# Patient Record
Sex: FEMALE | Race: BLACK OR AFRICAN AMERICAN | Employment: OTHER | URBAN - METROPOLITAN AREA
[De-identification: names, ages, dates, MRNs, and addresses within clinical notes are randomized per-mention and may not be internally consistent; named-entity substitution may affect disease eponyms.]

---

## 2017-01-17 ENCOUNTER — TELEPHONE (OUTPATIENT)
Dept: INTERNAL MEDICINE CLINIC | Age: 72
End: 2017-01-17

## 2017-01-19 ENCOUNTER — OFFICE VISIT (OUTPATIENT)
Dept: INTERNAL MEDICINE CLINIC | Age: 72
End: 2017-01-19

## 2017-01-19 VITALS
WEIGHT: 221 LBS | DIASTOLIC BLOOD PRESSURE: 78 MMHG | HEART RATE: 72 BPM | BODY MASS INDEX: 37.06 KG/M2 | SYSTOLIC BLOOD PRESSURE: 138 MMHG

## 2017-01-19 DIAGNOSIS — J06.9 UPPER RESPIRATORY TRACT INFECTION, UNSPECIFIED TYPE: ICD-10-CM

## 2017-01-19 DIAGNOSIS — M94.0 COSTOCHONDRITIS: Primary | ICD-10-CM

## 2017-01-19 PROCEDURE — 4040F PNEUMOC VAC/ADMIN/RCVD: CPT | Performed by: NURSE PRACTITIONER

## 2017-01-19 PROCEDURE — 3014F SCREEN MAMMO DOC REV: CPT | Performed by: NURSE PRACTITIONER

## 2017-01-19 PROCEDURE — G8484 FLU IMMUNIZE NO ADMIN: HCPCS | Performed by: NURSE PRACTITIONER

## 2017-01-19 PROCEDURE — 1123F ACP DISCUSS/DSCN MKR DOCD: CPT | Performed by: NURSE PRACTITIONER

## 2017-01-19 PROCEDURE — 1036F TOBACCO NON-USER: CPT | Performed by: NURSE PRACTITIONER

## 2017-01-19 PROCEDURE — G8399 PT W/DXA RESULTS DOCUMENT: HCPCS | Performed by: NURSE PRACTITIONER

## 2017-01-19 PROCEDURE — 99213 OFFICE O/P EST LOW 20 MIN: CPT | Performed by: NURSE PRACTITIONER

## 2017-01-19 PROCEDURE — 3017F COLORECTAL CA SCREEN DOC REV: CPT | Performed by: NURSE PRACTITIONER

## 2017-01-19 PROCEDURE — G8427 DOCREV CUR MEDS BY ELIG CLIN: HCPCS | Performed by: NURSE PRACTITIONER

## 2017-01-19 PROCEDURE — G8419 CALC BMI OUT NRM PARAM NOF/U: HCPCS | Performed by: NURSE PRACTITIONER

## 2017-01-19 PROCEDURE — 1090F PRES/ABSN URINE INCON ASSESS: CPT | Performed by: NURSE PRACTITIONER

## 2017-01-19 RX ORDER — IBUPROFEN 200 MG
600 TABLET ORAL EVERY 8 HOURS PRN
Qty: 120 TABLET | Refills: 0 | COMMUNITY
Start: 2017-01-19 | End: 2017-09-15

## 2017-01-19 RX ORDER — AZITHROMYCIN 250 MG/1
TABLET, FILM COATED ORAL
Qty: 1 PACKET | Refills: 0 | Status: SHIPPED | OUTPATIENT
Start: 2017-01-19 | End: 2017-01-29

## 2017-01-19 RX ORDER — BENZONATATE 100 MG/1
200 CAPSULE ORAL 3 TIMES DAILY PRN
Qty: 30 CAPSULE | Refills: 0 | Status: SHIPPED | OUTPATIENT
Start: 2017-01-19 | End: 2017-01-26

## 2017-02-28 ENCOUNTER — OFFICE VISIT (OUTPATIENT)
Dept: GYNECOLOGY | Age: 72
End: 2017-02-28

## 2017-02-28 VITALS
DIASTOLIC BLOOD PRESSURE: 78 MMHG | WEIGHT: 218.8 LBS | BODY MASS INDEX: 40.27 KG/M2 | TEMPERATURE: 97.3 F | HEART RATE: 68 BPM | RESPIRATION RATE: 17 BRPM | SYSTOLIC BLOOD PRESSURE: 143 MMHG | HEIGHT: 62 IN

## 2017-02-28 DIAGNOSIS — Z01.419 WELL WOMAN EXAM WITH ROUTINE GYNECOLOGICAL EXAM: Primary | ICD-10-CM

## 2017-02-28 DIAGNOSIS — N89.8 VAGINAL DRYNESS: ICD-10-CM

## 2017-02-28 DIAGNOSIS — Z78.0 MENOPAUSE: ICD-10-CM

## 2017-02-28 DIAGNOSIS — Z12.31 ENCOUNTER FOR SCREENING MAMMOGRAM FOR MALIGNANT NEOPLASM OF BREAST: ICD-10-CM

## 2017-02-28 PROCEDURE — G0101 CA SCREEN;PELVIC/BREAST EXAM: HCPCS | Performed by: OBSTETRICS & GYNECOLOGY

## 2017-02-28 ASSESSMENT — ENCOUNTER SYMPTOMS
RESPIRATORY NEGATIVE: 1
EYES NEGATIVE: 1
GASTROINTESTINAL NEGATIVE: 1

## 2017-03-03 LAB
HPV COMMENT: NORMAL
HPV TYPE 16: NOT DETECTED
HPV TYPE 18: NOT DETECTED
HPVOH (OTHER TYPES): NOT DETECTED

## 2017-05-02 ENCOUNTER — OFFICE VISIT (OUTPATIENT)
Dept: INTERNAL MEDICINE CLINIC | Age: 72
End: 2017-05-02

## 2017-05-02 VITALS
WEIGHT: 219 LBS | OXYGEN SATURATION: 98 % | SYSTOLIC BLOOD PRESSURE: 135 MMHG | DIASTOLIC BLOOD PRESSURE: 75 MMHG | BODY MASS INDEX: 40.16 KG/M2 | HEART RATE: 68 BPM

## 2017-05-02 DIAGNOSIS — E78.00 PURE HYPERCHOLESTEROLEMIA: Chronic | ICD-10-CM

## 2017-05-02 DIAGNOSIS — E89.0 POST-SURGICAL HYPOTHYROIDISM: Chronic | ICD-10-CM

## 2017-05-02 DIAGNOSIS — E66.01 SEVERE OBESITY (BMI 35.0-39.9) WITH COMORBIDITY (HCC): ICD-10-CM

## 2017-05-02 DIAGNOSIS — R03.0 BORDERLINE HYPERTENSION: ICD-10-CM

## 2017-05-02 DIAGNOSIS — E78.00 PURE HYPERCHOLESTEROLEMIA: Primary | Chronic | ICD-10-CM

## 2017-05-02 DIAGNOSIS — K21.9 GASTROESOPHAGEAL REFLUX DISEASE, ESOPHAGITIS PRESENCE NOT SPECIFIED: Chronic | ICD-10-CM

## 2017-05-02 DIAGNOSIS — G40.909 SEIZURE DISORDER (HCC): Chronic | ICD-10-CM

## 2017-05-02 DIAGNOSIS — D70.2 OTHER DRUG-INDUCED NEUTROPENIA (HCC): Chronic | ICD-10-CM

## 2017-05-02 LAB
CHOLESTEROL, TOTAL: 217 MG/DL (ref 0–199)
HDLC SERPL-MCNC: 68 MG/DL (ref 40–60)
LDL CHOLESTEROL CALCULATED: 134 MG/DL
TRIGL SERPL-MCNC: 73 MG/DL (ref 0–150)
TSH SERPL DL<=0.05 MIU/L-ACNC: 1.84 UIU/ML (ref 0.27–4.2)
VLDLC SERPL CALC-MCNC: 15 MG/DL

## 2017-05-02 PROCEDURE — 1123F ACP DISCUSS/DSCN MKR DOCD: CPT | Performed by: INTERNAL MEDICINE

## 2017-05-02 PROCEDURE — G0447 BEHAVIOR COUNSEL OBESITY 15M: HCPCS | Performed by: INTERNAL MEDICINE

## 2017-05-02 PROCEDURE — G8417 CALC BMI ABV UP PARAM F/U: HCPCS | Performed by: INTERNAL MEDICINE

## 2017-05-02 PROCEDURE — 1036F TOBACCO NON-USER: CPT | Performed by: INTERNAL MEDICINE

## 2017-05-02 PROCEDURE — 99214 OFFICE O/P EST MOD 30 MIN: CPT | Performed by: INTERNAL MEDICINE

## 2017-05-02 PROCEDURE — G8427 DOCREV CUR MEDS BY ELIG CLIN: HCPCS | Performed by: INTERNAL MEDICINE

## 2017-05-02 PROCEDURE — 1090F PRES/ABSN URINE INCON ASSESS: CPT | Performed by: INTERNAL MEDICINE

## 2017-05-02 PROCEDURE — 3014F SCREEN MAMMO DOC REV: CPT | Performed by: INTERNAL MEDICINE

## 2017-05-02 PROCEDURE — 3017F COLORECTAL CA SCREEN DOC REV: CPT | Performed by: INTERNAL MEDICINE

## 2017-05-02 PROCEDURE — G8399 PT W/DXA RESULTS DOCUMENT: HCPCS | Performed by: INTERNAL MEDICINE

## 2017-05-02 PROCEDURE — 4040F PNEUMOC VAC/ADMIN/RCVD: CPT | Performed by: INTERNAL MEDICINE

## 2017-05-02 RX ORDER — FAMOTIDINE 20 MG/1
20 TABLET, FILM COATED ORAL 2 TIMES DAILY
Qty: 180 TABLET | Refills: 3 | Status: SHIPPED | OUTPATIENT
Start: 2017-05-02 | End: 2018-05-01 | Stop reason: SDUPTHER

## 2017-05-02 RX ORDER — LEVOTHYROXINE SODIUM 88 MCG
TABLET ORAL
Qty: 90 TABLET | Refills: 3 | Status: SHIPPED | OUTPATIENT
Start: 2017-05-02 | End: 2018-04-23 | Stop reason: SDUPTHER

## 2017-05-02 RX ORDER — MOMETASONE FUROATE 50 UG/1
2 SPRAY, METERED NASAL DAILY
Qty: 3 INHALER | Refills: 3 | Status: SHIPPED | OUTPATIENT
Start: 2017-05-02 | End: 2017-11-10 | Stop reason: SDUPTHER

## 2017-05-05 RX ORDER — FLUTICASONE PROPIONATE 50 MCG
1 SPRAY, SUSPENSION (ML) NASAL DAILY
Qty: 1 BOTTLE | Refills: 3 | Status: SHIPPED | OUTPATIENT
Start: 2017-05-05 | End: 2017-09-15 | Stop reason: ALTCHOICE

## 2017-05-30 ENCOUNTER — TELEPHONE (OUTPATIENT)
Dept: INTERNAL MEDICINE CLINIC | Age: 72
End: 2017-05-30

## 2017-09-15 ENCOUNTER — HOSPITAL ENCOUNTER (OUTPATIENT)
Dept: OTHER | Age: 72
Discharge: HOME | End: 2017-09-15
Attending: NURSE PRACTITIONER | Admitting: NURSE PRACTITIONER

## 2017-09-15 ENCOUNTER — OFFICE VISIT (OUTPATIENT)
Dept: INTERNAL MEDICINE CLINIC | Age: 72
End: 2017-09-15

## 2017-09-15 VITALS
SYSTOLIC BLOOD PRESSURE: 130 MMHG | DIASTOLIC BLOOD PRESSURE: 80 MMHG | OXYGEN SATURATION: 99 % | HEART RATE: 67 BPM | WEIGHT: 222 LBS | BODY MASS INDEX: 40.71 KG/M2

## 2017-09-15 DIAGNOSIS — M25.562 ACUTE PAIN OF LEFT KNEE: ICD-10-CM

## 2017-09-15 DIAGNOSIS — M25.531 RIGHT WRIST PAIN: Primary | ICD-10-CM

## 2017-09-15 DIAGNOSIS — M25.531 RIGHT WRIST PAIN: ICD-10-CM

## 2017-09-15 DIAGNOSIS — Z23 FLU VACCINE NEED: ICD-10-CM

## 2017-09-15 PROCEDURE — 4040F PNEUMOC VAC/ADMIN/RCVD: CPT | Performed by: NURSE PRACTITIONER

## 2017-09-15 PROCEDURE — 3014F SCREEN MAMMO DOC REV: CPT | Performed by: NURSE PRACTITIONER

## 2017-09-15 PROCEDURE — 1123F ACP DISCUSS/DSCN MKR DOCD: CPT | Performed by: NURSE PRACTITIONER

## 2017-09-15 PROCEDURE — 1090F PRES/ABSN URINE INCON ASSESS: CPT | Performed by: NURSE PRACTITIONER

## 2017-09-15 PROCEDURE — G8399 PT W/DXA RESULTS DOCUMENT: HCPCS | Performed by: NURSE PRACTITIONER

## 2017-09-15 PROCEDURE — 1036F TOBACCO NON-USER: CPT | Performed by: NURSE PRACTITIONER

## 2017-09-15 PROCEDURE — G0008 ADMIN INFLUENZA VIRUS VAC: HCPCS | Performed by: NURSE PRACTITIONER

## 2017-09-15 PROCEDURE — G8427 DOCREV CUR MEDS BY ELIG CLIN: HCPCS | Performed by: NURSE PRACTITIONER

## 2017-09-15 PROCEDURE — 3017F COLORECTAL CA SCREEN DOC REV: CPT | Performed by: NURSE PRACTITIONER

## 2017-09-15 PROCEDURE — 90662 IIV NO PRSV INCREASED AG IM: CPT | Performed by: NURSE PRACTITIONER

## 2017-09-15 PROCEDURE — 99213 OFFICE O/P EST LOW 20 MIN: CPT | Performed by: NURSE PRACTITIONER

## 2017-09-15 PROCEDURE — G8417 CALC BMI ABV UP PARAM F/U: HCPCS | Performed by: NURSE PRACTITIONER

## 2017-10-03 ENCOUNTER — OFFICE VISIT (OUTPATIENT)
Dept: ORTHOPEDIC SURGERY | Age: 72
End: 2017-10-03

## 2017-10-03 VITALS
HEIGHT: 67 IN | BODY MASS INDEX: 32.96 KG/M2 | SYSTOLIC BLOOD PRESSURE: 140 MMHG | HEART RATE: 77 BPM | WEIGHT: 210 LBS | DIASTOLIC BLOOD PRESSURE: 91 MMHG

## 2017-10-03 DIAGNOSIS — M19.049 CMC ARTHRITIS: Primary | ICD-10-CM

## 2017-10-03 PROCEDURE — 99203 OFFICE O/P NEW LOW 30 MIN: CPT | Performed by: ORTHOPAEDIC SURGERY

## 2017-10-03 PROCEDURE — L3918 METACARP FX ORTHOSIS PRE OTS: HCPCS | Performed by: ORTHOPAEDIC SURGERY

## 2017-10-03 PROCEDURE — G8427 DOCREV CUR MEDS BY ELIG CLIN: HCPCS | Performed by: ORTHOPAEDIC SURGERY

## 2017-10-03 PROCEDURE — 1123F ACP DISCUSS/DSCN MKR DOCD: CPT | Performed by: ORTHOPAEDIC SURGERY

## 2017-10-03 PROCEDURE — 1036F TOBACCO NON-USER: CPT | Performed by: ORTHOPAEDIC SURGERY

## 2017-10-03 PROCEDURE — 3017F COLORECTAL CA SCREEN DOC REV: CPT | Performed by: ORTHOPAEDIC SURGERY

## 2017-10-03 PROCEDURE — 4040F PNEUMOC VAC/ADMIN/RCVD: CPT | Performed by: ORTHOPAEDIC SURGERY

## 2017-10-03 PROCEDURE — G8484 FLU IMMUNIZE NO ADMIN: HCPCS | Performed by: ORTHOPAEDIC SURGERY

## 2017-10-03 PROCEDURE — G8417 CALC BMI ABV UP PARAM F/U: HCPCS | Performed by: ORTHOPAEDIC SURGERY

## 2017-10-03 PROCEDURE — G8399 PT W/DXA RESULTS DOCUMENT: HCPCS | Performed by: ORTHOPAEDIC SURGERY

## 2017-10-03 PROCEDURE — 1090F PRES/ABSN URINE INCON ASSESS: CPT | Performed by: ORTHOPAEDIC SURGERY

## 2017-10-03 PROCEDURE — 3014F SCREEN MAMMO DOC REV: CPT | Performed by: ORTHOPAEDIC SURGERY

## 2017-10-03 NOTE — PROGRESS NOTES
right upper extremity swelling, no obvious deformity or malalignment  No skin lesions including ecchymosis, erythema or open wounds    Palpation:  Focal tenderness to palpation near thenar eminence and base of thumb CMC joint volar and dorsal aspect. No palpable crepitus, nontender throughout remainder of fingers, hand and wrist, and forearm without instability    Range of Motion:  Painless and symmetrical elbow range of motion  Painless and symmetrical wrist range of motion with total arc of motion 130°, painless pronation and supination  Near full composite fist and full extension of all fingers without increased pain  Mild pain with thumb opposition and abduction    Strength:  5 out of 5 active EPL, FPL, thumb abduction with pain at base of thumb with resisted motion  5 out of 5 FDS, FTP, EDC, interossei  5 out of 5 AIN/PIN    Special Tests:  Gross sensation intact to light touch median, ulnar, radial nerves without deficit  Pain with circumduction and axial grind test positive right thumb CMC joint    Skin: There are no additional worrisome rashes, ulcerations or lesions. Gait: normal nonantalgic gait    Circulation: well perfused, capillary refill brisk in all fingers    Additional Comments:     Additional Examinations:  Left Upper Extremity: Examination of the left upper extremity does not show any tenderness, deformity or injury. Range of motion is unremarkable. There is no gross instability. There are no rashes, ulcerations or lesions.   Strength and tone are normal.       Radiology:     X-rays reviewed in office from 9/15/2017:    Images personally reviewed by me, demonstrating degenerative changes at the thumb [facility] joint, mild diffuse osteopenia without acute fracture, dislocation or other osseous acute abnormality    EXAMINATION:   3 VIEWS OF THE RIGHT WRIST       9/15/2017 4:39 pm       COMPARISON:   None.       HISTORY:   ORDERING PHYSICIAN PROVIDED HISTORY: Right wrist pain   TECHNOLOGIST PROVIDED

## 2017-10-24 ENCOUNTER — OFFICE VISIT (OUTPATIENT)
Dept: ORTHOPEDIC SURGERY | Age: 72
End: 2017-10-24

## 2017-10-24 VITALS — HEIGHT: 67 IN | BODY MASS INDEX: 32.98 KG/M2 | WEIGHT: 210.1 LBS

## 2017-10-24 DIAGNOSIS — M19.049 CMC ARTHRITIS: Primary | ICD-10-CM

## 2017-10-24 PROCEDURE — G8417 CALC BMI ABV UP PARAM F/U: HCPCS | Performed by: ORTHOPAEDIC SURGERY

## 2017-10-24 PROCEDURE — 1036F TOBACCO NON-USER: CPT | Performed by: ORTHOPAEDIC SURGERY

## 2017-10-24 PROCEDURE — 1123F ACP DISCUSS/DSCN MKR DOCD: CPT | Performed by: ORTHOPAEDIC SURGERY

## 2017-10-24 PROCEDURE — G8427 DOCREV CUR MEDS BY ELIG CLIN: HCPCS | Performed by: ORTHOPAEDIC SURGERY

## 2017-10-24 PROCEDURE — 3017F COLORECTAL CA SCREEN DOC REV: CPT | Performed by: ORTHOPAEDIC SURGERY

## 2017-10-24 PROCEDURE — 4040F PNEUMOC VAC/ADMIN/RCVD: CPT | Performed by: ORTHOPAEDIC SURGERY

## 2017-10-24 PROCEDURE — 1090F PRES/ABSN URINE INCON ASSESS: CPT | Performed by: ORTHOPAEDIC SURGERY

## 2017-10-24 PROCEDURE — 3014F SCREEN MAMMO DOC REV: CPT | Performed by: ORTHOPAEDIC SURGERY

## 2017-10-24 PROCEDURE — 99212 OFFICE O/P EST SF 10 MIN: CPT | Performed by: ORTHOPAEDIC SURGERY

## 2017-10-24 PROCEDURE — G8399 PT W/DXA RESULTS DOCUMENT: HCPCS | Performed by: ORTHOPAEDIC SURGERY

## 2017-10-24 PROCEDURE — G8484 FLU IMMUNIZE NO ADMIN: HCPCS | Performed by: ORTHOPAEDIC SURGERY

## 2017-10-26 NOTE — PROGRESS NOTES
Assessment: 57-year-old female with history of right hand and thumb pain after fall 9/8/2017  1. Right hand contusion  2. 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 She appears to have recovered from her acute hand contusion and recent exacerbation of pre-existing CMC arthritis. I do not feel that images today are necessary as the patient has had excellent improvement in symptoms with conservative treatment consisting of rest and activity modification as well as a CMC wrap. She will continue to use the wrap as needed for comfort, continue with activity modification instructions of pinching  as discussed. She also understands that if she begins to have any worsening symptoms other consideration for conservative treatment may include injection, therapy as well as the possibility of operative intervention for right thumb CMC arthritis. At this point, I think that follow-up on an as-needed basis is appropriate and she is in agreement, understanding to call with any changes, questions or concerns    No Follow-up on file. History of Present Illness  Geneva Chavez is a 74 y.o. female , right-hand-dominant, retired, presenting with history of right hand and wrist pain. The patient sustained a fall on 9/8/2017 after losing her balance catching herself with her right hand and wrist. She subsequently  present to her primary care physician complaining of radiating pain from her right thumb into her right forearm, images of her forearm and wrist were obtained at the time on 9/15/2017 demonstrating no evidence of fracture, dislocation or acute osseous abnormality. Examination at her last visit consistent with a likely exacerbation of right CMC arthritis. She was provided with the [facility] wrap and pinching  modification with instructions for resting her right hand and wrist.  She returns today now over 6 weeks after her fall.  She reports that her right hand and wrist have been doing much better. She does wear the wrap which provides comfort for her. Her swelling and pain have improved and she denies any other new injuries or new symptoms today    Review of Systems  Pertinent items are noted in HPI  Review of systems reviewed from Patient History Form dated on 10/3/17 and available in the patient's chart under the Media tab. Vital Signs  There were no vitals filed for this visit. Physical Exam  Constitutional:  Patient is well-nourished and demonstrates normal hygiene. Mental Status:  Patient is alert and oriented to person, place and time. Skin:  Intact, no rashes or lesions. Right Hand/right upper extremity Examination  Inspection: No right hand or right upper extremity swelling,     no obvious deformity or malalignment  No skin lesions including ecchymosis, erythema or open wounds     Palpation: Minimal tenderness to palpation near thenar eminence and base of thumb CMC joint volar and dorsal aspect.   No palpable crepitus, nontender throughout remainder of fingers, hand and wrist, and forearm without instability     Range of Motion:  Painless and symmetrical elbow range of motion  Painless and symmetrical wrist range of motion with total arc of motion 130°, painless pronation and supination  Near full composite fist and full extension of all fingers without increased pain  Mild pain with thumb opposition and abduction     Strength:  5 out of 5 active EPL, FPL, thumb abduction with pain at base of thumb with resisted motion  5 out of 5 FDS, FTP, EDC, interossei  5 out of 5 AIN/PIN     Special Tests:  Gross sensation intact to light touch median, ulnar, radial nerves without deficit  Mild pain with circumduction and axial grind test positive right thumb CMC joint    Capillary refill brisk all fingers, symmetric  Gross sensation intact to light touch median/ulnar/radial nerves  Sensation intact to radial/ulnar aspect of fingertip        Radiology:    X-rays obtained and reviewed in office:  Additional images obtained today    Additional Diagnostic Test Findings:    Office Procedures:  No orders of the defined types were placed in this encounter. Myles Ross MD  Orthopaedic Surgeon, [address]    Contact Information:  Whitney Rausch: [phone]  Clinical )    This dictation was performed with a verbal recognition program [facility]) and it was checked for errors. It is possible that there are still dictated errors within this office note. If so, please bring any errors to my attention for an addendum. All efforts were made to ensure that this office note is accurate.

## 2017-11-02 ENCOUNTER — OFFICE VISIT (OUTPATIENT)
Dept: INTERNAL MEDICINE CLINIC | Age: 72
End: 2017-11-02

## 2017-11-02 VITALS
SYSTOLIC BLOOD PRESSURE: 136 MMHG | HEIGHT: 65 IN | HEART RATE: 71 BPM | WEIGHT: 220 LBS | DIASTOLIC BLOOD PRESSURE: 84 MMHG | BODY MASS INDEX: 36.65 KG/M2 | OXYGEN SATURATION: 94 %

## 2017-11-02 DIAGNOSIS — E78.00 PURE HYPERCHOLESTEROLEMIA: Primary | Chronic | ICD-10-CM

## 2017-11-02 DIAGNOSIS — R03.0 BORDERLINE HYPERTENSION: ICD-10-CM

## 2017-11-02 DIAGNOSIS — E89.0 POST-SURGICAL HYPOTHYROIDISM: Chronic | ICD-10-CM

## 2017-11-02 DIAGNOSIS — K21.9 GASTROESOPHAGEAL REFLUX DISEASE, ESOPHAGITIS PRESENCE NOT SPECIFIED: Chronic | ICD-10-CM

## 2017-11-02 DIAGNOSIS — E78.00 PURE HYPERCHOLESTEROLEMIA: Chronic | ICD-10-CM

## 2017-11-02 DIAGNOSIS — E66.01 SEVERE OBESITY (BMI 35.0-39.9) WITH COMORBIDITY (HCC): ICD-10-CM

## 2017-11-02 PROCEDURE — G8417 CALC BMI ABV UP PARAM F/U: HCPCS | Performed by: INTERNAL MEDICINE

## 2017-11-02 PROCEDURE — G8399 PT W/DXA RESULTS DOCUMENT: HCPCS | Performed by: INTERNAL MEDICINE

## 2017-11-02 PROCEDURE — G0447 BEHAVIOR COUNSEL OBESITY 15M: HCPCS | Performed by: INTERNAL MEDICINE

## 2017-11-02 PROCEDURE — 99214 OFFICE O/P EST MOD 30 MIN: CPT | Performed by: INTERNAL MEDICINE

## 2017-11-02 PROCEDURE — 4040F PNEUMOC VAC/ADMIN/RCVD: CPT | Performed by: INTERNAL MEDICINE

## 2017-11-02 PROCEDURE — 3017F COLORECTAL CA SCREEN DOC REV: CPT | Performed by: INTERNAL MEDICINE

## 2017-11-02 PROCEDURE — 1036F TOBACCO NON-USER: CPT | Performed by: INTERNAL MEDICINE

## 2017-11-02 PROCEDURE — 1123F ACP DISCUSS/DSCN MKR DOCD: CPT | Performed by: INTERNAL MEDICINE

## 2017-11-02 PROCEDURE — 1090F PRES/ABSN URINE INCON ASSESS: CPT | Performed by: INTERNAL MEDICINE

## 2017-11-02 PROCEDURE — G8484 FLU IMMUNIZE NO ADMIN: HCPCS | Performed by: INTERNAL MEDICINE

## 2017-11-02 PROCEDURE — 3014F SCREEN MAMMO DOC REV: CPT | Performed by: INTERNAL MEDICINE

## 2017-11-02 PROCEDURE — G8427 DOCREV CUR MEDS BY ELIG CLIN: HCPCS | Performed by: INTERNAL MEDICINE

## 2017-11-02 ASSESSMENT — PATIENT HEALTH QUESTIONNAIRE - PHQ9
2. FEELING DOWN, DEPRESSED OR HOPELESS: 0
SUM OF ALL RESPONSES TO PHQ QUESTIONS 1-9: 0
SUM OF ALL RESPONSES TO PHQ9 QUESTIONS 1 & 2: 0
1. LITTLE INTEREST OR PLEASURE IN DOING THINGS: 0

## 2017-11-02 NOTE — PROGRESS NOTES
1945    Date of Visit:  11/2/2017    Prior to Visit Medications    Medication Sig Taking? Authorizing Provider   Aspirin-Acetaminophen-Caffeine (EXCEDRIN PO) Take by mouth as needed Yes Historical Provider, MD   famotidine (PEPCID) 20 MG tablet Take 1 tablet by mouth 2 times daily Yes Carl Beltran MD   SYNTHROID 88 MCG tablet TAKE 1 TABLET DAILY Yes Carl Beltran MD   triamterene-hydrochlorothiazide (DYAZIDE) 37.5-25 MG per capsule Take 1 capsule by mouth daily as needed (edema) Yes Carl Beltran MD   dexlansoprazole (DEXILANT) 60 MG CPDR capsule Take 1 capsule by mouth nightly take one daily Yes Carl Beltran MD   Cyanocobalamin (VITAMIN B 12 PO) Take 500 mcg by mouth daily. Yes Historical Provider, MD   levetiracetam (KEPPRA) 500 MG tablet Take 500 mg by mouth 2 times daily. Yes Historical Provider, MD   Ascorbic Acid (VITAMIN C PO) Take 500 mg by mouth. Yes Historical Provider, MD   aspirin 81 MG EC tablet Take 81 mg by mouth daily. Yes Historical Provider, MD   Calcium Citrate-Vitamin D (CALCIUM CITRATE + D PO) Take 1 tablet by mouth 2 times daily (with meals). Yes Historical Provider, MD   mometasone (NASONEX) 50 MCG/ACT nasal spray 2 sprays by Nasal route daily  Carl Beltran MD        Vitals:    11/02/17 0939   BP: 136/84   Pulse: 71   SpO2: 94%   Weight: 220 lb (99.8 kg)   Height: 5' 5.25\" (1.657 m)     Body mass index is 36.33 kg/m². Wt Readings from Last 3 Encounters:   11/02/17 220 lb (99.8 kg)   10/24/17 210 lb 1.6 oz (95.3 kg)   10/03/17 210 lb (95.3 kg)     BP Readings from Last 3 Encounters:   11/02/17 136/84   10/03/17 (!) 140/91   09/15/17 130/80      CC:  Patient presents for follow-up of   1. Pure hypercholesterolemia    2. Severe obesity (BMI 35.0-39. 9) with comorbidity ([address]    3. Borderline hypertension    4. Post-surgical hypothyroidism    5.  Gastroesophageal reflux disease, esophagitis presence not specified         HPI  Hyperlipidemia/obesity:  Patient is following a low fat, low cholesterol diet- eating less carbs, smaller portions. She is not exercising regularly due to recent fall. OTC Supplements: none. Has gained about 10 pounds since accident in September- musculoskeletal symptoms now back to baseline, but having a lot of dental issues, requiring multiple root canals. Lab Results   Component Value Date    CHOL 217 (H) 05/02/2017    TRIG 73 05/02/2017    HDL 68 (H) 05/02/2017    LDLCALC 134 (H) 05/02/2017     Lab Results   Component Value Date    ALT 18 11/01/2016    AST 29 11/01/2016        Borderline hypertension:  130-140/low 80s. No CP, SOB, visual changes, dizziness, palpitations, or new or worsening HA. Trying to avoid dietary sodium. No stimulants. Taking diuretic about 1-2x/week for LE edema, which is well-controlled. Hypothyroidism: Recent symptoms: fatigue, difficulty losing weight. She denies palpitations, dry skin, weight loss, hair loss, constipation, diarrhea. Patient is  taking her medication consistently on an empty stomach. No results found for: [facility]  Lab Results   Component Value Date    TSH 1.84 05/02/2017    TSH 0.88 11/01/2016    TSH 1.08 06/14/2016     GERD:  Currently treated with OTC H2 blocker: Pepcid 20 mg bid. Only taking Dexilant occasionally. Patient denies dysphagia, cough, hoarseness, chest pain, unintentional weight loss, N/V, bloating, early satiety or change in bowel habits. Following reflux precautions and not eating after dinner. Review of Systems  As documented in HPI      Physical Exam   Constitutional: She is oriented to person, place, and time. She appears well-developed and well-nourished. No distress. HENT:   Mouth/Throat: Mucous membranes are normal.   Eyes: Conjunctivae are normal.   Neck: Neck supple. Carotid bruit is not present. No thyroid mass and no thyromegaly present. Cardiovascular: Normal rate, regular rhythm, intact distal pulses and normal pulses.   Exam reveals no gallop and no friction rub. Murmur heard. Systolic murmur is present with a grade of 2/6   Pulmonary/Chest: Effort normal and breath sounds normal. No respiratory distress. She has no wheezes. She has no rhonchi. She has no rales. Abdominal: Soft. She exhibits no distension. There is no tenderness. Musculoskeletal: She exhibits no edema. Lymphadenopathy:     She has cervical adenopathy (enlarged, tender LN left submandibular area). Neurological: She is alert and oriented to person, place, and time. Skin: Skin is warm, dry and intact. No rash noted. No erythema. Psychiatric: She has a normal mood and affect.  Her speech is normal and behavior is normal. Judgment and thought content normal. Cognition and memory are normal.

## 2017-11-02 NOTE — PATIENT INSTRUCTIONS
almonds to cooked vegetables. ¨ Try some vegetarian meals using beans and peas. Add garbanzo or kidney beans to salads. Make burritos and tacos with mashed costello beans or black beans. Where can you learn more? Go to https://[email or web address].[email or web address]. org and sign in to your [employer] account. Enter R393 in the [employer] box to learn more about \"DASH Diet: Care Instructions. \"     If you do not have an account, please click on the \"Sign Up Now\" link. Current as of: April 3, 2017  Content Version: 11.3  © 5333-5414 [employer]. Care instructions adapted under license by [facility] ([facility]). If you have questions about a medical condition or this instruction, always ask your healthcare professional. [address] any warranty or liability for your use of this information.

## 2017-11-06 ENCOUNTER — HOSPITAL ENCOUNTER (OUTPATIENT)
Dept: OTHER | Age: 72
Discharge: HOME | End: 2017-11-06
Attending: INTERNAL MEDICINE | Admitting: INTERNAL MEDICINE

## 2017-11-06 LAB
A/G RATIO: 1 (ref 1.1–2.2)
ALBUMIN SERPL-MCNC: 3.9 G/DL (ref 3.4–5)
ALP BLD-CCNC: 85 U/L (ref 40–129)
ALT SERPL-CCNC: 16 U/L (ref 10–40)
ANION GAP SERPL CALCULATED.3IONS-SCNC: 10 MMOL/L (ref 3–16)
AST SERPL-CCNC: 28 U/L (ref 15–37)
BILIRUB SERPL-MCNC: 0.5 MG/DL (ref 0–1)
BUN BLDV-MCNC: 11 MG/DL (ref 7–20)
CALCIUM SERPL-MCNC: 9.4 MG/DL (ref 8.3–10.6)
CHLORIDE BLD-SCNC: 105 MMOL/L (ref 99–110)
CHOLESTEROL, TOTAL: 171 MG/DL (ref 0–199)
CO2: 28 MMOL/L (ref 21–32)
CREAT SERPL-MCNC: 0.6 MG/DL (ref 0.6–1.2)
GFR AFRICAN AMERICAN: >60
GFR NON-AFRICAN AMERICAN: >60
GLOBULIN: 3.9 G/DL
GLUCOSE BLD-MCNC: 86 MG/DL (ref 70–99)
HDLC SERPL-MCNC: 60 MG/DL (ref 40–60)
LDL CHOLESTEROL CALCULATED: 102 MG/DL
POTASSIUM SERPL-SCNC: 4.4 MMOL/L (ref 3.5–5.1)
SODIUM BLD-SCNC: 143 MMOL/L (ref 136–145)
TOTAL PROTEIN: 7.8 G/DL (ref 6.4–8.2)
TRIGL SERPL-MCNC: 46 MG/DL (ref 0–150)
TSH SERPL DL<=0.05 MIU/L-ACNC: 1.35 UIU/ML (ref 0.27–4.2)
VLDLC SERPL CALC-MCNC: 9 MG/DL

## 2017-11-10 RX ORDER — MOMETASONE FUROATE 50 UG/1
2 SPRAY, METERED NASAL DAILY
Qty: 3 INHALER | Refills: 1 | Status: SHIPPED | OUTPATIENT
Start: 2017-11-10 | End: 2020-05-12 | Stop reason: SDUPTHER

## 2017-11-10 RX ORDER — MOMETASONE FUROATE 50 UG/1
2 SPRAY, METERED NASAL DAILY
Qty: 3 INHALER | Refills: 3 | Status: SHIPPED | OUTPATIENT
Start: 2017-11-10 | End: 2017-11-10 | Stop reason: SDUPTHER

## 2017-11-10 NOTE — TELEPHONE ENCOUNTER
Pended, patient aware insurance will not cover solaraze and will pay out of pocket. Pharmacy aware not cover for solaraze.

## 2018-03-27 ENCOUNTER — HOSPITAL ENCOUNTER (OUTPATIENT)
Dept: CT IMAGING | Age: 73
Discharge: HOME | End: 2018-03-27
Attending: INTERNAL MEDICINE | Admitting: INTERNAL MEDICINE

## 2018-03-27 ENCOUNTER — OFFICE VISIT (OUTPATIENT)
Dept: INTERNAL MEDICINE CLINIC | Age: 73
End: 2018-03-27

## 2018-03-27 VITALS
SYSTOLIC BLOOD PRESSURE: 134 MMHG | DIASTOLIC BLOOD PRESSURE: 82 MMHG | BODY MASS INDEX: 36.99 KG/M2 | WEIGHT: 224 LBS | HEART RATE: 80 BPM

## 2018-03-27 DIAGNOSIS — R10.33 PERIUMBILICAL ABDOMINAL PAIN: ICD-10-CM

## 2018-03-27 DIAGNOSIS — M54.50 ACUTE LEFT-SIDED LOW BACK PAIN WITHOUT SCIATICA: ICD-10-CM

## 2018-03-27 DIAGNOSIS — R10.33 PERIUMBILICAL PAIN: ICD-10-CM

## 2018-03-27 LAB
A/G RATIO: 1.1 (ref 1.1–2.2)
ALBUMIN SERPL-MCNC: 4.5 G/DL (ref 3.4–5)
ALP BLD-CCNC: 84 U/L (ref 40–129)
ALT SERPL-CCNC: 16 U/L (ref 10–40)
ANION GAP SERPL CALCULATED.3IONS-SCNC: 11 MMOL/L (ref 3–16)
AST SERPL-CCNC: 40 U/L (ref 15–37)
BILIRUB SERPL-MCNC: 0.6 MG/DL (ref 0–1)
BILIRUBIN, POC: NORMAL
BLOOD URINE, POC: NORMAL
BUN BLDV-MCNC: 14 MG/DL (ref 7–20)
CALCIUM SERPL-MCNC: 9.6 MG/DL (ref 8.3–10.6)
CHLORIDE BLD-SCNC: 102 MMOL/L (ref 99–110)
CLARITY, POC: CLEAR
CO2: 27 MMOL/L (ref 21–32)
COLOR, POC: CLEAR
CREAT SERPL-MCNC: 0.7 MG/DL (ref 0.6–1.2)
GFR AFRICAN AMERICAN: >60
GFR NON-AFRICAN AMERICAN: >60
GLOBULIN: 4.1 G/DL
GLUCOSE BLD-MCNC: 82 MG/DL (ref 70–99)
GLUCOSE URINE, POC: NORMAL
KETONES, POC: NORMAL
LEUKOCYTE EST, POC: NORMAL
NITRITE, POC: NORMAL
PH, POC: 6
POTASSIUM SERPL-SCNC: 5 MMOL/L (ref 3.5–5.1)
PROTEIN, POC: NORMAL
SODIUM BLD-SCNC: 140 MMOL/L (ref 136–145)
SPECIFIC GRAVITY, POC: 1.02
TOTAL PROTEIN: 8.6 G/DL (ref 6.4–8.2)
UROBILINOGEN, POC: NORMAL

## 2018-03-27 PROCEDURE — 81002 URINALYSIS NONAUTO W/O SCOPE: CPT | Performed by: INTERNAL MEDICINE

## 2018-03-27 PROCEDURE — 3017F COLORECTAL CA SCREEN DOC REV: CPT | Performed by: INTERNAL MEDICINE

## 2018-03-27 PROCEDURE — 1090F PRES/ABSN URINE INCON ASSESS: CPT | Performed by: INTERNAL MEDICINE

## 2018-03-27 PROCEDURE — G8399 PT W/DXA RESULTS DOCUMENT: HCPCS | Performed by: INTERNAL MEDICINE

## 2018-03-27 PROCEDURE — G8427 DOCREV CUR MEDS BY ELIG CLIN: HCPCS | Performed by: INTERNAL MEDICINE

## 2018-03-27 PROCEDURE — 1036F TOBACCO NON-USER: CPT | Performed by: INTERNAL MEDICINE

## 2018-03-27 PROCEDURE — 1123F ACP DISCUSS/DSCN MKR DOCD: CPT | Performed by: INTERNAL MEDICINE

## 2018-03-27 PROCEDURE — 4040F PNEUMOC VAC/ADMIN/RCVD: CPT | Performed by: INTERNAL MEDICINE

## 2018-03-27 PROCEDURE — 99214 OFFICE O/P EST MOD 30 MIN: CPT | Performed by: INTERNAL MEDICINE

## 2018-03-27 PROCEDURE — G8417 CALC BMI ABV UP PARAM F/U: HCPCS | Performed by: INTERNAL MEDICINE

## 2018-03-27 PROCEDURE — G8482 FLU IMMUNIZE ORDER/ADMIN: HCPCS | Performed by: INTERNAL MEDICINE

## 2018-03-27 RX ORDER — METHYLPREDNISOLONE 4 MG/1
TABLET ORAL
Qty: 1 KIT | Refills: 0 | Status: SHIPPED | OUTPATIENT
Start: 2018-03-27 | End: 2018-04-02

## 2018-03-27 NOTE — PROGRESS NOTES
Assessment/Plan     1. Acute left-sided low back pain without sciatica  No overt trigger, but may be related to underlying spinal stenosis. NSAID relatively contraindicated due to severe GERD and muscle relaxant contraindicated due to age. Opioids may lower her seizure threshold, so will use as a last resort. She will follow up with her spine specialist or schedule with PT if symptoms do not resolve with oral steroids. - methylPREDNISolone (MEDROL DOSEPACK) 4 MG tablet; Take as directed for 6 days. Dispense: 1 kit; Refill: 0  - [facility]  - Comprehensive Metabolic Panel; Future    2. Periumbilical abdominal pain  May be referred from spine, but given exquisite tenderness on exam, urgent imaging is warranted. Patient will call if symptoms change or worsen. - POCT Urinalysis no Micro  - Comprehensive Metabolic Panel; Future  - CT Chest Abdomen Pelvis W Contrast; Future    Return in about 1 month (around 4/27/2018). Mandeep Jones   YOB: 1945    Date of Visit:  3/27/2018    Prior to Visit Medications    Medication Sig Taking? Authorizing Provider   mometasone (NASONEX) 50 MCG/ACT nasal spray 2 sprays by Nasal route daily Yes Juan Hightower MD   diclofenac (SOLARAZE) 3 % gel Apply topically 2 times daily. Yes Juan Hightower MD   Aspirin-Acetaminophen-Caffeine (EXCEDRIN PO) Take by mouth as needed Yes Historical Provider, MD   famotidine (PEPCID) 20 MG tablet Take 1 tablet by mouth 2 times daily Yes Juan Hightower MD   SYNTHROID 88 MCG tablet TAKE 1 TABLET DAILY Yes Juan Hightower MD   triamterene-hydrochlorothiazide (DYAZIDE) 37.5-25 MG per capsule Take 1 capsule by mouth daily as needed (edema) Yes Juan Hightower MD   dexlansoprazole (DEXILANT) 60 MG CPDR capsule Take 1 capsule by mouth nightly take one daily Yes Juan Hightower MD   Cyanocobalamin (VITAMIN B 12 PO) Take 500 mcg by mouth daily.  Yes Historical Provider, MD   levetiracetam (KEPPRA)

## 2018-04-16 ENCOUNTER — HOSPITAL ENCOUNTER (OUTPATIENT)
Dept: PHYSICAL THERAPY | Age: 73
Discharge: HOME | End: 2018-04-30
Admitting: INTERNAL MEDICINE

## 2018-04-16 ASSESSMENT — PAIN DESCRIPTION - DIRECTION: RADIATING_TOWARDS: NO

## 2018-04-16 ASSESSMENT — PAIN DESCRIPTION - FREQUENCY: FREQUENCY: INTERMITTENT

## 2018-04-16 ASSESSMENT — PAIN DESCRIPTION - DESCRIPTORS: DESCRIPTORS: ACHING

## 2018-04-16 ASSESSMENT — PAIN DESCRIPTION - PROGRESSION: CLINICAL_PROGRESSION: GRADUALLY IMPROVING

## 2018-04-16 ASSESSMENT — PAIN DESCRIPTION - LOCATION: LOCATION: BACK

## 2018-04-16 ASSESSMENT — PAIN DESCRIPTION - PAIN TYPE: TYPE: ACUTE PAIN

## 2018-04-16 ASSESSMENT — PAIN SCALES - GENERAL: PAINLEVEL_OUTOF10: 8

## 2018-04-16 ASSESSMENT — PAIN DESCRIPTION - ORIENTATION: ORIENTATION: LEFT

## 2018-04-23 ENCOUNTER — TELEPHONE (OUTPATIENT)
Dept: INTERNAL MEDICINE CLINIC | Age: 73
End: 2018-04-23

## 2018-04-23 RX ORDER — LEVOTHYROXINE SODIUM 88 MCG
TABLET ORAL
Qty: 90 TABLET | Refills: 0 | Status: SHIPPED | OUTPATIENT
Start: 2018-04-23 | End: 2018-07-12 | Stop reason: SDUPTHER

## 2018-05-01 ENCOUNTER — HOSPITAL ENCOUNTER (OUTPATIENT)
Dept: OTHER | Age: 73
Discharge: HOME | End: 2018-05-31
Attending: INTERNAL MEDICINE | Admitting: INTERNAL MEDICINE

## 2018-05-01 RX ORDER — FAMOTIDINE 20 MG/1
TABLET, FILM COATED ORAL
Qty: 180 TABLET | Refills: 1 | Status: SHIPPED | OUTPATIENT
Start: 2018-05-01 | End: 2018-10-28 | Stop reason: SDUPTHER

## 2018-06-01 ENCOUNTER — HOSPITAL ENCOUNTER (OUTPATIENT)
Dept: OTHER | Age: 73
Discharge: HOME | End: 2018-06-30
Attending: INTERNAL MEDICINE | Admitting: INTERNAL MEDICINE

## 2018-07-12 ENCOUNTER — OFFICE VISIT (OUTPATIENT)
Dept: INTERNAL MEDICINE CLINIC | Age: 73
End: 2018-07-12

## 2018-07-12 VITALS
SYSTOLIC BLOOD PRESSURE: 136 MMHG | DIASTOLIC BLOOD PRESSURE: 84 MMHG | WEIGHT: 221 LBS | HEART RATE: 80 BPM | BODY MASS INDEX: 36.5 KG/M2

## 2018-07-12 DIAGNOSIS — E89.0 POST-SURGICAL HYPOTHYROIDISM: Chronic | ICD-10-CM

## 2018-07-12 DIAGNOSIS — Z78.0 POST-MENOPAUSAL: ICD-10-CM

## 2018-07-12 DIAGNOSIS — Z91.81 AT HIGH RISK FOR FALLS: ICD-10-CM

## 2018-07-12 DIAGNOSIS — I87.2 VENOUS INSUFFICIENCY: Chronic | ICD-10-CM

## 2018-07-12 DIAGNOSIS — G40.909 SEIZURE DISORDER (HCC): Chronic | ICD-10-CM

## 2018-07-12 DIAGNOSIS — L81.9 HYPERPIGMENTATION: ICD-10-CM

## 2018-07-12 DIAGNOSIS — I10 ESSENTIAL HYPERTENSION: Primary | ICD-10-CM

## 2018-07-12 DIAGNOSIS — E66.01 SEVERE OBESITY (BMI 35.0-39.9) WITH COMORBIDITY (HCC): ICD-10-CM

## 2018-07-12 DIAGNOSIS — K21.9 GASTROESOPHAGEAL REFLUX DISEASE, ESOPHAGITIS PRESENCE NOT SPECIFIED: Chronic | ICD-10-CM

## 2018-07-12 PROBLEM — R10.33 PERIUMBILICAL ABDOMINAL PAIN: Status: RESOLVED | Noted: 2018-03-27 | Resolved: 2018-07-12

## 2018-07-12 PROBLEM — M54.50 ACUTE LEFT-SIDED LOW BACK PAIN WITHOUT SCIATICA: Status: RESOLVED | Noted: 2018-03-27 | Resolved: 2018-07-12

## 2018-07-12 PROCEDURE — G8427 DOCREV CUR MEDS BY ELIG CLIN: HCPCS | Performed by: INTERNAL MEDICINE

## 2018-07-12 PROCEDURE — G0447 BEHAVIOR COUNSEL OBESITY 15M: HCPCS | Performed by: INTERNAL MEDICINE

## 2018-07-12 PROCEDURE — G8417 CALC BMI ABV UP PARAM F/U: HCPCS | Performed by: INTERNAL MEDICINE

## 2018-07-12 PROCEDURE — G8399 PT W/DXA RESULTS DOCUMENT: HCPCS | Performed by: INTERNAL MEDICINE

## 2018-07-12 PROCEDURE — 1101F PT FALLS ASSESS-DOCD LE1/YR: CPT | Performed by: INTERNAL MEDICINE

## 2018-07-12 PROCEDURE — 1123F ACP DISCUSS/DSCN MKR DOCD: CPT | Performed by: INTERNAL MEDICINE

## 2018-07-12 PROCEDURE — 4040F PNEUMOC VAC/ADMIN/RCVD: CPT | Performed by: INTERNAL MEDICINE

## 2018-07-12 PROCEDURE — 1090F PRES/ABSN URINE INCON ASSESS: CPT | Performed by: INTERNAL MEDICINE

## 2018-07-12 PROCEDURE — 1036F TOBACCO NON-USER: CPT | Performed by: INTERNAL MEDICINE

## 2018-07-12 PROCEDURE — 99214 OFFICE O/P EST MOD 30 MIN: CPT | Performed by: INTERNAL MEDICINE

## 2018-07-12 PROCEDURE — 3017F COLORECTAL CA SCREEN DOC REV: CPT | Performed by: INTERNAL MEDICINE

## 2018-07-12 RX ORDER — LEVOTHYROXINE SODIUM 88 MCG
TABLET ORAL
Qty: 90 TABLET | Refills: 1 | Status: SHIPPED | OUTPATIENT
Start: 2018-07-12 | End: 2019-01-08 | Stop reason: SDUPTHER

## 2018-07-12 NOTE — PROGRESS NOTES
Assessment/Plan      1. Essential hypertension  Adequately controlled with intermittent diuretic and dietary measures. If additional medication is required in the future, will avoid daily diuretic due to leg cramps. Consider ACE or ARB, since calcium channel blocker would likely cause worsening edema. - Comprehensive Metabolic Panel, Fasting; Future    2. Venous insufficiency  Adequately controlled with intermittent diuretic, compression stockings- continue. 3. Severe obesity (BMI 35.0-39. 9) with comorbidity ([address]  Modest improvement despite Weight Watcher's diet, but needs to measure portions more carefully. Patient was asked about her current diet and exercise habits, and personalized advice was provided regarding recommended lifestyle changes. Patient's comorbid health conditions associated with elevated BMI were discussed, as well as the likely benefits of weight loss. Based upon patient's motivation to change her behavior, the following plan was agreed upon to work toward a weight loss goal of 35-40 pounds: exercise for at least 30 minutes 4-5 days per week (recumbent bike or aquatic exercise to avoid exacerbating foot issues) and Weight Watcher's diet. Educational materials for  weight loss were provided. Patient will follow-up in 6 month(s) with PCP. Provider spent 15 minutes counseling patient. 4. Post-surgical hypothyroidism  Fatigue and difficulty losing weight are likely multifactorial, but will adjust levothyroxine dose if indicated by lab results.   - TSH without Reflex; Future    5. Gastroesophageal reflux disease, esophagitis presence not specified  Improved on bid Pepcid with occasional PPI for breakthrough. She was encouraged to continue anti-reflux lifestyle changes. She will schedule surveillance EGD since > 10 years since last procedure. 6. Seizure disorder ([address]  Stable on current dose of Keppra- continue per neurology. - DEXA Bone Density Axial Skeleton; Future    7. HPI  Hypertension/edema:  Usually runs 130s/80s, but occasionally drifts into 140s. No CP, SOB, visual changes, dizziness, palpitations, or new or worsening HA. Trying to avoid dietary sodium. No NSAIDs or stimulants. Taking diuretic about 3x/week for LE edema, which is well-controlled. Has leg cramps if takes diuretic any more often. Can only tolerate compression stockings in the winter- also wears during travel. Obesity:  Has been following Weight Watcher's diet, but has only lost 3 pounds over the last 3 months- not measuring portions. Not able to exercise much due to plantar fasciitis- has follow up with podiatry next week. Hypothyroidism: Recent symptoms: fatigue, difficulty losing weight. She denies palpitations, dry skin, weight loss, hair loss, constipation, diarrhea. Patient is taking her medication consistently on an empty stomach. No results found for: [facility]  Lab Results   Component Value Date    TSH 1.35 11/06/2017    TSH 1.84 05/02/2017    TSH 0.88 11/01/2016     GERD:  Currently treated with OTC H2 blocker: Pepcid 20 mg bid. Only taking Dexilant occasionally. Patient denies dysphagia, cough, hoarseness, chest pain, unintentional weight loss, N/V, bloating, early satiety or change in bowel habits. Following reflux precautions and not eating after dinner. Due for surveillance EGD. Seizure Disorder:  Stable on Keppra. No adverse effects. Sees neurologist at [facility] yearly. Review of Systems  As documented in HPI    6 week history of asymptomatic hyperpigmented areas on right inner thigh. Becoming darker with time, but not larger. No known exposures, no contacts with similar symptoms. No prior history of similar symptoms. Physical Exam   Constitutional: She is oriented to person, place, and time. She appears well-developed and well-nourished. No distress. HENT:   Mouth/Throat: Mucous membranes are normal.   Eyes: Conjunctivae are normal.   Neck: Neck supple.  Carotid

## 2018-07-13 DIAGNOSIS — E87.5 HYPERKALEMIA: Primary | ICD-10-CM

## 2018-07-13 PROBLEM — R74.8 ABNORMAL LIVER ENZYMES: Status: ACTIVE | Noted: 2018-07-13

## 2018-08-10 ENCOUNTER — HOSPITAL ENCOUNTER (OUTPATIENT)
Dept: OTHER | Age: 73
Discharge: HOME | End: 2018-08-10
Attending: INTERNAL MEDICINE | Admitting: INTERNAL MEDICINE

## 2018-08-10 DIAGNOSIS — E87.5 HYPERKALEMIA: ICD-10-CM

## 2018-08-10 LAB — POTASSIUM SERPL-SCNC: 4.1 MMOL/L (ref 3.5–5.1)

## 2018-08-13 ENCOUNTER — TELEPHONE (OUTPATIENT)
Dept: INTERNAL MEDICINE CLINIC | Age: 73
End: 2018-08-13

## 2018-08-30 ENCOUNTER — TELEPHONE (OUTPATIENT)
Dept: INTERNAL MEDICINE CLINIC | Age: 73
End: 2018-08-30

## 2018-08-31 ENCOUNTER — OFFICE VISIT (OUTPATIENT)
Dept: INTERNAL MEDICINE CLINIC | Age: 73
End: 2018-08-31

## 2018-08-31 VITALS
HEART RATE: 76 BPM | BODY MASS INDEX: 37.65 KG/M2 | TEMPERATURE: 97.7 F | SYSTOLIC BLOOD PRESSURE: 130 MMHG | HEIGHT: 65 IN | DIASTOLIC BLOOD PRESSURE: 78 MMHG | WEIGHT: 226 LBS

## 2018-08-31 DIAGNOSIS — M20.41 HAMMER TOE OF RIGHT FOOT: Primary | ICD-10-CM

## 2018-08-31 DIAGNOSIS — Z78.0 POSTMENOPAUSAL: ICD-10-CM

## 2018-08-31 DIAGNOSIS — R74.8 ABNORMAL LIVER ENZYMES: ICD-10-CM

## 2018-08-31 DIAGNOSIS — D70.2 OTHER DRUG-INDUCED NEUTROPENIA (HCC): Chronic | ICD-10-CM

## 2018-08-31 DIAGNOSIS — G40.909 SEIZURE DISORDER (HCC): Chronic | ICD-10-CM

## 2018-08-31 DIAGNOSIS — E66.01 SEVERE OBESITY (BMI 35.0-39.9) WITH COMORBIDITY (HCC): ICD-10-CM

## 2018-08-31 DIAGNOSIS — I10 ESSENTIAL HYPERTENSION: ICD-10-CM

## 2018-08-31 PROBLEM — E87.5 HYPERKALEMIA: Status: RESOLVED | Noted: 2018-07-13 | Resolved: 2018-08-31

## 2018-08-31 PROCEDURE — 93000 ELECTROCARDIOGRAM COMPLETE: CPT | Performed by: INTERNAL MEDICINE

## 2018-08-31 PROCEDURE — 1090F PRES/ABSN URINE INCON ASSESS: CPT | Performed by: INTERNAL MEDICINE

## 2018-08-31 PROCEDURE — G8417 CALC BMI ABV UP PARAM F/U: HCPCS | Performed by: INTERNAL MEDICINE

## 2018-08-31 PROCEDURE — G8399 PT W/DXA RESULTS DOCUMENT: HCPCS | Performed by: INTERNAL MEDICINE

## 2018-08-31 PROCEDURE — 3017F COLORECTAL CA SCREEN DOC REV: CPT | Performed by: INTERNAL MEDICINE

## 2018-08-31 PROCEDURE — 1036F TOBACCO NON-USER: CPT | Performed by: INTERNAL MEDICINE

## 2018-08-31 PROCEDURE — 99214 OFFICE O/P EST MOD 30 MIN: CPT | Performed by: INTERNAL MEDICINE

## 2018-08-31 PROCEDURE — G8427 DOCREV CUR MEDS BY ELIG CLIN: HCPCS | Performed by: INTERNAL MEDICINE

## 2018-08-31 PROCEDURE — 4040F PNEUMOC VAC/ADMIN/RCVD: CPT | Performed by: INTERNAL MEDICINE

## 2018-08-31 PROCEDURE — 1101F PT FALLS ASSESS-DOCD LE1/YR: CPT | Performed by: INTERNAL MEDICINE

## 2018-08-31 PROCEDURE — 1123F ACP DISCUSS/DSCN MKR DOCD: CPT | Performed by: INTERNAL MEDICINE

## 2018-08-31 NOTE — PROGRESS NOTES
Preoperative Consultation      Ruben Grant  YOB: 1945    Date of Service:  8/31/2018    Vitals:    08/31/18 1444   BP: 130/78   Pulse: 76   Temp: 97.7 °F (36.5 °C)   TempSrc: Oral   Weight: 226 lb (102.5 kg)   Height: 5' 5\" (1.651 m)       Wt Readings from Last 2 Encounters:   08/31/18 226 lb (102.5 kg)   07/12/18 221 lb (100.2 kg)     BP Readings from Last 3 Encounters:   08/31/18 130/78   07/12/18 136/84   03/27/18 134/82        Allergies   Allergen Reactions    Latex Anaphylaxis    Sulfa Antibiotics      \"GAVE ME A TERRIBLE YEAST INFECTION\"     Outpatient Prescriptions Marked as Taking for the 8/31/18 encounter (Office Visit) with Emi Villa MD   Medication Sig Dispense Refill    SYNTHROID 88 MCG tablet TAKE 1 TABLET DAILY 90 tablet 1    famotidine (PEPCID) 20 MG tablet TAKE 1 TABLET TWICE A  tablet 1    mometasone (NASONEX) 50 MCG/ACT nasal spray 2 sprays by Nasal route daily 3 Inhaler 1    Aspirin-Acetaminophen-Caffeine (EXCEDRIN PO) Take by mouth as needed      triamterene-hydrochlorothiazide (DYAZIDE) 37.5-25 MG per capsule Take 1 capsule by mouth daily as needed (edema)      dexlansoprazole (DEXILANT) 60 MG CPDR capsule Take 1 capsule by mouth nightly take one daily 90 capsule 3    Cyanocobalamin (VITAMIN B 12 PO) Take 500 mcg by mouth daily.  levetiracetam (KEPPRA) 500 MG tablet Take 500 mg by mouth 2 times daily.  Ascorbic Acid (VITAMIN C PO) Take 500 mg by mouth.  aspirin 81 MG EC tablet Take 81 mg by mouth daily.  Calcium Citrate-Vitamin D (CALCIUM CITRATE + D PO) Take 1 tablet by mouth 2 times daily (with meals). Chief Complaint   Patient presents with    Toe Pain     pre op DR Aissatou Arvizu sept 5 [phone]     The following comorbid conditions were considered relevant to operative risk, so their status/stability was assessed:  HTN, abnormal liver enzymes, leukopenia, seizure disorder and obesity.      HPI:    This patient presents to the office today for a preoperative consultation at the request of surgeon, Dr. Kavon Blanco, who plans on performing hammertoe repair on September 5 at [place] [id number].  The current problem began 10 years ago, and symptoms have been worsening with time. Had prior surgery for this problem about 2 years ago, which was unsuccessful. HTN:  120-134/70 at home. No CP, SOB, visual changes, dizziness, palpitations or HA. Abnormal liver enzymes:  No N/V, abdominal pain, brown urine. No Tylenol, NSAIDs or ETOH. Leukopenia:  Due to long-term dilantin therapy, which was discontinued about 5 years ago. Has been stable over several years. No frequent or recurrent infections. Seizure disorder:  No episodes on keppra, which has been well-tolerated. Obesity:  No snoring or apneic episodes per spouse. Planned anesthesia: General   Known anesthesia problems: None   Bleeding risk: No recent or remote history of abnormal bleeding  Personal or FH of DVT/PE: No   Recent steroid use: no  Exercise tolerance: Good   Patient objection to receiving blood products: No    Patient Active Problem List   Diagnosis    Elevated serum alkaline phosphatase level    Hyperlipidemia    Colorectal polyps    Migraine headache    Seizure disorder (HCC)    Venous insufficiency    Post-surgical hypothyroidism    Allergic rhinitis    Leukopenia    Cervical spondylosis    Spinal stenosis, lumbar    Shoulder pain, left    GERD (gastroesophageal reflux disease)    Paresthesias    Trigger ring finger    Hammer toe of right foot    Cataracts, bilateral    Severe obesity (BMI 35.0-39. 9) with comorbidity ([address]    Essential hypertension    CMC arthritis    Hyperpigmentation    Hyperkalemia    Abnormal liver enzymes       Past Medical History:   Diagnosis Date    Allergic rhinitis     Bell's palsy 1/05    Left    Cervical spondylosis     C5-7    Closed fracture of fifth metatarsal bone 3/11 Smoker    Smokeless tobacco: Never Used    Alcohol use No    Drug use: No    Sexual activity: Yes     Partners: Male     Other Topics Concern    Not on file     Social History Narrative    No narrative on file       Review of Systems   As documented in HPI     Physical Exam   Constitutional: She is oriented to person, place, and time. She appears well-developed and well-nourished. No distress. HENT:   Head: Normocephalic and atraumatic. Mouth/Throat: Uvula is midline, oropharynx is clear and moist and mucous membranes are normal.   Eyes: Pupils are equal, round, and reactive to light. Conjunctivae and EOM are normal.   Neck: Trachea normal and normal range of motion. Neck supple. No JVD present. Carotid bruit is not present. No thyroid mass and no thyromegaly present. Cardiovascular: Normal rate, regular rhythm, intact distal pulses and normal pulses. Exam reveals no gallop and no friction rub. Murmur heard. Systolic murmur is present with a grade of 2/6   Pulmonary/Chest: Effort normal and breath sounds normal. No respiratory distress. She has no wheezes. She has no rhonchi. She has no rales. Abdominal: Soft. Normal aorta and bowel sounds are normal. She exhibits no distension and no mass. There is no hepatosplenomegaly. There is no tenderness. Musculoskeletal: She exhibits edema (1+ edema bilateral ankles R > L). She exhibits no tenderness. Foot exam deferred to podiatry. Lymphadenopathy:     She has no cervical adenopathy. Neurological: She is alert and oriented to person, place, and time. No cranial nerve deficit. Coordination normal.   Skin: Skin is warm, dry and intact. No rash noted. No erythema. Psychiatric: She has a normal mood and affect.  Her speech is normal and behavior is normal. Judgment and thought content normal. Cognition and memory are normal.        EKG: sinus bradycardia, unchanged from previous tracings- baseline artifact V1 and V2    Lab Results   Component Value Date    LDLCALC 102 (H) 11/06/2017    LDLCALC 134 (H) 05/02/2017    TRIG 46 11/06/2017    HDL 60 11/06/2017     Lab Results   Component Value Date    GLUF 93 07/12/2018    GLUCOSE 82 03/27/2018    LABA1C 5.7 12/02/2013    LABA1C 5.7 10/26/2012    LABA1C 5.6 05/30/2011     Lab Results   Component Value Date     07/12/2018    K 4.1 08/10/2018    BUN 13 07/12/2018    CREATININE 0.7 07/12/2018    LABGLOM >60 07/12/2018    GFRAA >60 07/12/2018    CALCIUM 9.7 07/12/2018     Lab Results   Component Value Date    ALT 22 07/12/2018    AST 43 (H) 07/12/2018     Lab Results   Component Value Date    HGB 13.6 11/01/2016     Lab Results   Component Value Date    TSH 2.61 07/12/2018            Assessment/Plan:     1. Hammer toe of right foot  1. Preoperative workup as follows: ECG, CMP, CBC  2. Change in medication regimen before surgery: Take Keppra, Synthroid, and Pepcid on morning of surgery with sip of water, and hold all other medications until after surgery, Discontinue ASA 7 days before surgery  3. Prophylaxis for cardiac events with perioperative beta-blockers:Not indicated    4. Deep vein thrombosis prophylaxis: regimen to be chosen by surgical team  5. No contraindications to planned surgery    2. Essential hypertension  Well-controlled on daily diuretic- continue, but hold on day of surgery. - EKG 12 Lead  - Comprehensive Metabolic Panel; Future    3. Abnormal liver enzymes  Mild, asymptomatic, likely due to fatty liver. Will obtain RUQ US if worsens. 4. Other drug-induced neutropenia (HCC)  Due to long-term effects of dilantin on bone marrow. Asymptomatic, stable. - CBC Auto Differential; Future    5. Severe obesity (BMI 35.0-39. 9) with comorbidity ([address]  No clinical evidence of sleep apnea, but recommend close monitoring of perioperative O2 sats. 6. Seizure disorder ([address]  Stable on Keppra.   She will take her daily dose on the morning of surgery in case there are delays.  - DEXA Bone Density Axial

## 2018-10-22 ENCOUNTER — HOSPITAL ENCOUNTER (OUTPATIENT)
Dept: GENERAL RADIOLOGY | Age: 73
Discharge: HOME | End: 2018-10-22
Payer: MEDICARE

## 2018-10-22 DIAGNOSIS — Z78.0 POSTMENOPAUSAL: ICD-10-CM

## 2018-10-22 DIAGNOSIS — G40.909 SEIZURE DISORDER (HCC): Chronic | ICD-10-CM

## 2018-10-22 PROCEDURE — 77080 DXA BONE DENSITY AXIAL: CPT

## 2018-10-29 RX ORDER — FAMOTIDINE 20 MG/1
TABLET, FILM COATED ORAL
Qty: 180 TABLET | Refills: 1 | Status: SHIPPED | OUTPATIENT
Start: 2018-10-29 | End: 2019-08-09 | Stop reason: SDUPTHER

## 2019-01-08 RX ORDER — LEVOTHYROXINE SODIUM 88 MCG
TABLET ORAL
Qty: 90 TABLET | Refills: 1 | Status: SHIPPED | OUTPATIENT
Start: 2019-01-08 | End: 2019-07-07 | Stop reason: SDUPTHER

## 2019-01-22 ENCOUNTER — OFFICE VISIT (OUTPATIENT)
Dept: INTERNAL MEDICINE CLINIC | Age: 74
End: 2019-01-22
Payer: MEDICARE

## 2019-01-22 VITALS
DIASTOLIC BLOOD PRESSURE: 78 MMHG | RESPIRATION RATE: 16 BRPM | BODY MASS INDEX: 37.44 KG/M2 | WEIGHT: 225 LBS | SYSTOLIC BLOOD PRESSURE: 138 MMHG | HEART RATE: 60 BPM

## 2019-01-22 DIAGNOSIS — K21.9 GASTROESOPHAGEAL REFLUX DISEASE, ESOPHAGITIS PRESENCE NOT SPECIFIED: Chronic | ICD-10-CM

## 2019-01-22 DIAGNOSIS — E66.01 SEVERE OBESITY (BMI 35.0-39.9) WITH COMORBIDITY (HCC): ICD-10-CM

## 2019-01-22 DIAGNOSIS — I10 ESSENTIAL HYPERTENSION: Primary | ICD-10-CM

## 2019-01-22 DIAGNOSIS — I87.2 VENOUS INSUFFICIENCY: Chronic | ICD-10-CM

## 2019-01-22 DIAGNOSIS — G40.909 SEIZURE DISORDER (HCC): Chronic | ICD-10-CM

## 2019-01-22 DIAGNOSIS — M79.652 LEFT THIGH PAIN: ICD-10-CM

## 2019-01-22 DIAGNOSIS — E89.0 POST-SURGICAL HYPOTHYROIDISM: Chronic | ICD-10-CM

## 2019-01-22 PROCEDURE — 3017F COLORECTAL CA SCREEN DOC REV: CPT | Performed by: INTERNAL MEDICINE

## 2019-01-22 PROCEDURE — G0447 BEHAVIOR COUNSEL OBESITY 15M: HCPCS | Performed by: INTERNAL MEDICINE

## 2019-01-22 PROCEDURE — G8399 PT W/DXA RESULTS DOCUMENT: HCPCS | Performed by: INTERNAL MEDICINE

## 2019-01-22 PROCEDURE — 4040F PNEUMOC VAC/ADMIN/RCVD: CPT | Performed by: INTERNAL MEDICINE

## 2019-01-22 PROCEDURE — 1101F PT FALLS ASSESS-DOCD LE1/YR: CPT | Performed by: INTERNAL MEDICINE

## 2019-01-22 PROCEDURE — G8427 DOCREV CUR MEDS BY ELIG CLIN: HCPCS | Performed by: INTERNAL MEDICINE

## 2019-01-22 PROCEDURE — 1036F TOBACCO NON-USER: CPT | Performed by: INTERNAL MEDICINE

## 2019-01-22 PROCEDURE — 1090F PRES/ABSN URINE INCON ASSESS: CPT | Performed by: INTERNAL MEDICINE

## 2019-01-22 PROCEDURE — 99214 OFFICE O/P EST MOD 30 MIN: CPT | Performed by: INTERNAL MEDICINE

## 2019-01-22 PROCEDURE — 1123F ACP DISCUSS/DSCN MKR DOCD: CPT | Performed by: INTERNAL MEDICINE

## 2019-01-22 PROCEDURE — G8417 CALC BMI ABV UP PARAM F/U: HCPCS | Performed by: INTERNAL MEDICINE

## 2019-01-22 PROCEDURE — G8482 FLU IMMUNIZE ORDER/ADMIN: HCPCS | Performed by: INTERNAL MEDICINE

## 2019-01-22 ASSESSMENT — PATIENT HEALTH QUESTIONNAIRE - PHQ9
SUM OF ALL RESPONSES TO PHQ9 QUESTIONS 1 & 2: 0
2. FEELING DOWN, DEPRESSED OR HOPELESS: 0
SUM OF ALL RESPONSES TO PHQ QUESTIONS 1-9: 0
1. LITTLE INTEREST OR PLEASURE IN DOING THINGS: 0
SUM OF ALL RESPONSES TO PHQ QUESTIONS 1-9: 0

## 2019-02-26 ENCOUNTER — OFFICE VISIT (OUTPATIENT)
Dept: INTERNAL MEDICINE CLINIC | Age: 74
End: 2019-02-26
Payer: MEDICARE

## 2019-02-26 VITALS
WEIGHT: 222 LBS | OXYGEN SATURATION: 98 % | DIASTOLIC BLOOD PRESSURE: 94 MMHG | BODY MASS INDEX: 36.94 KG/M2 | SYSTOLIC BLOOD PRESSURE: 146 MMHG | HEART RATE: 72 BPM | TEMPERATURE: 97.9 F

## 2019-02-26 DIAGNOSIS — J06.9 URI WITH COUGH AND CONGESTION: Primary | ICD-10-CM

## 2019-02-26 DIAGNOSIS — R53.83 FATIGUE, UNSPECIFIED TYPE: ICD-10-CM

## 2019-02-26 PROCEDURE — 4040F PNEUMOC VAC/ADMIN/RCVD: CPT | Performed by: INTERNAL MEDICINE

## 2019-02-26 PROCEDURE — 1101F PT FALLS ASSESS-DOCD LE1/YR: CPT | Performed by: INTERNAL MEDICINE

## 2019-02-26 PROCEDURE — G8399 PT W/DXA RESULTS DOCUMENT: HCPCS | Performed by: INTERNAL MEDICINE

## 2019-02-26 PROCEDURE — G8428 CUR MEDS NOT DOCUMENT: HCPCS | Performed by: INTERNAL MEDICINE

## 2019-02-26 PROCEDURE — G8482 FLU IMMUNIZE ORDER/ADMIN: HCPCS | Performed by: INTERNAL MEDICINE

## 2019-02-26 PROCEDURE — 1123F ACP DISCUSS/DSCN MKR DOCD: CPT | Performed by: INTERNAL MEDICINE

## 2019-02-26 PROCEDURE — 1090F PRES/ABSN URINE INCON ASSESS: CPT | Performed by: INTERNAL MEDICINE

## 2019-02-26 PROCEDURE — 1036F TOBACCO NON-USER: CPT | Performed by: INTERNAL MEDICINE

## 2019-02-26 PROCEDURE — 99212 OFFICE O/P EST SF 10 MIN: CPT | Performed by: INTERNAL MEDICINE

## 2019-02-26 PROCEDURE — G8417 CALC BMI ABV UP PARAM F/U: HCPCS | Performed by: INTERNAL MEDICINE

## 2019-04-12 ENCOUNTER — OFFICE VISIT (OUTPATIENT)
Dept: INTERNAL MEDICINE CLINIC | Age: 74
End: 2019-04-12
Payer: MEDICARE

## 2019-04-12 VITALS
DIASTOLIC BLOOD PRESSURE: 78 MMHG | OXYGEN SATURATION: 98 % | HEART RATE: 81 BPM | TEMPERATURE: 98.4 F | SYSTOLIC BLOOD PRESSURE: 136 MMHG | WEIGHT: 223 LBS | BODY MASS INDEX: 37.11 KG/M2

## 2019-04-12 DIAGNOSIS — R10.9 LEFT FLANK PAIN: Primary | ICD-10-CM

## 2019-04-12 DIAGNOSIS — R03.0 BORDERLINE HYPERTENSION: ICD-10-CM

## 2019-04-12 LAB
BILIRUBIN, POC: NORMAL
BLOOD URINE, POC: NORMAL
CLARITY, POC: CLEAR
COLOR, POC: CLEAR
GLUCOSE URINE, POC: NORMAL
KETONES, POC: NORMAL
LEUKOCYTE EST, POC: NORMAL
NITRITE, POC: NORMAL
PH, POC: 6.5
PROTEIN, POC: NORMAL
SPECIFIC GRAVITY, POC: 1.01
UROBILINOGEN, POC: NORMAL

## 2019-04-12 PROCEDURE — G8417 CALC BMI ABV UP PARAM F/U: HCPCS | Performed by: INTERNAL MEDICINE

## 2019-04-12 PROCEDURE — G8399 PT W/DXA RESULTS DOCUMENT: HCPCS | Performed by: INTERNAL MEDICINE

## 2019-04-12 PROCEDURE — 4040F PNEUMOC VAC/ADMIN/RCVD: CPT | Performed by: INTERNAL MEDICINE

## 2019-04-12 PROCEDURE — G8427 DOCREV CUR MEDS BY ELIG CLIN: HCPCS | Performed by: INTERNAL MEDICINE

## 2019-04-12 PROCEDURE — 1036F TOBACCO NON-USER: CPT | Performed by: INTERNAL MEDICINE

## 2019-04-12 PROCEDURE — 1123F ACP DISCUSS/DSCN MKR DOCD: CPT | Performed by: INTERNAL MEDICINE

## 2019-04-12 PROCEDURE — 1090F PRES/ABSN URINE INCON ASSESS: CPT | Performed by: INTERNAL MEDICINE

## 2019-04-12 PROCEDURE — 99214 OFFICE O/P EST MOD 30 MIN: CPT | Performed by: INTERNAL MEDICINE

## 2019-04-12 PROCEDURE — 81002 URINALYSIS NONAUTO W/O SCOPE: CPT | Performed by: INTERNAL MEDICINE

## 2019-04-12 RX ORDER — METHYLPREDNISOLONE 4 MG/1
TABLET ORAL
Qty: 1 KIT | Refills: 0 | Status: SHIPPED | OUTPATIENT
Start: 2019-04-12 | End: 2019-04-18

## 2019-04-12 NOTE — PATIENT INSTRUCTIONS
The medication list included in this document is our record of what you are currently taking, including any changes that were made at today's visit.  If you find any differences when compared to your medications at home, or have any questions that were not answered at your visit, please contact the office.

## 2019-04-12 NOTE — PROGRESS NOTES
Assessment/Plan     1. Left flank pain  Strongly suspect flare of spinal stenosis related to positioning for recent EGD. No evidence of UTI or kidney stones. NSAIDs contraindicated due to gastric erosions, but may continue current dose of Tylenol. If no significant relief with short course of steroids, she will call her pain specialist, Dr. Binta Calix for further evaluation and treatment. Patient will call if symptoms change or worsen. - methylPREDNISolone (MEDROL, FRANCOISE,) 4 MG tablet; Take as directed for 6 days. Dispense: 1 kit; Refill: 0  - POCT Urinalysis no Micro    2. Borderline hypertension  Likely exacerbated due to pain issues. Lower sodium diet recommended. Will continue to monitor. Follow up 5/2/19, as scheduled          David Giselle   YOB: 1945    Date of Visit:  4/12/2019    Allergies   Allergen Reactions    Latex Anaphylaxis    Sulfa Antibiotics      \"GAVE ME A TERRIBLE YEAST INFECTION\"      Prior to Visit Medications    Medication Sig Taking? Authorizing Provider   SYNTHROID 88 MCG tablet TAKE 1 TABLET DAILY Yes Kylee Vera MD   famotidine (PEPCID) 20 MG tablet TAKE 1 TABLET TWICE A DAY Yes Kylee Vera MD   mometasone (NASONEX) 50 MCG/ACT nasal spray 2 sprays by Nasal route daily Yes Kylee Vera MD   triamterene-hydrochlorothiazide (DYAZIDE) 37.5-25 MG per capsule Take 1 capsule by mouth daily as needed (edema) Yes Kylee Vera MD   dexlansoprazole (DEXILANT) 60 MG CPDR capsule Take 1 capsule by mouth nightly take one daily Yes Kylee Vera MD   Cyanocobalamin (VITAMIN B 12 PO) Take 500 mcg by mouth daily. Yes Historical Provider, MD   levetiracetam (KEPPRA) 500 MG tablet Take 500 mg by mouth 2 times daily. Yes Historical Provider, MD   Ascorbic Acid (VITAMIN C PO) Take 500 mg by mouth. Yes Historical Provider, MD   aspirin 81 MG EC tablet Take 81 mg by mouth daily.  Yes Historical Provider, MD   Calcium Citrate-Vitamin D (CALCIUM CITRATE + D PO) Take 1 tablet by mouth 2 times daily (with meals). Yes Historical Provider, MD   diclofenac (SOLARAZE) 3 % gel Apply topically 2 times daily. Veronica Knutson MD   Aspirin-Acetaminophen-Caffeine (EXCEDRIN PO) Take by mouth as needed  Historical Provider, MD       Vitals:    04/12/19 1457   BP: 136/78   Pulse: 81   Temp: 98.4 °F (36.9 °C)   TempSrc: Oral   SpO2: 98%   Weight: 223 lb (101.2 kg)      Body mass index is 37.11 kg/m². Wt Readings from Last 3 Encounters:   04/12/19 223 lb (101.2 kg)   02/26/19 222 lb (100.7 kg)   01/22/19 225 lb (102.1 kg)     BP Readings from Last 3 Encounters:   04/12/19 136/78   02/26/19 (!) 146/94   01/22/19 138/78       CC:  Left flank pain    HPI  Back Pain:  Patient complains of a 1 week(s) history of left middle back pain. Pain is aching in nature, with radiation to LUQ. Pain is constant, typically moderate in intensity, and is exacerbated by sitting and changing positions. Associated symptoms: none. Denies paresthesias, weakness, dysuria, hematuria, fevers, N/V. Patient reports the following CPR Red Flags: none. Precipitating factors: starting 2 days after endoscopy- had to lay on left side. Patient's history includes spinal stenosis at L3-4, L4-5. Previous treatment: acetaminophen- 1000 mg tid, muscle relaxant- single dose. Diagnostic testing: MRI 11/14. Symptoms show no change over time. Review of Systems  As documented in HPI    Social History     Tobacco Use    Smoking status: Never Smoker    Smokeless tobacco: Never Used   Substance Use Topics    Alcohol use: No        Physical Exam   Constitutional: She is oriented to person, place, and time. She appears well-developed and well-nourished. No distress. Pulmonary/Chest: She exhibits tenderness (anterior). Abdominal: Soft. Bowel sounds are normal. She exhibits no distension. There is tenderness (LUQ). There is no rebound and no guarding. Musculoskeletal: She exhibits no edema.    There is tenderness over the thoracic spine and lumbar spine. Paraspinal muscle spasm/tenderness:  Yes, Left. Neurological: She is alert and oriented to person, place, and time. No sensory or motor deficit is noted. Deep tendon reflexes are 2+ and equal bilaterally. Straight leg raise is positive left. Skin: Skin is warm and dry. No rash noted. No erythema. Psychiatric: She has a normal mood and affect.  Her behavior is normal.       Results for POC orders placed in visit on 04/12/19   POCT Urinalysis no Micro   Result Value Ref Range    Color, UA clear     Clarity, UA clear     Glucose, UA POC neg     Bilirubin, UA neg     Ketones, UA neg     Spec Grav, UA 1.015     Blood, UA POC neg     pH, UA 6.5     Protein, UA POC neg     Urobilinogen, UA neg     Leukocytes, UA neg     Nitrite, UA neg

## 2019-04-16 ENCOUNTER — OFFICE VISIT (OUTPATIENT)
Dept: GYNECOLOGY | Age: 74
End: 2019-04-16
Payer: MEDICARE

## 2019-04-16 VITALS
DIASTOLIC BLOOD PRESSURE: 93 MMHG | SYSTOLIC BLOOD PRESSURE: 159 MMHG | BODY MASS INDEX: 34.53 KG/M2 | RESPIRATION RATE: 17 BRPM | HEIGHT: 67 IN | HEART RATE: 62 BPM | WEIGHT: 220 LBS

## 2019-04-16 DIAGNOSIS — Z01.419 WELL WOMAN EXAM WITH ROUTINE GYNECOLOGICAL EXAM: Primary | ICD-10-CM

## 2019-04-16 PROCEDURE — G0101 CA SCREEN;PELVIC/BREAST EXAM: HCPCS | Performed by: OBSTETRICS & GYNECOLOGY

## 2019-04-16 ASSESSMENT — ENCOUNTER SYMPTOMS
EYES NEGATIVE: 1
RESPIRATORY NEGATIVE: 1
BACK PAIN: 1
GASTROINTESTINAL NEGATIVE: 1

## 2019-04-17 NOTE — PROGRESS NOTES
Subjective:      Patient ID: Liv Reese is a 66 y.o. female. Patient is here for annual. Patient stable in menopause. Review of Systems   Constitutional: Negative. HENT: Negative. Eyes: Negative. Respiratory: Negative. Cardiovascular: Negative. Gastrointestinal: Negative. Genitourinary: Negative. Musculoskeletal: Positive for arthralgias and back pain. Skin: Negative. Neurological: Negative. Psychiatric/Behavioral: Negative.       Date of Birth 1945  Past Medical History:   Diagnosis Date    Allergic rhinitis     Bell's palsy 1/05    Left    Cervical spondylosis     C5-7    Closed fracture of fifth metatarsal bone 3/11    Left- displaced    Colorectal polyps     Essential hypertension     GERD (gastroesophageal reflux disease)      EGD 8/08- mild gastritis    Goiter     Multinodular- s/p thyroidectomy 7/04    Hyperlipidemia     Irregular uterine bleeding     Meningiomas, multiple ([address]     Menorrhagia     Migraine headache     Osteoarthritis     Post-surgical hypothyroidism     Seizure disorder ([address]     Secondary to meningioma    Spinal stenosis, lumbar     L3-4, L4-5: moderately severe    Tricuspid regurgitation     Trigger finger     Right    Venous insufficiency      Past Surgical History:   Procedure Laterality Date    ARTHROPLASTY Left 9/05    4th toe    BLEPHAROPLASTY Bilateral 5/06    lower eyelid    BRAIN MENINGIOMA EXCISION  1992, 2000    CARPAL TUNNEL RELEASE Bilateral 1991, 1993, 2003    CHOLECYSTECTOMY  2002    FINGER TRIGGER RELEASE Right 4/07    FINGER TRIGGER RELEASE Right 8/6/13    FOOT SURGERY      with screws placed     FOOT SURGERY Right 09/05/2018    OSTEOTOMY 1ST METATARSAL WITH BUNIONECTOMY    HAMMER TOE SURGERY Right 5/1/13    HYSTERECTOMY      still with ovaries    HYSTERECTOMY, TOTAL ABDOMINAL  1983    Secondary to DUB    SHOULDER ARTHROSCOPY Left 9/24/13    THYROIDECTOMY  7/04    TOTAL KNEE ARTHROPLASTY Right      OB History    Para Term  AB Living   3 2 2   1 2   SAB TAB Ectopic Molar Multiple Live Births   1         2      # Outcome Date GA Lbr Fahad/2nd Weight Sex Delivery Anes PTL Lv   3 SAB 1964           2 Term 1963    F Vag-Spont  Y LEESA   1 Term     M Vag-Spont  Y LEESA     Social History     Socioeconomic History    Marital status:      Spouse name: Not on file    Number of children: Not on file    Years of education: Not on file    Highest education level: Not on file   Occupational History    Occupation: Homemaker   Social Needs    Financial resource strain: Not on file    Food insecurity:     Worry: Not on file     Inability: Not on file    Transportation needs:     Medical: Not on file     Non-medical: Not on file   Tobacco Use    Smoking status: Never Smoker    Smokeless tobacco: Never Used   Substance and Sexual Activity    Alcohol use: No    Drug use: No    Sexual activity: Yes     Partners: Male   Lifestyle    Physical activity:     Days per week: Not on file     Minutes per session: Not on file    Stress: Not on file   Relationships    Social connections:     Talks on phone: Not on file     Gets together: Not on file     Attends [employer] service: Not on file     Active member of club or organization: Not on file     Attends meetings of clubs or organizations: Not on file     Relationship status: Not on file    Intimate partner violence:     Fear of current or ex partner: Not on file     Emotionally abused: Not on file     Physically abused: Not on file     Forced sexual activity: Not on file   Other Topics Concern    Not on file   Social History Narrative    Not on file     Allergies   Allergen Reactions    Latex Anaphylaxis    Sulfa Antibiotics      \"GAVE ME A TERRIBLE YEAST INFECTION\"     Outpatient Medications Marked as Taking for the 19 encounter (Office Visit) with Juan Melara MD   Medication Sig Dispense Refill    methylPREDNISolone (MEDROL, FRANCOISE,) 4 MG tablet Take as directed for 6 days. 1 kit 0    SYNTHROID 88 MCG tablet TAKE 1 TABLET DAILY 90 tablet 1    famotidine (PEPCID) 20 MG tablet TAKE 1 TABLET TWICE A  tablet 1    mometasone (NASONEX) 50 MCG/ACT nasal spray 2 sprays by Nasal route daily 3 Inhaler 1    Aspirin-Acetaminophen-Caffeine (EXCEDRIN PO) Take by mouth as needed      dexlansoprazole (DEXILANT) 60 MG CPDR capsule Take 1 capsule by mouth nightly take one daily 90 capsule 3    Cyanocobalamin (VITAMIN B 12 PO) Take 500 mcg by mouth daily.  levetiracetam (KEPPRA) 500 MG tablet Take 500 mg by mouth 2 times daily.  Ascorbic Acid (VITAMIN C PO) Take 500 mg by mouth.  aspirin 81 MG EC tablet Take 81 mg by mouth daily.  Calcium Citrate-Vitamin D (CALCIUM CITRATE + D PO) Take 1 tablet by mouth 2 times daily (with meals). Family History   Problem Relation Age of Onset    Asthma Mother     Heart Failure Mother          61    Prostate Cancer Father          70    Diabetes Maternal Grandmother         Type II    Glaucoma Maternal Grandmother     Rheum Arthritis Neg Hx     Osteoarthritis Neg Hx     Breast Cancer Neg Hx     Cancer Neg Hx     High Cholesterol Neg Hx     Hypertension Neg Hx     Migraines Neg Hx     Ovarian Cancer Neg Hx     Rashes/Skin Problems Neg Hx     Seizures Neg Hx     Stroke Neg Hx     Thyroid Disease Neg Hx      BP (!) 159/93 (Site: Left Lower Arm, Position: Sitting, Cuff Size: Large Adult)   Pulse 62   Resp 17   Ht 5' 7\" (1.702 m)   Wt 220 lb (99.8 kg)   BMI 34.46 kg/m²       Objective:   Physical Exam   Constitutional: She is oriented to person, place, and time. She appears well-developed and well-nourished. No distress. HENT:   Head: Normocephalic. Eyes: Pupils are equal, round, and reactive to light. Neck: Normal range of motion. No thyromegaly present.    Cardiovascular: Normal rate, regular rhythm, normal heart sounds and intact distal pulses. Exam reveals no gallop and no friction rub. No murmur heard. Pulmonary/Chest: Effort normal and breath sounds normal. No respiratory distress. She has no wheezes. She has no rales. She exhibits no tenderness. No breast tenderness, discharge or bleeding. Abdominal: Soft. She exhibits no distension and no mass. There is no hepatomegaly. There is no tenderness. There is no rebound and no guarding. No hernia. Hernia confirmed negative in the right inguinal area and confirmed negative in the left inguinal area. Genitourinary: Vagina normal. Rectal exam shows no external hemorrhoid, no internal hemorrhoid, no fissure, no mass, no tenderness, anal tone normal and guaiac negative stool. No breast tenderness, discharge or bleeding. No labial fusion. There is no rash, tenderness, lesion or injury on the right labia. There is no rash, tenderness, lesion or injury on the left labia. Right adnexum displays no mass, no tenderness and no fullness. Left adnexum displays no mass, no tenderness and no fullness. No erythema, tenderness or bleeding in the vagina. No foreign body in the vagina. No signs of injury around the vagina. No vaginal discharge found. Genitourinary Comments: Normal urethral meatus, nl urethra, nl bladder. Musculoskeletal: Normal range of motion. She exhibits no edema or tenderness. Lymphadenopathy:     She has no cervical adenopathy. Right: No inguinal adenopathy present. Left: No inguinal adenopathy present. Neurological: She is alert and oriented to person, place, and time. She has normal reflexes. Skin: Skin is warm and dry. She is not diaphoretic. Psychiatric: She has a normal mood and affect. Her behavior is normal. Thought content normal.       Assessment:      1. Annual  2. menopause      Plan:      1.  Pap, calcium, exercise, mammogram, hemocult negative  2. stable        Linda Fernandez MD

## 2019-05-02 ENCOUNTER — OFFICE VISIT (OUTPATIENT)
Dept: INTERNAL MEDICINE CLINIC | Age: 74
End: 2019-05-02
Payer: MEDICARE

## 2019-05-02 VITALS
WEIGHT: 224 LBS | SYSTOLIC BLOOD PRESSURE: 128 MMHG | HEART RATE: 88 BPM | OXYGEN SATURATION: 98 % | DIASTOLIC BLOOD PRESSURE: 64 MMHG | BODY MASS INDEX: 35.08 KG/M2

## 2019-05-02 DIAGNOSIS — K21.9 GASTROESOPHAGEAL REFLUX DISEASE, ESOPHAGITIS PRESENCE NOT SPECIFIED: Chronic | ICD-10-CM

## 2019-05-02 DIAGNOSIS — I10 ESSENTIAL HYPERTENSION: ICD-10-CM

## 2019-05-02 DIAGNOSIS — I87.2 VENOUS INSUFFICIENCY: Chronic | ICD-10-CM

## 2019-05-02 DIAGNOSIS — J20.9 BRONCHITIS, ACUTE, WITH BRONCHOSPASM: Primary | ICD-10-CM

## 2019-05-02 DIAGNOSIS — E66.01 SEVERE OBESITY (BMI 35.0-39.9) WITH COMORBIDITY (HCC): ICD-10-CM

## 2019-05-02 PROBLEM — R74.8 ABNORMAL LIVER ENZYMES: Status: RESOLVED | Noted: 2018-07-13 | Resolved: 2019-05-02

## 2019-05-02 PROCEDURE — G8417 CALC BMI ABV UP PARAM F/U: HCPCS | Performed by: INTERNAL MEDICINE

## 2019-05-02 PROCEDURE — 99214 OFFICE O/P EST MOD 30 MIN: CPT | Performed by: INTERNAL MEDICINE

## 2019-05-02 PROCEDURE — G8399 PT W/DXA RESULTS DOCUMENT: HCPCS | Performed by: INTERNAL MEDICINE

## 2019-05-02 PROCEDURE — 1036F TOBACCO NON-USER: CPT | Performed by: INTERNAL MEDICINE

## 2019-05-02 PROCEDURE — G8427 DOCREV CUR MEDS BY ELIG CLIN: HCPCS | Performed by: INTERNAL MEDICINE

## 2019-05-02 PROCEDURE — 1090F PRES/ABSN URINE INCON ASSESS: CPT | Performed by: INTERNAL MEDICINE

## 2019-05-02 PROCEDURE — G0447 BEHAVIOR COUNSEL OBESITY 15M: HCPCS | Performed by: INTERNAL MEDICINE

## 2019-05-02 PROCEDURE — 1123F ACP DISCUSS/DSCN MKR DOCD: CPT | Performed by: INTERNAL MEDICINE

## 2019-05-02 PROCEDURE — 4040F PNEUMOC VAC/ADMIN/RCVD: CPT | Performed by: INTERNAL MEDICINE

## 2019-05-02 RX ORDER — BUDESONIDE AND FORMOTEROL FUMARATE DIHYDRATE 160; 4.5 UG/1; UG/1
2 AEROSOL RESPIRATORY (INHALATION) 2 TIMES DAILY
Qty: 1 INHALER | Refills: 0 | COMMUNITY
Start: 2019-05-02 | End: 2019-11-14 | Stop reason: ALTCHOICE

## 2019-05-02 NOTE — PROGRESS NOTES
Assessment/Plan     1. Bronchitis, acute, with bronchospasm  Still having significant residual symptoms despite 60 mg prednisone qd, Zithromax and albuterol MDI. Will add Symbicort 2 puffs bid for the next 1-2 weeks. She will complete courses of prednisone and antibiotic. She will return to ED if symptoms worsen despite these measures. 2. Essential hypertension  Adequately controlled with intermittent diuretic and dietary measures. If additional medication is required in the future, will avoid daily diuretic due to leg cramps. Consider ACE or ARB, since calcium channel blocker would likely cause worsening edema. 3. Venous insufficiency  Adequately controlled with intermittent diuretic, compression stockings- continue. 4. Severe obesity (BMI 35.0-39. 9) with comorbidity ([address]  No improvement despite recent lifestyle changes. Patient was asked about her current diet and exercise habits, and personalized advice was provided regarding recommended lifestyle changes. Patient's comorbid health conditions associated with elevated BMI were discussed, as well as the likely benefits of weight loss. Based upon patient's motivation to change her behavior, the following plan was agreed upon to work toward a weight loss goal of 35-40 pounds: continue walking program.  She was referred to our dietitian for customized diet plan. Patient will follow-up in 6 month(s) with PCP. Provider spent 15 minutes counseling patient. 5. Gastroesophageal reflux disease, esophagitis presence not specified  Doing well on bid Pepcid with occasional PPI for breakthrough. She was encouraged to continue anti-reflux lifestyle changes. She will continue to avoid NSAIDs,     Return in about 1 week (around 5/9/2019). Layne Lala   YOB: 1945    Date of Visit:  5/2/2019    Prior to Visit Medications    Medication Sig Taking?  Authorizing Provider   SYNTHROID 88 MCG tablet TAKE 1 TABLET DAILY Yes Ida Akhtar MD   famotidine (PEPCID) 20 MG tablet TAKE 1 TABLET TWICE A DAY Yes Ida Akhtar MD   mometasone (NASONEX) 50 MCG/ACT nasal spray 2 sprays by Nasal route daily Yes Ida Akhtar MD   Aspirin-Acetaminophen-Caffeine (EXCEDRIN PO) Take by mouth as needed Yes Historical Provider, MD   triamterene-hydrochlorothiazide (DYAZIDE) 37.5-25 MG per capsule Take 1 capsule by mouth daily as needed (edema) Yes Ida Akhtar MD   dexlansoprazole (DEXILANT) 60 MG CPDR capsule Take 1 capsule by mouth nightly take one daily Yes Ida Akhtar MD   Cyanocobalamin (VITAMIN B 12 PO) Take 500 mcg by mouth daily. Yes Historical Provider, MD   levetiracetam (KEPPRA) 500 MG tablet Take 500 mg by mouth 2 times daily. Yes Historical Provider, MD   Ascorbic Acid (VITAMIN C PO) Take 500 mg by mouth. Yes Historical Provider, MD   aspirin 81 MG EC tablet Take 81 mg by mouth daily. Yes Historical Provider, MD   Calcium Citrate-Vitamin D (CALCIUM CITRATE + D PO) Take 1 tablet by mouth 2 times daily (with meals). Yes Historical Provider, MD        Vitals:    05/02/19 1038   BP: 128/64   Pulse: 88   SpO2: 98%   Weight: 224 lb (101.6 kg)     Body mass index is 35.08 kg/m². Wt Readings from Last 3 Encounters:   05/02/19 224 lb (101.6 kg)   04/16/19 220 lb (99.8 kg)   04/12/19 223 lb (101.2 kg)     BP Readings from Last 3 Encounters:   05/02/19 128/64   04/16/19 (!) 159/93   04/12/19 136/78      CC:  Patient presents bronchitis and for re-evaluation of the following medical concerns     HPI  Seen at [facility] 4 days ago for SOB, wheezing, cough productive of white sputum. CXR negative. Labs unremarkable, including cardiac enzymes. Received 3 nebulizer treatments with temporary relief. Sent home on 60 mg prednisone x5 days, Z-tisha, albuterol MDI. About 80% improvement since then, but still needing inhaler 4x/day. No history of tobacco exposure or asthma. No adverse effects with medications.     Hypertension/edema: Usually runs 130s/70-80s. No CP, visual changes, dizziness, palpitations, or new or worsening HA. Trying to avoid dietary sodium. No NSAIDs or stimulants. Taking diuretic about 2-3x/week for LE edema, which is well-controlled. Has leg cramps if takes diuretic any more often. Can only tolerate compression stockings in the winter- also wears during travel. Obesity:  Has significantly cut carb intake and eating more fish, chicken and produce. Walking 1 1/2 miles/day. No significant weight loss, so very frustrated. GERD:  Currently treated with OTC H2 blocker: Pepcid 20 mg bid. Only taking Dexilant very occasionally. Patient denies dysphagia, hoarseness, chest pain, unintentional weight loss, N/V, bloating, early satiety or change in bowel habits. Following reflux precautions and not eating after dinner. Last EGD 4/2/19- few gastric erosions, path negative. Review of Systems  As documented in HPI      Physical Exam   Constitutional: She is oriented to person, place, and time. She appears well-developed and well-nourished. No distress. HENT:   Mouth/Throat: Mucous membranes are normal.   Eyes: Conjunctivae are normal.   Neck: Neck supple. Carotid bruit is not present. No thyroid mass and no thyromegaly present. Cardiovascular: Normal rate, regular rhythm, intact distal pulses and normal pulses. Exam reveals no gallop and no friction rub. Murmur heard. Systolic murmur is present with a grade of 2/6. Pulmonary/Chest: Effort normal. No respiratory distress. She has wheezes. She has rhonchi. She has no rales. Decreased air movement throughout   Abdominal: Soft. She exhibits no distension. There is no tenderness. Musculoskeletal: She exhibits edema (trace bilateral ankles). Lymphadenopathy:     She has cervical adenopathy (shotty LA bilaterally). Neurological: She is alert and oriented to person, place, and time. Skin: Skin is warm, dry and intact.    Psychiatric: She has a normal mood and affect.  Her speech is normal and behavior is normal. Judgment and thought content normal. Cognition and memory are normal.

## 2019-05-02 NOTE — PATIENT INSTRUCTIONS
Patient Education        Bronchitis: Care Instructions  Your Care Instructions    Bronchitis is inflammation of the bronchial tubes, which carry air to the lungs. The tubes swell and produce mucus, or phlegm. The mucus and inflamed bronchial tubes make you cough. You may have trouble breathing. Most cases of bronchitis are caused by viruses like those that cause colds. Antibiotics usually do not help and they may be harmful. Bronchitis usually develops rapidly and lasts about 2 to 3 weeks in otherwise healthy people. Follow-up care is a key part of your treatment and safety. Be sure to make and go to all appointments, and call your doctor if you are having problems. It's also a good idea to know your test results and keep a list of the medicines you take. How can you care for yourself at home? · Take all medicines exactly as prescribed. Call your doctor if you think you are having a problem with your medicine. · Get some extra rest.  · Take an over-the-counter pain medicine, such as acetaminophen (Tylenol), ibuprofen (Advil, Motrin), or naproxen (Aleve) to reduce fever and relieve body aches. Read and follow all instructions on the label. · Do not take two or more pain medicines at the same time unless the doctor told you to. Many pain medicines have acetaminophen, which is Tylenol. Too much acetaminophen (Tylenol) can be harmful. · Take an over-the-counter cough medicine that contains dextromethorphan to help quiet a dry, hacking cough so that you can sleep. Avoid cough medicines that have more than one active ingredient. Read and follow all instructions on the label. · Breathe moist air from a humidifier, hot shower, or sink filled with hot water. The heat and moisture will thin mucus so you can cough it out. · Do not smoke. Smoking can make bronchitis worse. If you need help quitting, talk to your doctor about stop-smoking programs and medicines.  These can increase your chances of quitting for good.  When should you call for help? Call 911 anytime you think you may need emergency care. For example, call if:    · You have severe trouble breathing.    Call your doctor now or seek immediate medical care if:    · You have new or worse trouble breathing.     · You cough up dark brown or bloody mucus (sputum).     · You have a new or higher fever.     · You have a new rash.    Watch closely for changes in your health, and be sure to contact your doctor if:    · You cough more deeply or more often, especially if you notice more mucus or a change in the color of your mucus.     · You are not getting better as expected. Where can you learn more? Go to [email or web address] org and sign in to your [employer] account. Enter H333 in the app2you box to learn more about \"Bronchitis: Care Instructions. \"     If you do not have an account, please click on the \"Sign Up Now\" link. Current as of: September 5, 2018  Content Version: 11.9  © 8830-9266 [employer]. Care instructions adapted under license by [facility] ([facility]). If you have questions about a medical condition or this instruction, always ask your healthcare professional. [place] [address] any warranty or liability for your use of this information.

## 2019-05-03 ENCOUNTER — OFFICE VISIT (OUTPATIENT)
Dept: INTERNAL MEDICINE CLINIC | Age: 74
End: 2019-05-03

## 2019-05-03 DIAGNOSIS — E66.01 SEVERE OBESITY (BMI 35.0-39.9) WITH COMORBIDITY (HCC): Primary | ICD-10-CM

## 2019-05-03 PROCEDURE — 99999 PR OFFICE/OUTPT VISIT,PROCEDURE ONLY: CPT | Performed by: DIETITIAN, REGISTERED

## 2019-05-03 NOTE — PROGRESS NOTES
Medical Nutrition Therapy    Bucky Hartley  May 3, 2019      Reason for visit wants to lose weight  Patient Care Team:  Yolanda Solano MD as PCP - General  Yolanda Solano MD as PCP - S Attributed Provider  Dread Reinoso MD as Consulting Physician (Gastroenterology)  Ashlie Deluna MD as Consulting Physician (Physical Medicine and Rehab)  Lorena Lopez MD as Consulting Physician (Neurology)  Parisa Strickland MD as Consulting Physician (Orthopedic Surgery)  Huy Alvarez MD as Consulting Physician (Neurosurgery)  Sly Gordon MD as Consulting Physician (Gastroenterology)  Whitney Vann DO as Consulting Physician (Internal Medicine)  Kizzy Alexis MD as Obstetrician (Obstetrics & Gynecology)      ASSESSMENT/PLAN:   NUTRITION DIAGNOSIS    #1 Problem: Overweight/Obesity (NC-3.3)  Related to: Excessive energy intake or physical inactivity  As Evidenced by: BMI more than normative standard for age and sex (BMI=35.08)      NUTRITION INTERVENTION  Nutrition Prescription: Follow Plate Guide      Interventions:  Increase intake of vegetables, Increase intake of whole grains and Increase activity level by increasing intensity on bicycle    Patient Instructions   Behavior Goals:    Physical Activity: try adding short bursts of higher intensity to your bicycle rides; try adding another 5 minutes of riding before a \"celebration meal\"    Beverages: eliminate sugary drinks, and use Diet Lemonade, Gatorade Zero; eat whole orange instead of drinking orange juice.     Follow Plate Guide and aim for vegetables, fruits, and whole grains daily    Watch portion sizes of \"treat\" foods on Sundays               Patient Active Problem List   Diagnosis    Elevated serum alkaline phosphatase level    Hyperlipidemia    Colorectal polyps    Migraine headache    Seizure disorder (HCC)    Venous insufficiency    Post-surgical hypothyroidism    Allergic rhinitis    Leukopenia    Cervical spondylosis    Spinal stenosis, lumbar  Shoulder pain, left    GERD (gastroesophageal reflux disease)    Paresthesias    Trigger ring finger    Hammer toe of right foot    Cataracts, bilateral    Severe obesity (BMI 35.0-39. 9) with comorbidity ([address]    Essential hypertension    CMC arthritis    Hyperpigmentation    Left thigh pain    Left flank pain       Current Outpatient Medications   Medication Sig Dispense Refill    budesonide-formoterol (SYMBICORT) 160-4.5 MCG/ACT AERO Inhale 2 puffs into the lungs 2 times daily 1 Inhaler 0    SYNTHROID 88 MCG tablet TAKE 1 TABLET DAILY 90 tablet 1    famotidine (PEPCID) 20 MG tablet TAKE 1 TABLET TWICE A  tablet 1    mometasone (NASONEX) 50 MCG/ACT nasal spray 2 sprays by Nasal route daily 3 Inhaler 1    Aspirin-Acetaminophen-Caffeine (EXCEDRIN PO) Take by mouth as needed      triamterene-hydrochlorothiazide (DYAZIDE) 37.5-25 MG per capsule Take 1 capsule by mouth daily as needed (edema)      dexlansoprazole (DEXILANT) 60 MG CPDR capsule Take 1 capsule by mouth nightly take one daily 90 capsule 3    Cyanocobalamin (VITAMIN B 12 PO) Take 500 mcg by mouth daily.  levetiracetam (KEPPRA) 500 MG tablet Take 500 mg by mouth 2 times daily.  Ascorbic Acid (VITAMIN C PO) Take 500 mg by mouth.  aspirin 81 MG EC tablet Take 81 mg by mouth daily.  Calcium Citrate-Vitamin D (CALCIUM CITRATE + D PO) Take 1 tablet by mouth 2 times daily (with meals). No current facility-administered medications for this visit.           NUTRITION ASSESSMENT    Biochemical Data, Medical Tests and Procedures:    Lab Results   Component Value Date    LABA1C 5.7 12/02/2013     Lab Results   Component Value Date    .9 12/02/2013       Lab Results   Component Value Date    CHOL 171 11/06/2017    CHOL 217 (H) 05/02/2017    CHOL 185 06/14/2016     Lab Results   Component Value Date    TRIG 46 11/06/2017    TRIG 73 05/02/2017    TRIG 49 06/14/2016     Lab Results   Component Value Date HDL 67 (H) 01/22/2019    HDL 60 11/06/2017    HDL 68 (H) 05/02/2017     Lab Results   Component Value Date    LDLCALC 130 (H) 01/22/2019    LDLCALC 102 (H) 11/06/2017    LDLCALC 134 (H) 05/02/2017     Lab Results   Component Value Date    LABVLDL 11 01/22/2019    LABVLDL 9 11/06/2017    LABVLDL 15 05/02/2017     No results found for: [facility]    Lab Results   Component Value Date    WBC 3.9 (L) 08/31/2018    HGB 12.9 08/31/2018    HCT 40.6 08/31/2018    MCV 91.8 08/31/2018     08/31/2018     Lab Results   Component Value Date    CREATININE 0.7 01/22/2019    BUN 14 01/22/2019     01/22/2019    K 4.3 01/22/2019     01/22/2019    CO2 28 01/22/2019           Anthropometric Measurements:     Wt Readings from Last 3 Encounters:   05/02/19 224 lb (101.6 kg)   04/16/19 220 lb (99.8 kg)   04/12/19 223 lb (101.2 kg)      BMI Readings from Last 3 Encounters:   05/02/19 35.08 kg/m²   04/16/19 34.46 kg/m²   04/12/19 37.11 kg/m²     Patient's stated goal weight: 185 lb (15 years ago)  7% Weight loss goal weight: 208 lb      Physical Activity Assessment:  Physically active: ride stationary bike, walking  Frequency of activity: 5 days/week  Duration of activity: 35-40  Obstacles to activity: breathing problems      Food and Nutrition History:   Usual beverages: Gatorade/orange juice/lemonade/diet soda  Alcohol consumption: none  Frequency of meals away from home: 1/week  Food Availability: No  Usual meals: FOOD RECALL    Up at 7:30 a, takes Thyroid med and then forgets to eat after the required wait time  First meal--Usual time: 8:45a - 11a  Today: yogurt and banana with medicine  Different Day: banana smoothie with yogurt OR oatmeal OR cheerios/banana      Second meal--Usual time: 2:30 - 4  Recent: skipped yesterday after PCP appointment  Different Day: tuna on bread or crackers; OR salad with egg or chicken    Snack  Crackers or granola bar    Third meal--Usual time: 6p  Recent: pork chop/wild rice/salad  Different Day: slice of pizza; or baked fish with salad    Snack  almonds          Follow Up: two weeks    Referring Provider: Lyn Lima MD  Time spent with patient: 45 minutes

## 2019-05-09 ENCOUNTER — OFFICE VISIT (OUTPATIENT)
Dept: INTERNAL MEDICINE CLINIC | Age: 74
End: 2019-05-09
Payer: MEDICARE

## 2019-05-09 VITALS
WEIGHT: 220 LBS | HEART RATE: 88 BPM | DIASTOLIC BLOOD PRESSURE: 68 MMHG | OXYGEN SATURATION: 96 % | SYSTOLIC BLOOD PRESSURE: 124 MMHG | BODY MASS INDEX: 34.46 KG/M2

## 2019-05-09 DIAGNOSIS — J20.9 BRONCHITIS, ACUTE, WITH BRONCHOSPASM: Primary | ICD-10-CM

## 2019-05-09 PROCEDURE — 1036F TOBACCO NON-USER: CPT | Performed by: INTERNAL MEDICINE

## 2019-05-09 PROCEDURE — 1123F ACP DISCUSS/DSCN MKR DOCD: CPT | Performed by: INTERNAL MEDICINE

## 2019-05-09 PROCEDURE — 1090F PRES/ABSN URINE INCON ASSESS: CPT | Performed by: INTERNAL MEDICINE

## 2019-05-09 PROCEDURE — G8399 PT W/DXA RESULTS DOCUMENT: HCPCS | Performed by: INTERNAL MEDICINE

## 2019-05-09 PROCEDURE — G8417 CALC BMI ABV UP PARAM F/U: HCPCS | Performed by: INTERNAL MEDICINE

## 2019-05-09 PROCEDURE — 4040F PNEUMOC VAC/ADMIN/RCVD: CPT | Performed by: INTERNAL MEDICINE

## 2019-05-09 PROCEDURE — 99213 OFFICE O/P EST LOW 20 MIN: CPT | Performed by: INTERNAL MEDICINE

## 2019-05-09 PROCEDURE — G8427 DOCREV CUR MEDS BY ELIG CLIN: HCPCS | Performed by: INTERNAL MEDICINE

## 2019-05-09 RX ORDER — ALBUTEROL SULFATE 90 UG/1
2 AEROSOL, METERED RESPIRATORY (INHALATION) EVERY 6 HOURS PRN
Qty: 1 INHALER | Refills: 2 | Status: SHIPPED | OUTPATIENT
Start: 2019-05-09 | End: 2019-11-14 | Stop reason: ALTCHOICE

## 2019-05-09 NOTE — PROGRESS NOTES
Assessment/Plan     1. Bronchitis, acute, with bronchospasm  Much improved with addition of Symbicort, which she will continue until pulmonary status returns to baseline. Continue albuterol MDI prn for breakthrough symptoms. She will call if purulent nasal discharge or sputum develops, or if condition otherwise worsens. Return in about 6 months (around 11/9/2019). Layne Lala   YOB: 1945    Date of Visit:  5/9/2019    Allergies   Allergen Reactions    Latex Anaphylaxis    Sulfa Antibiotics      \"GAVE ME A TERRIBLE YEAST INFECTION\"      Prior to Visit Medications    Medication Sig Taking? Authorizing Provider   budesonide-formoterol (SYMBICORT) 160-4.5 MCG/ACT AERO Inhale 2 puffs into the lungs 2 times daily Yes Kerry Seaman MD   SYNTHROID 88 MCG tablet TAKE 1 TABLET DAILY Yes Kerry Seaman MD   famotidine (PEPCID) 20 MG tablet TAKE 1 TABLET TWICE A DAY Yes Kerry Seaman MD   mometasone (NASONEX) 50 MCG/ACT nasal spray 2 sprays by Nasal route daily Yes Kerry Seaman MD   Aspirin-Acetaminophen-Caffeine (EXCEDRIN PO) Take by mouth as needed Yes Historical Provider, MD   triamterene-hydrochlorothiazide (DYAZIDE) 37.5-25 MG per capsule Take 1 capsule by mouth daily as needed (edema) Yes Kerry Seaman MD   dexlansoprazole (DEXILANT) 60 MG CPDR capsule Take 1 capsule by mouth nightly take one daily Yes Kerry Seaman MD   Cyanocobalamin (VITAMIN B 12 PO) Take 500 mcg by mouth daily. Yes Historical Provider, MD   levetiracetam (KEPPRA) 500 MG tablet Take 500 mg by mouth 2 times daily. Yes Historical Provider, MD   Ascorbic Acid (VITAMIN C PO) Take 500 mg by mouth. Yes Historical Provider, MD   aspirin 81 MG EC tablet Take 81 mg by mouth daily. Yes Historical Provider, MD   Calcium Citrate-Vitamin D (CALCIUM CITRATE + D PO) Take 1 tablet by mouth 2 times daily (with meals).  Yes Historical Provider, MD       Vitals:    05/09/19 1053   BP: 124/68   Pulse: 88 SpO2: 96%   Weight: 220 lb (99.8 kg)      Body mass index is 34.46 kg/m². Wt Readings from Last 3 Encounters:   05/09/19 220 lb (99.8 kg)   05/02/19 224 lb (101.6 kg)   04/16/19 220 lb (99.8 kg)     BP Readings from Last 3 Encounters:   05/09/19 124/68   05/02/19 128/64   04/16/19 (!) 159/93       CC:  Patient presents for re-evaluation of the following medical concerns     HPI  Bronchitis:  SOB, wheezing, cough productive of whitish sputum 80% improved with addition of Symbicort. Has completed prednisone and Zithromax. Still having mild sinus pressure, colored nasal discharge in the am only, hoarseness. No documented fevers, no ST. No OTC medications. No side effects with Symbicort and albuterol. Had been using albuterol every 4-6 hours, but backed off yesterday with no ill effects. Lab Results   Component Value Date    LDLCALC 130 (H) 01/22/2019    LDLCALC 102 (H) 11/06/2017    TRIGLYCFAST 55 01/22/2019    TRIG 46 11/06/2017    HDL 67 (H) 01/22/2019     Lab Results   Component Value Date    GLUF 84 01/22/2019    GLUCOSE 86 08/31/2018    LABA1C 5.7 12/02/2013    LABA1C 5.7 10/26/2012    LABA1C 5.6 05/30/2011     Lab Results   Component Value Date     01/22/2019    K 4.3 01/22/2019    BUN 14 01/22/2019    CREATININE 0.7 01/22/2019    LABGLOM >60 01/22/2019    GFRAA >60 01/22/2019    CALCIUM 10.0 01/22/2019     Lab Results   Component Value Date    ALT 15 01/22/2019    AST 24 01/22/2019     Lab Results   Component Value Date    HGB 12.9 08/31/2018     Lab Results   Component Value Date    TSH 1.55 01/22/2019        Review of Systems  As documented in HPI    Social History     Tobacco Use    Smoking status: Never Smoker    Smokeless tobacco: Never Used   Substance Use Topics    Alcohol use: No        Physical Exam   Constitutional: She is oriented to person, place, and time. She appears well-developed and well-nourished. No distress.    HENT:   Mouth/Throat: Mucous membranes are normal.   Eyes: Conjunctivae are normal.   Neck: Neck supple. Cardiovascular: Normal pulses. Pulmonary/Chest: Effort normal. No respiratory distress. She has no wheezes. She has no rhonchi. She has no rales. Air movement much improved   Lymphadenopathy:     She has cervical adenopathy (shotty LA bilaterally). Neurological: She is alert and oriented to person, place, and time. Skin: Skin is warm, dry and intact. Psychiatric: She has a normal mood and affect.  Her speech is normal and behavior is normal. Judgment and thought content normal. Cognition and memory are normal.

## 2019-05-21 ENCOUNTER — OFFICE VISIT (OUTPATIENT)
Dept: INTERNAL MEDICINE CLINIC | Age: 74
End: 2019-05-21

## 2019-05-21 VITALS — WEIGHT: 221.6 LBS | BODY MASS INDEX: 34.71 KG/M2

## 2019-05-21 DIAGNOSIS — E66.01 SEVERE OBESITY (BMI 35.0-39.9) WITH COMORBIDITY (HCC): Primary | ICD-10-CM

## 2019-05-21 PROCEDURE — 99999 PR OFFICE/OUTPT VISIT,PROCEDURE ONLY: CPT | Performed by: DIETITIAN, REGISTERED

## 2019-05-21 NOTE — PROGRESS NOTES
Medical Nutrition Therapy Follow Up  Patient Care Team:  Mary Ellen Harding MD as PCP - Juvenal Yarbrough MD as PCP - S Attributed Provider  Marbella Bartlett MD as Consulting Physician (Gastroenterology)  Kristen Jasso MD as Consulting Physician (Physical Medicine and Rehab)  Greer Julian MD as Consulting Physician (Neurology)  Elida Navarro MD as Consulting Physician (Orthopedic Surgery)  Eli Odom MD as Consulting Physician (Neurosurgery)  Mary Jo Cruz MD as Consulting Physician (Gastroenterology)  Lois Handley DO as Consulting Physician (Internal Medicine)  Ondina Melgar MD as Obstetrician (Obstetrics & Gynecology)    Reason for visit: follow up for weight management  Patient self-assessment of progress: thought I did pretty good, even though I've been ill        ASSESSMENT/PLAN:   NUTRITION DIAGNOSIS    #1 Problem: Overweight/Obesity (NC-3.3)  Related to: Excessive energy intake or physical inactivity  As Evidenced by: BMI more than normative standard for age and sex (BMI=34.71)             NUTRITION INTERVENTION  Nutrition Prescription: Follow Plate Guide to control poritons    NUTRITION MONITORING AND EVALUATION  Indicator/Goal Criteria   #1  Increase intensity or length of bicycle rides  #1 unable to ride due to bronchitis   #2  Eliminate sugary drinks; watch portion sizes #2 4/5   #3  Include vegetables, fruit, whole grains daily #3 4/5     Patient Instructions   BEHAVIOR GOALS       What and how much to eat:    1) Choose lean meats most often   2) Pay attention to hunger and eat only till satisfied      Physical Activity: Get back to walking and bicycle                    Patient Active Problem List   Diagnosis    Elevated serum alkaline phosphatase level    Hyperlipidemia    Colorectal polyps    Migraine headache    Seizure disorder ([facility]    Venous insufficiency    Post-surgical hypothyroidism    Allergic rhinitis    Leukopenia    Cervical spondylosis    Spinal stenosis, lumbar  Shoulder pain, left    GERD (gastroesophageal reflux disease)    Paresthesias    Trigger ring finger    Hammer toe of right foot    Cataracts, bilateral    Severe obesity (BMI 35.0-39. 9) with comorbidity ([address]    Essential hypertension    CMC arthritis    Hyperpigmentation    Left thigh pain    Left flank pain       Current Outpatient Medications   Medication Sig Dispense Refill    albuterol sulfate  (90 Base) MCG/ACT inhaler Inhale 2 puffs into the lungs every 6 hours as needed for Wheezing 1 Inhaler 2    budesonide-formoterol (SYMBICORT) 160-4.5 MCG/ACT AERO Inhale 2 puffs into the lungs 2 times daily 1 Inhaler 0    SYNTHROID 88 MCG tablet TAKE 1 TABLET DAILY 90 tablet 1    famotidine (PEPCID) 20 MG tablet TAKE 1 TABLET TWICE A  tablet 1    mometasone (NASONEX) 50 MCG/ACT nasal spray 2 sprays by Nasal route daily 3 Inhaler 1    Aspirin-Acetaminophen-Caffeine (EXCEDRIN PO) Take by mouth as needed      triamterene-hydrochlorothiazide (DYAZIDE) 37.5-25 MG per capsule Take 1 capsule by mouth daily as needed (edema)      dexlansoprazole (DEXILANT) 60 MG CPDR capsule Take 1 capsule by mouth nightly take one daily 90 capsule 3    Cyanocobalamin (VITAMIN B 12 PO) Take 500 mcg by mouth daily.  levetiracetam (KEPPRA) 500 MG tablet Take 500 mg by mouth 2 times daily.  Ascorbic Acid (VITAMIN C PO) Take 500 mg by mouth.  aspirin 81 MG EC tablet Take 81 mg by mouth daily.  Calcium Citrate-Vitamin D (CALCIUM CITRATE + D PO) Take 1 tablet by mouth 2 times daily (with meals). No current facility-administered medications for this visit.           NUTRITION RE-ASSESSMENT      Anthropometric Measurement Changes:      BMI:   BMI Readings from Last 3 Encounters:   05/09/19 34.46 kg/m²   05/02/19 35.08 kg/m²   04/16/19 34.46 kg/m²     Weight Change:Yes   Wt Readings from Last 3 Encounters:   05/21/19 221 lb 9.6 oz (100.5 kg)   05/09/19 220 lb (99.8 kg)   05/02/19 224 lb

## 2019-05-21 NOTE — PATIENT INSTRUCTIONS
BEHAVIOR GOALS       What and how much to eat:    1) Choose lean meats most often   2) Pay attention to hunger and eat only till satisfied      Physical Activity: Get back to walking and bicycle

## 2019-07-08 RX ORDER — LEVOTHYROXINE SODIUM 88 MCG
TABLET ORAL
Qty: 90 TABLET | Refills: 1 | Status: SHIPPED | OUTPATIENT
Start: 2019-07-08 | End: 2020-01-05

## 2019-08-12 RX ORDER — FAMOTIDINE 20 MG/1
TABLET, FILM COATED ORAL
Qty: 180 TABLET | Refills: 1 | Status: SHIPPED | OUTPATIENT
Start: 2019-08-12 | End: 2020-01-24

## 2019-11-14 ENCOUNTER — OFFICE VISIT (OUTPATIENT)
Dept: INTERNAL MEDICINE CLINIC | Age: 74
End: 2019-11-14
Payer: MEDICARE

## 2019-11-14 VITALS
SYSTOLIC BLOOD PRESSURE: 134 MMHG | DIASTOLIC BLOOD PRESSURE: 68 MMHG | HEART RATE: 74 BPM | BODY MASS INDEX: 35.24 KG/M2 | WEIGHT: 225 LBS | OXYGEN SATURATION: 98 %

## 2019-11-14 DIAGNOSIS — E66.01 SEVERE OBESITY (BMI 35.0-39.9) WITH COMORBIDITY (HCC): ICD-10-CM

## 2019-11-14 DIAGNOSIS — E78.00 PURE HYPERCHOLESTEROLEMIA: Chronic | ICD-10-CM

## 2019-11-14 DIAGNOSIS — I10 ESSENTIAL HYPERTENSION: Primary | ICD-10-CM

## 2019-11-14 DIAGNOSIS — E89.0 POST-SURGICAL HYPOTHYROIDISM: Chronic | ICD-10-CM

## 2019-11-14 DIAGNOSIS — I87.2 VENOUS INSUFFICIENCY: Chronic | ICD-10-CM

## 2019-11-14 DIAGNOSIS — K21.9 GASTROESOPHAGEAL REFLUX DISEASE, ESOPHAGITIS PRESENCE NOT SPECIFIED: Chronic | ICD-10-CM

## 2019-11-14 PROCEDURE — G8427 DOCREV CUR MEDS BY ELIG CLIN: HCPCS | Performed by: INTERNAL MEDICINE

## 2019-11-14 PROCEDURE — 1090F PRES/ABSN URINE INCON ASSESS: CPT | Performed by: INTERNAL MEDICINE

## 2019-11-14 PROCEDURE — 1036F TOBACCO NON-USER: CPT | Performed by: INTERNAL MEDICINE

## 2019-11-14 PROCEDURE — G8399 PT W/DXA RESULTS DOCUMENT: HCPCS | Performed by: INTERNAL MEDICINE

## 2019-11-14 PROCEDURE — 1123F ACP DISCUSS/DSCN MKR DOCD: CPT | Performed by: INTERNAL MEDICINE

## 2019-11-14 PROCEDURE — 99214 OFFICE O/P EST MOD 30 MIN: CPT | Performed by: INTERNAL MEDICINE

## 2019-11-14 PROCEDURE — 4040F PNEUMOC VAC/ADMIN/RCVD: CPT | Performed by: INTERNAL MEDICINE

## 2019-11-14 PROCEDURE — G0447 BEHAVIOR COUNSEL OBESITY 15M: HCPCS | Performed by: INTERNAL MEDICINE

## 2019-11-14 PROCEDURE — G8482 FLU IMMUNIZE ORDER/ADMIN: HCPCS | Performed by: INTERNAL MEDICINE

## 2019-11-14 PROCEDURE — G8417 CALC BMI ABV UP PARAM F/U: HCPCS | Performed by: INTERNAL MEDICINE

## 2020-01-05 RX ORDER — LEVOTHYROXINE SODIUM 88 MCG
TABLET ORAL
Qty: 90 TABLET | Refills: 1 | Status: SHIPPED | OUTPATIENT
Start: 2020-01-05 | End: 2020-05-12 | Stop reason: SDUPTHER

## 2020-01-24 RX ORDER — FAMOTIDINE 20 MG/1
TABLET, FILM COATED ORAL
Qty: 180 TABLET | Refills: 1 | Status: SHIPPED | OUTPATIENT
Start: 2020-01-24 | End: 2021-04-15 | Stop reason: SDUPTHER

## 2020-03-12 PROBLEM — H04.203 BILATERAL EPIPHORA: Status: ACTIVE | Noted: 2020-03-12

## 2020-04-28 ENCOUNTER — TELEPHONE (OUTPATIENT)
Dept: INTERNAL MEDICINE CLINIC | Age: 75
End: 2020-04-28

## 2020-05-01 NOTE — TELEPHONE ENCOUNTER
Would like to know if she can do a pre-op for eye surgery while doing the virtual visit for her back/neck/shoulder.     --------------------------------------------------------------------    Unavailable from 3:30 PM-4:30 PM  Call [phone] after 4:30 PM

## 2020-05-01 NOTE — TELEPHONE ENCOUNTER
Spoke with pt and informed her a pre op can be by Video Visit. Pt understands she can not discuss other c/o shoulder, neck pain. Pt states that issue is much better.    Pt was instructed to download the My Chart caitlin on her phone

## 2020-05-07 ENCOUNTER — TELEPHONE (OUTPATIENT)
Dept: INTERNAL MEDICINE CLINIC | Age: 75
End: 2020-05-07

## 2020-05-07 NOTE — TELEPHONE ENCOUNTER
Left eye watery all time. Will be having surgery to help with that (tear duct). Surgery scheduled for next Thursday. Patient not sure if it is a good idea. Would like Dr. Missy Franklin opinion based on pt's health and everything going on right now.

## 2020-05-11 SDOH — ECONOMIC STABILITY: INCOME INSECURITY: HOW HARD IS IT FOR YOU TO PAY FOR THE VERY BASICS LIKE FOOD, HOUSING, MEDICAL CARE, AND HEATING?: NOT HARD AT ALL

## 2020-05-11 SDOH — ECONOMIC STABILITY: FOOD INSECURITY: WITHIN THE PAST 12 MONTHS, THE FOOD YOU BOUGHT JUST DIDN'T LAST AND YOU DIDN'T HAVE MONEY TO GET MORE.: NEVER TRUE

## 2020-05-11 SDOH — ECONOMIC STABILITY: FOOD INSECURITY: WITHIN THE PAST 12 MONTHS, YOU WORRIED THAT YOUR FOOD WOULD RUN OUT BEFORE YOU GOT MONEY TO BUY MORE.: NEVER TRUE

## 2020-05-11 ASSESSMENT — PATIENT HEALTH QUESTIONNAIRE - PHQ9
SUM OF ALL RESPONSES TO PHQ QUESTIONS 1-9: 0
SUM OF ALL RESPONSES TO PHQ QUESTIONS 1-9: 0

## 2020-05-11 ASSESSMENT — LIFESTYLE VARIABLES: HOW OFTEN DO YOU HAVE A DRINK CONTAINING ALCOHOL: 0

## 2020-05-12 ENCOUNTER — TELEPHONE (OUTPATIENT)
Dept: INTERNAL MEDICINE CLINIC | Age: 75
End: 2020-05-12

## 2020-05-12 ENCOUNTER — TELEMEDICINE (OUTPATIENT)
Dept: INTERNAL MEDICINE CLINIC | Age: 75
End: 2020-05-12
Payer: MEDICARE

## 2020-05-12 VITALS
WEIGHT: 219 LBS | BODY MASS INDEX: 34.3 KG/M2 | DIASTOLIC BLOOD PRESSURE: 90 MMHG | SYSTOLIC BLOOD PRESSURE: 135 MMHG | HEART RATE: 70 BPM | TEMPERATURE: 97.7 F

## 2020-05-12 PROCEDURE — G0438 PPPS, INITIAL VISIT: HCPCS | Performed by: INTERNAL MEDICINE

## 2020-05-12 PROCEDURE — G8427 DOCREV CUR MEDS BY ELIG CLIN: HCPCS | Performed by: INTERNAL MEDICINE

## 2020-05-12 PROCEDURE — G8399 PT W/DXA RESULTS DOCUMENT: HCPCS | Performed by: INTERNAL MEDICINE

## 2020-05-12 PROCEDURE — 4040F PNEUMOC VAC/ADMIN/RCVD: CPT | Performed by: INTERNAL MEDICINE

## 2020-05-12 PROCEDURE — 1090F PRES/ABSN URINE INCON ASSESS: CPT | Performed by: INTERNAL MEDICINE

## 2020-05-12 PROCEDURE — 99213 OFFICE O/P EST LOW 20 MIN: CPT | Performed by: INTERNAL MEDICINE

## 2020-05-12 PROCEDURE — 1123F ACP DISCUSS/DSCN MKR DOCD: CPT | Performed by: INTERNAL MEDICINE

## 2020-05-12 RX ORDER — LOSARTAN POTASSIUM 25 MG/1
25 TABLET ORAL DAILY
Qty: 90 TABLET | Refills: 0 | Status: SHIPPED | OUTPATIENT
Start: 2020-05-12 | End: 2020-08-10

## 2020-05-12 RX ORDER — LEVOTHYROXINE SODIUM 88 MCG
TABLET ORAL
Qty: 90 TABLET | Refills: 1 | Status: SHIPPED | OUTPATIENT
Start: 2020-05-12 | End: 2020-12-28

## 2020-05-12 RX ORDER — MOMETASONE FUROATE 50 UG/1
2 SPRAY, METERED NASAL DAILY
Qty: 3 INHALER | Refills: 1 | Status: SHIPPED | OUTPATIENT
Start: 2020-05-12

## 2020-05-12 NOTE — PATIENT INSTRUCTIONS
Personalized Preventive Plan for Cezar Fenton - 5/12/2020  Medicare offers a range of preventive health benefits. Some of the tests and screenings are paid in full while other may be subject to a deductible, co-insurance, and/or copay. Some of these benefits include a comprehensive review of your medical history including lifestyle, illnesses that may run in your family, and various assessments and screenings as appropriate. After reviewing your medical record and screening and assessments performed today your provider may have ordered immunizations, labs, imaging, and/or referrals for you. A list of these orders (if applicable) as well as your Preventive Care list are included within your After Visit Summary for your review. Other Preventive Recommendations:    · A preventive eye exam performed by an eye specialist is recommended every 1-2 years to screen for glaucoma; cataracts, macular degeneration, and other eye disorders. · A preventive dental visit is recommended every 6 months. · Try to get at least 150 minutes of exercise per week or 10,000 steps per day on a pedometer . · Order or download the FREE \"Exercise & Physical Activity: Your Everyday Guide\" from [employer]. Call 0-315.552.8447 or search [employer] online. · You need 7506-5971 mg of calcium and 9450-1594 IU of vitamin D per day. It is possible to meet your calcium requirement with diet alone, but a vitamin D supplement is usually necessary to meet this goal.  · When exposed to the sun, use a sunscreen that protects against both UVA and UVB radiation with an SPF of 30 or greater. Reapply every 2 to 3 hours or after sweating, drying off with a towel, or swimming. · Always wear a seat belt when traveling in a car. Always wear a helmet when riding a bicycle or motorcycle.

## 2020-05-12 NOTE — PROGRESS NOTES
Medicare Annual Wellness Visit  Name: Kristal Bone Date: 2020   MRN: [id number] Sex: Female   Age: 66 y.o. Ethnicity: Non-/Non    : 1945 Race: Black      Flaca Sellers is here for Medicare AWV (AWV); 6 Month Follow-Up (HTN); and Other (would like to discuss getting pre-op for eye surgery)    Screenings for behavioral, psychosocial and functional/safety risks, and cognitive dysfunction are all negative except as indicated below. These results, as well as other patient data from the [address] form, are documented in Flowsheets linked to this Encounter. Allergies   Allergen Reactions    Latex Anaphylaxis    Sulfa Antibiotics      \"GAVE ME A TERRIBLE YEAST INFECTION\"         Prior to Visit Medications    Medication Sig Taking? Authorizing Provider   famotidine (PEPCID) 20 MG tablet TAKE 1 TABLET TWICE A DAY  Berna Molina MD   SYNTHROID 88 MCG tablet TAKE 1 TABLET DAILY  Berna Molina MD   mometasone (NASONEX) 50 MCG/ACT nasal spray 2 sprays by Nasal route daily  Berna Molina MD   Aspirin-Acetaminophen-Caffeine (EXCEDRIN PO) Take by mouth as needed  Historical Provider, MD   triamterene-hydrochlorothiazide (DYAZIDE) 37.5-25 MG per capsule Take 1 capsule by mouth daily as needed (edema)  Berna Molina MD   dexlansoprazole (DEXILANT) 60 MG CPDR capsule Take 1 capsule by mouth nightly take one daily  Berna Molina MD   Cyanocobalamin (VITAMIN B 12 PO) Take 500 mcg by mouth daily. Historical Provider, MD   levetiracetam (KEPPRA) 500 MG tablet Take 500 mg by mouth 2 times daily. Historical Provider, MD   Ascorbic Acid (VITAMIN C PO) Take 500 mg by mouth. Historical Provider, MD   aspirin 81 MG EC tablet Take 81 mg by mouth daily. Historical Provider, MD   Calcium Citrate-Vitamin D (CALCIUM CITRATE + D PO) Take 1 tablet by mouth 2 times daily (with meals).   Historical Provider, MD         Past Medical History:   Diagnosis Date    Allergic rhinitis     Bell's palsy     Left    Cervical spondylosis     C5-7    Closed fracture of fifth metatarsal bone 3/11    Left- displaced    Colorectal polyps     Essential hypertension     GERD (gastroesophageal reflux disease)      EGD - mild gastritis    Goiter     Multinodular- s/p thyroidectomy     Hyperlipidemia     Irregular uterine bleeding     Meningiomas, multiple ([address]     Menorrhagia     Migraine headache     Osteoarthritis     Post-surgical hypothyroidism     Seizure disorder ([address]     Secondary to meningioma    Spinal stenosis, lumbar     L3-4, L4-5: moderately severe    Tricuspid regurgitation     Trigger finger     Right    Venous insufficiency        Past Surgical History:   Procedure Laterality Date    ARTHROPLASTY Left     4th toe    BLEPHAROPLASTY Bilateral     lower eyelid    BRAIN MENINGIOMA EXCISION  ,     CARPAL TUNNEL RELEASE Bilateral , ,     CHOLECYSTECTOMY  2002    FINGER TRIGGER RELEASE Right     FINGER TRIGGER RELEASE Right 13    FOOT SURGERY      with screws placed     FOOT SURGERY Right 2018    OSTEOTOMY 1ST METATARSAL WITH BUNIONECTOMY    HAMMER TOE SURGERY Right 13    HYSTERECTOMY      still with ovaries    HYSTERECTOMY, TOTAL ABDOMINAL  1983    Secondary to DUB    SHOULDER ARTHROSCOPY Left 13    THYROIDECTOMY      TOTAL KNEE ARTHROPLASTY Right          Family History   Problem Relation Age of Onset    Asthma Mother     Heart Failure Mother          61    Prostate Cancer Father          70    Diabetes Maternal Grandmother         Type II    Glaucoma Maternal Grandmother     Rheum Arthritis Neg Hx     Osteoarthritis Neg Hx     Breast Cancer Neg Hx     Cancer Neg Hx     High Cholesterol Neg Hx     Hypertension Neg Hx     Migraines Neg Hx     Ovarian Cancer Neg Hx     Rashes/Skin Problems Neg Hx     Seizures Neg Hx     Stroke Neg Hx     Thyroid encounter to address concerns as mentioned above. A caregiver was present when appropriate. Due to this being a TeleHealth encounter (During VFMXW-17 public health emergency), evaluation of the following organ systems was limited: Vitals/Constitutional/EENT/Resp/CV/GI//MS/Neuro/Skin/Heme-Lymph-Imm. Pursuant to the emergency declaration under the [address] [place] [address] and the [employer] and [employer] Act, this Virtual Visit was conducted with patient's (and/or legal guardian's) consent, to reduce the patient's risk of exposure to COVID-19 and provide necessary medical care. The patient (and/or legal guardian) has also been advised to contact this office for worsening conditions or problems, and seek emergency medical treatment and/or call 911 if deemed necessary. Patient identification was verified at the start of the visit: Yes    Services were provided through a video synchronous discussion virtually to substitute for in-person clinic visit. Patient and provider were located at their individual homes. --Miguel A Galaviz MD on 5/12/2020 at 9:55 AM    An electronic signature was used to authenticate this note.

## 2020-05-12 NOTE — Clinical Note
Please Fax this letter to Dr. Areli Calderon at [facility]  Fax [phone]  Phone [phone]

## 2020-05-18 ENCOUNTER — TELEPHONE (OUTPATIENT)
Dept: ADMINISTRATIVE | Age: 75
End: 2020-05-18

## 2020-08-10 RX ORDER — LOSARTAN POTASSIUM 25 MG/1
TABLET ORAL
Qty: 90 TABLET | Refills: 1 | Status: SHIPPED | OUTPATIENT
Start: 2020-08-10 | End: 2020-08-20

## 2020-08-19 NOTE — PROGRESS NOTES
Assessment/Plan      1. Essential hypertension  Sub-optimal in the office today. Will increase losartan from 25-50 mg qd. Continue diuretic 2x/week, which is the highest dose tolerated. She will continue to monitor BP at home- call if consistently > 140/90.  - Comprehensive Metabolic Panel, Fasting    2. Venous insufficiency  Adequately controlled with intermittent diuretic, compression stockings- continue. 3. Pure hypercholesterolemia  Importance of lifestyle changes stressed to help prevent need for cholesterol medication in the future. - Lipid, Fasting    4. Post-surgical hypothyroidism  Fatigue and difficulty losing weight are likely multifactorial, but will adjust levothyroxine dose if indicated by lab results.   - TSH without Reflex    5. Gastroesophageal reflux disease, esophagitis presence not specified  Doing well symptomatically on bid Pepcid with occasional PPI for breakthrough. She was encouraged to continue anti-reflux lifestyle changes. If symptoms worsen, she will follow up with GI for repeat EGD since small gastric erosions seen on prior study. 6. Class 2 severe obesity due to excess calories with serious comorbidity and body mass index (BMI) of 35.0 to 35.9 in [facility])  Worse. Patient was asked about her current diet and exercise habits, and personalized advice was provided regarding recommended lifestyle changes. Patient's comorbid health conditions associated with elevated BMI were discussed, as well as the likely benefits of weight loss. Based upon patient's motivation to change her behavior, the following plan was agreed upon to work toward a weight loss goal of 35-40 pounds: continue exercise for at least 30 minutes 4-5 days per week and Weight Watcher's diet to provider accountability. Educational materials for  weight loss were provided. Patient will follow-up in 6 month(s) with PCP. Provider spent 15 minutes counseling patient.       7. Other drug-induced neutropenia [facility])  Due to long-term effects of dilantin on bone marrow. Asymptomatic, stable. - CBC Auto Differential        Return in about 3 months (around 11/20/2020) for 30 MIN F/U- Video corinatim Simpson   YOB: 1945    Date of Visit:  8/20/2020    Prior to Visit Medications    Medication Sig Taking? Authorizing Provider   losartan (COZAAR) 25 MG tablet TAKE ONE TABLET BY MOUTH DAILY Yes Bella Renteria MD   mometasone (NASONEX) 50 MCG/ACT nasal spray 2 sprays by Nasal route daily Yes Bella Renteria MD   SYNTHROID 88 MCG tablet TAKE 1 TABLET DAILY Yes Bella Renteria MD   famotidine (PEPCID) 20 MG tablet TAKE 1 TABLET TWICE A DAY Yes Bella Renteria MD   triamterene-hydrochlorothiazide (DYAZIDE) 37.5-25 MG per capsule Take 1 capsule by mouth three times a week  Yes Bella Renteria MD   Cyanocobalamin (VITAMIN B 12 PO) Take 500 mcg by mouth daily. Yes Historical Provider, MD   levetiracetam (KEPPRA) 500 MG tablet Take 500 mg by mouth 2 times daily. Yes Historical Provider, MD   Ascorbic Acid (VITAMIN C PO) Take 500 mg by mouth daily  Yes Historical Provider, MD   aspirin 81 MG EC tablet Take 81 mg by mouth daily. Yes Historical Provider, MD   Calcium Citrate-Vitamin D (CALCIUM CITRATE + D PO) Take 1 tablet by mouth 2 times daily (with meals). Yes Historical Provider, MD   Aspirin-Acetaminophen-Caffeine (EXCEDRIN PO) Take by mouth as needed  Historical Provider, MD   dexlansoprazole (DEXILANT) 60 MG CPDR capsule Take 1 capsule by mouth nightly take one daily  Patient not taking: Reported on 8/20/2020  Bella Renteria MD        Vitals:    08/20/20 0959 08/20/20 1009 08/20/20 1042   BP: (!) 144/90 (!) 144/90 138/80   Pulse: 60     Temp: 97.8 °F (36.6 °C)     TempSrc: Temporal     SpO2: 99%     Weight: 229 lb 3.2 oz (104 kg)       Body mass index is 35.9 kg/m².      Wt Readings from Last 3 Encounters:   08/20/20 229 lb 3.2 oz (104 kg)   05/12/20 219 lb (99.3 kg)   11/14/19 225 lb (102.1 kg)     BP Readings from Last 3 Encounters:   08/20/20 138/80   05/12/20 (!) 135/90   11/14/19 134/68        CC:  Patient presents for re-evaluation of the following medical concerns     HPI  Hypertension/edema:  Usually runs 130s/70s. No CP, SOB, visual changes, dizziness, palpitations, or new or worsening HA. Trying to avoid dietary sodium. No NSAIDs or stimulants. Taking diuretic about 2x/week for LE edema, which is well-controlled. Has leg cramps if takes diuretic any more often. Can only tolerate compression stockings in the winter- also wears during travel. No side effects since starting losartan about 3 months ago. Obesity/Hyperlipidemia:  Starting lower carb/portion-controlled diet, but many lapses over the past few months. Has met with our dietitian. Riding exercise bicycle 4x/week and walking when weather acceptable. Hypothyroidism: Recent symptoms: fatigue, difficulty losing weight. She denies palpitations, dry skin, weight loss, hair loss, constipation, diarrhea. Patient is taking her medication consistently on an empty stomach. No results found for: [facility]  Lab Results   Component Value Date    TSH 1.64 11/14/2019    TSH 1.55 01/22/2019    TSH 2.61 07/12/2018     GERD:  Currently treated with OTC H2 blocker: Pepcid 20 mg bid. Only taking Dexilant very occasionally. Patient denies dysphagia, cough, hoarseness, chest pain, unintentional weight loss, N/V, bloating, early satiety or change in bowel habits. Following reflux precautions and not eating after dinner. EGD 4/2/19- few gastric erosions, negative path. Review of Systems  As documented in HPI      Physical Exam   Constitutional: She is oriented to person, place, and time. She appears well-developed and well-nourished. No distress. HENT:   Mouth/Throat: Mucous membranes are normal.   Eyes: Conjunctivae are normal.   Neck: Neck supple. Carotid bruit is not present. No thyroid mass and no thyromegaly present. Cardiovascular: Normal rate, regular rhythm, intact distal pulses and normal pulses. Exam reveals no gallop and no friction rub. Murmur heard. Systolic murmur is present with a grade of 2/6. Pulmonary/Chest: Effort normal and breath sounds normal. No respiratory distress. She has no wheezes. She has no rhonchi. She has no rales. Abdominal: Soft. She exhibits no distension. There is no abdominal tenderness. Musculoskeletal:         General: Edema (trace bilateral ankles R > L) present. Lymphadenopathy:     She has cervical adenopathy (Slightly enlarged, NT LN left submandibular area- unchanged). Neurological: She is alert and oriented to person, place, and time. Skin: Skin is warm, dry and intact. Psychiatric: She has a normal mood and affect.  Her speech is normal and behavior is normal. Judgment and thought content normal. Cognition and memory are normal.

## 2020-08-20 ENCOUNTER — OFFICE VISIT (OUTPATIENT)
Dept: INTERNAL MEDICINE CLINIC | Age: 75
End: 2020-08-20
Payer: MEDICARE

## 2020-08-20 VITALS
WEIGHT: 229.2 LBS | OXYGEN SATURATION: 99 % | DIASTOLIC BLOOD PRESSURE: 80 MMHG | TEMPERATURE: 97.8 F | HEART RATE: 60 BPM | BODY MASS INDEX: 35.9 KG/M2 | SYSTOLIC BLOOD PRESSURE: 138 MMHG

## 2020-08-20 LAB
BASOPHILS ABSOLUTE: 0 K/UL (ref 0–0.2)
BASOPHILS RELATIVE PERCENT: 0.6 %
EOSINOPHILS ABSOLUTE: 0.1 K/UL (ref 0–0.6)
EOSINOPHILS RELATIVE PERCENT: 2.1 %
HCT VFR BLD CALC: 40.5 % (ref 36–48)
HEMOGLOBIN: 13 G/DL (ref 12–16)
LYMPHOCYTES ABSOLUTE: 1.5 K/UL (ref 1–5.1)
LYMPHOCYTES RELATIVE PERCENT: 48.2 %
MCH RBC QN AUTO: 29.7 PG (ref 26–34)
MCHC RBC AUTO-ENTMCNC: 32.2 G/DL (ref 31–36)
MCV RBC AUTO: 92 FL (ref 80–100)
MONOCYTES ABSOLUTE: 0.3 K/UL (ref 0–1.3)
MONOCYTES RELATIVE PERCENT: 8.9 %
NEUTROPHILS ABSOLUTE: 1.3 K/UL (ref 1.7–7.7)
NEUTROPHILS RELATIVE PERCENT: 40.2 %
PDW BLD-RTO: 15 % (ref 12.4–15.4)
PLATELET # BLD: 146 K/UL (ref 135–450)
PMV BLD AUTO: 10.2 FL (ref 5–10.5)
RBC # BLD: 4.4 M/UL (ref 4–5.2)
WBC # BLD: 3.2 K/UL (ref 4–11)

## 2020-08-20 PROCEDURE — 4040F PNEUMOC VAC/ADMIN/RCVD: CPT | Performed by: INTERNAL MEDICINE

## 2020-08-20 PROCEDURE — G8417 CALC BMI ABV UP PARAM F/U: HCPCS | Performed by: INTERNAL MEDICINE

## 2020-08-20 PROCEDURE — 1036F TOBACCO NON-USER: CPT | Performed by: INTERNAL MEDICINE

## 2020-08-20 PROCEDURE — 99214 OFFICE O/P EST MOD 30 MIN: CPT | Performed by: INTERNAL MEDICINE

## 2020-08-20 PROCEDURE — 1090F PRES/ABSN URINE INCON ASSESS: CPT | Performed by: INTERNAL MEDICINE

## 2020-08-20 PROCEDURE — G0447 BEHAVIOR COUNSEL OBESITY 15M: HCPCS | Performed by: INTERNAL MEDICINE

## 2020-08-20 PROCEDURE — 36415 COLL VENOUS BLD VENIPUNCTURE: CPT | Performed by: INTERNAL MEDICINE

## 2020-08-20 PROCEDURE — G8427 DOCREV CUR MEDS BY ELIG CLIN: HCPCS | Performed by: INTERNAL MEDICINE

## 2020-08-20 PROCEDURE — 1123F ACP DISCUSS/DSCN MKR DOCD: CPT | Performed by: INTERNAL MEDICINE

## 2020-08-20 PROCEDURE — G8399 PT W/DXA RESULTS DOCUMENT: HCPCS | Performed by: INTERNAL MEDICINE

## 2020-08-20 RX ORDER — LOSARTAN POTASSIUM 50 MG/1
50 TABLET ORAL DAILY
Qty: 90 TABLET | Refills: 1 | Status: SHIPPED | OUTPATIENT
Start: 2020-08-20 | End: 2021-03-12 | Stop reason: SDUPTHER

## 2020-08-21 LAB
A/G RATIO: 1.3 (ref 1.1–2.2)
ALBUMIN SERPL-MCNC: 4.5 G/DL (ref 3.4–5)
ALP BLD-CCNC: 100 U/L (ref 40–129)
ALT SERPL-CCNC: 13 U/L (ref 10–40)
ANION GAP SERPL CALCULATED.3IONS-SCNC: 12 MMOL/L (ref 3–16)
AST SERPL-CCNC: 27 U/L (ref 15–37)
BILIRUB SERPL-MCNC: 0.7 MG/DL (ref 0–1)
BUN BLDV-MCNC: 14 MG/DL (ref 7–20)
CALCIUM SERPL-MCNC: 9.6 MG/DL (ref 8.3–10.6)
CHLORIDE BLD-SCNC: 102 MMOL/L (ref 99–110)
CHOLESTEROL, FASTING: 220 MG/DL (ref 0–199)
CO2: 27 MMOL/L (ref 21–32)
CREAT SERPL-MCNC: 0.7 MG/DL (ref 0.6–1.2)
GFR AFRICAN AMERICAN: >60
GFR NON-AFRICAN AMERICAN: >60
GLOBULIN: 3.5 G/DL
GLUCOSE FASTING: 91 MG/DL (ref 70–99)
HDLC SERPL-MCNC: 63 MG/DL (ref 40–60)
LDL CHOLESTEROL CALCULATED: 144 MG/DL
POTASSIUM SERPL-SCNC: 4.2 MMOL/L (ref 3.5–5.1)
SODIUM BLD-SCNC: 141 MMOL/L (ref 136–145)
TOTAL PROTEIN: 8 G/DL (ref 6.4–8.2)
TRIGLYCERIDE, FASTING: 66 MG/DL (ref 0–150)
TSH SERPL DL<=0.05 MIU/L-ACNC: 1.84 UIU/ML (ref 0.27–4.2)
VLDLC SERPL CALC-MCNC: 13 MG/DL

## 2020-10-22 ENCOUNTER — TELEPHONE (OUTPATIENT)
Dept: INTERNAL MEDICINE CLINIC | Age: 75
End: 2020-10-22

## 2020-10-22 NOTE — TELEPHONE ENCOUNTER
----- Message from Elton George sent at 10/22/2020  3:11 PM EDT -----  Subject: Appointment Request    Reason for Call: Routine Pre-Op    QUESTIONS  Type of Appointment? Established Patient  Reason for appointment request? Available appointments did not meet   patient need  Additional Information for Provider? Patient has surgery on 11/10 and need   clearance prior to what's available .  ---------------------------------------------------------------------------  --------------  CALL BACK INFO  What is the best way for the office to contact you? OK to leave message on   voicemail  Preferred Call Back Phone Number? [phone]  ---------------------------------------------------------------------------  --------------  SCRIPT ANSWERS  Relationship to Patient? Self  Appointment reason? Symptomatic  Select script based on patient symptoms? Adult Pre-Op  Do you have question for your provider that need to be answered prior to   scheduling your pre-op appointment? No  Have you been diagnosed with   tested for   or told that you are suspected of having COVID-19 (Coronavirus)? Yes  Did your symptoms begin or have you been tested for COVID-19 in the last   10 days? No  Have you had a fever or taken medication to treat a fever within the past   3 days? No  Have you had a cough   shortness of breath or flu-like symptoms within the past 3 days? No  Do you currently have flu-like symptoms including fever or chills   cough   shortness of breath   or difficulty breathing   or new loss of taste or smell? No  (Service Expert  click yes below to proceed with [employer] As Usual   Scheduling)?  Yes

## 2020-10-29 ENCOUNTER — OFFICE VISIT (OUTPATIENT)
Dept: INTERNAL MEDICINE CLINIC | Age: 75
End: 2020-10-29
Payer: MEDICARE

## 2020-10-29 VITALS
SYSTOLIC BLOOD PRESSURE: 132 MMHG | TEMPERATURE: 96.7 F | DIASTOLIC BLOOD PRESSURE: 80 MMHG | WEIGHT: 228 LBS | BODY MASS INDEX: 35.71 KG/M2 | HEART RATE: 76 BPM

## 2020-10-29 PROCEDURE — G8417 CALC BMI ABV UP PARAM F/U: HCPCS | Performed by: INTERNAL MEDICINE

## 2020-10-29 PROCEDURE — 93000 ELECTROCARDIOGRAM COMPLETE: CPT | Performed by: INTERNAL MEDICINE

## 2020-10-29 PROCEDURE — 1036F TOBACCO NON-USER: CPT | Performed by: INTERNAL MEDICINE

## 2020-10-29 PROCEDURE — 1123F ACP DISCUSS/DSCN MKR DOCD: CPT | Performed by: INTERNAL MEDICINE

## 2020-10-29 PROCEDURE — G8427 DOCREV CUR MEDS BY ELIG CLIN: HCPCS | Performed by: INTERNAL MEDICINE

## 2020-10-29 PROCEDURE — G8399 PT W/DXA RESULTS DOCUMENT: HCPCS | Performed by: INTERNAL MEDICINE

## 2020-10-29 PROCEDURE — 1090F PRES/ABSN URINE INCON ASSESS: CPT | Performed by: INTERNAL MEDICINE

## 2020-10-29 PROCEDURE — 4040F PNEUMOC VAC/ADMIN/RCVD: CPT | Performed by: INTERNAL MEDICINE

## 2020-10-29 PROCEDURE — G8482 FLU IMMUNIZE ORDER/ADMIN: HCPCS | Performed by: INTERNAL MEDICINE

## 2020-10-29 PROCEDURE — 99214 OFFICE O/P EST MOD 30 MIN: CPT | Performed by: INTERNAL MEDICINE

## 2020-10-29 RX ORDER — DEXLANSOPRAZOLE 60 MG/1
60 CAPSULE, DELAYED RELEASE ORAL NIGHTLY
Qty: 90 CAPSULE | Refills: 0 | Status: SHIPPED | OUTPATIENT
Start: 2020-10-29 | End: 2021-04-15 | Stop reason: SDUPTHER

## 2020-10-29 ASSESSMENT — ENCOUNTER SYMPTOMS: RESPIRATORY NEGATIVE: 1

## 2020-10-29 NOTE — PROGRESS NOTES
Preoperative Consultation      Malika Philip  YOB: 1945    Date of Service:  10/29/2020    Chief Complaint   Patient presents with    Pre-op Exam     Tear duct, November 10, @ [facility] & Dr. Gifty Casey        HPI:    This patient presents to the office today for a preoperative consultation at the request of surgeon, Dr. Sherwin [facility] and Gifty Casey, who plans on performing tear duct surgery on November 10 at [facility] [place].  The current problem began 1 year ago, and symptoms have been worsening with time. Excessive tearing interferes with vision and activities. Had prior procedure 5/15, which made symptoms worse. Planned anesthesia: General   Known anesthesia problems: None   Bleeding risk: No recent or remote history of abnormal bleeding  Personal or FH of DVT/PE: Yes - mother, MGF    Recent steroid use: no  Exercise tolerance before surgery at least 4 METS (Can walk up a flight of steps or a hill or walk on level ground at 3 to 4 mph): Yes     Patient Active Problem List   Diagnosis    Hyperlipidemia    Colorectal polyps    Migraine headache    Seizure disorder ([facility] [address]    Venous insufficiency    Post-surgical hypothyroidism    Allergic rhinitis    Leukopenia    Cervical spondylosis    Spinal stenosis, lumbar    Shoulder pain, left    GERD (gastroesophageal reflux disease)    Paresthesias    Trigger ring finger    Hammer toe of right foot    Cataracts, bilateral    Obesity due to excess calories with serious comorbidity    Essential hypertension    CMC arthritis    Hyperpigmentation    Left thigh pain    Left flank pain    Bilateral epiphora     Review of Systems   Constitutional: Negative. HENT: Negative. Eyes:        See above   Respiratory: Negative. Cardiovascular: Negative. Gastrointestinal:        Recent exacerbation of GERD   Genitourinary: Negative. Musculoskeletal: Negative. Skin: Negative. Neurological: Negative.  CARPAL TUNNEL RELEASE Bilateral , ,     CHOLECYSTECTOMY  2002    FINGER TRIGGER RELEASE Right     FINGER TRIGGER RELEASE Right 13    FOOT SURGERY      with screws placed     FOOT SURGERY Right 2018    OSTEOTOMY 1ST METATARSAL WITH BUNIONECTOMY    HAMMER TOE SURGERY Right 13    HYSTERECTOMY      still with ovaries    HYSTERECTOMY, TOTAL ABDOMINAL  1983    Secondary to DUB    SHOULDER ARTHROSCOPY Left 13    THYROIDECTOMY      TOTAL KNEE ARTHROPLASTY Right      Family History   Problem Relation Age of Onset    Asthma Mother     Heart Failure Mother          61    Prostate Cancer Father          70    Diabetes Maternal Grandmother         Type II    Glaucoma Maternal Grandmother     Rheum Arthritis Neg Hx     Osteoarthritis Neg Hx     Breast Cancer Neg Hx     Cancer Neg Hx     High Cholesterol Neg Hx     Hypertension Neg Hx     Migraines Neg Hx     Ovarian Cancer Neg Hx     Rashes/Skin Problems Neg Hx     Seizures Neg Hx     Stroke Neg Hx     Thyroid Disease Neg Hx      Social History     Socioeconomic History    Marital status:      Spouse name: Not on file    Number of children: Not on file    Years of education: Not on file    Highest education level: Not on file   Occupational History    Occupation: Homemaker   Social Needs    Financial resource strain: Not hard at all   Yancy-Germania insecurity     Worry: Never true     Inability: Never true    Transportation needs     Medical: Not on file     Non-medical: Not on file   Tobacco Use    Smoking status: Never Smoker    Smokeless tobacco: Never Used   Substance and Sexual Activity    Alcohol use: No    Drug use: No    Sexual activity: Yes     Partners: Male   Lifestyle    Physical activity     Days per week: Not on file     Minutes per session: Not on file    Stress: Not on file   Relationships    Social connections     Talks on phone: Not on file     Gets together: Not on file     Attends Christian service: Not on file     Active member of club or organization: Not on file     Attends meetings of clubs or organizations: Not on file     Relationship status: Not on file    Intimate partner violence     Fear of current or ex partner: Not on file     Emotionally abused: Not on file     Physically abused: Not on file     Forced sexual activity: Not on file   Other Topics Concern    Not on file   Social History Narrative    Not on file       Vitals:    10/29/20 1159   BP: 132/80   Site: Right Upper Arm   Position: Sitting   Cuff Size: Large Adult   Pulse: 76   Temp: 96.7 °F (35.9 °C)   Weight: 228 lb (103.4 kg)       Wt Readings from Last 2 Encounters:   10/29/20 228 lb (103.4 kg)   08/20/20 229 lb 3.2 oz (104 kg)     BP Readings from Last 3 Encounters:   10/29/20 132/80   08/20/20 138/80   05/12/20 (!) 135/90      Physical Exam  Constitutional:       General: She is not in acute distress. Appearance: She is well-developed. Eyes:      Comments: Bilateral epiphora   Neck:      Musculoskeletal: Neck supple. Thyroid: No thyroid mass or thyromegaly. Vascular: No carotid bruit. Cardiovascular:      Rate and Rhythm: Normal rate and regular rhythm. Pulses: Normal pulses. Heart sounds: Murmur present. Systolic murmur present with a grade of 2/6. No friction rub. No gallop. Pulmonary:      Effort: Pulmonary effort is normal. No respiratory distress. Breath sounds: Normal breath sounds. No wheezing, rhonchi or rales. Abdominal:      General: There is no distension. Palpations: Abdomen is soft. Tenderness: There is no abdominal tenderness. Musculoskeletal:      Right lower leg: Edema (trace) present. Left lower leg: Edema (trace) present. Lymphadenopathy:      Cervical: Cervical adenopathy (Slightly enlarged, NT LN left submandibular area- unchanged) present. Skin:     General: Skin is warm and dry.    Neurological:      Mental Status: She is alert and oriented to person, place, and time. Psychiatric:         Speech: Speech normal.         Behavior: Behavior normal.         Thought Content: Thought content normal.         Judgment: Judgment normal.     EKG: normal sinus rhythm, baseline artifact in multiple leads, unchanged from previous tracings. Lab Results   Component Value Date    CHOLFAST 220 (H) 08/20/2020    TRIGLYCFAST 66 08/20/2020    HDL 63 (H) 08/20/2020    LDLCALC 144 (H) 08/20/2020     Lab Results   Component Value Date    GLUF 91 08/20/2020    LABA1C 5.7 12/02/2013     Lab Results   Component Value Date     08/20/2020    K 4.2 08/20/2020    BUN 14 08/20/2020    CREATININE 0.7 08/20/2020    LABGLOM >60 08/20/2020    GFRAA >60 08/20/2020    CALCIUM 9.6 08/20/2020     Lab Results   Component Value Date    ALT 13 08/20/2020    AST 27 08/20/2020     Lab Results   Component Value Date    HGB 13.0 08/20/2020     Lab Results   Component Value Date    TSH 1.84 08/20/2020             Assessment/Plan:     1. Bilateral epiphora  Emergent procedure No  Active Cardiac Condition (decompensated HF, Arrhythmia, MI <3 weeks, severe valve disease):  No   Risk Level of Procedure Low Risk (endoscopy, superficial skin, breast, ambulatory, or cataract, etc.)  Revised Cardiac Risk Index Risk factors: None    According to the 2014 ACC/AHA pre-operative risk assessment guidelines Akin Gathers A Bela Flight is a low risk for major cardiac complications during a low risk procedure and may continue as planned. Preoperative workup as follows: ECG, BMP  Change in medication regimen before surgery: Take losartan, Pepcid, Synthroid on morning of surgery with sip of water, and hold all other medications until after surgery, Discontinue ASA 7 days before surgery.   Prophylaxis for cardiac events with perioperative beta-blockers:Not indicated    Deep vein thrombosis prophylaxis: regimen to be chosen by surgical team    Further recommendations requested from

## 2020-12-28 RX ORDER — LEVOTHYROXINE SODIUM 88 MCG
TABLET ORAL
Qty: 90 TABLET | Refills: 1 | Status: SHIPPED | OUTPATIENT
Start: 2020-12-28 | End: 2021-07-02

## 2021-03-12 RX ORDER — LOSARTAN POTASSIUM 50 MG/1
50 TABLET ORAL DAILY
Qty: 90 TABLET | Refills: 1 | Status: SHIPPED | OUTPATIENT
Start: 2021-03-12 | End: 2021-08-22

## 2021-04-14 PROBLEM — M79.652 LEFT THIGH PAIN: Status: RESOLVED | Noted: 2019-01-22 | Resolved: 2021-04-14

## 2021-04-14 PROBLEM — R10.9 LEFT FLANK PAIN: Status: RESOLVED | Noted: 2019-04-12 | Resolved: 2021-04-14

## 2021-04-14 PROBLEM — H04.203 BILATERAL EPIPHORA: Status: RESOLVED | Noted: 2020-03-12 | Resolved: 2021-04-14

## 2021-04-14 NOTE — PROGRESS NOTES
Date of Visit:  4/15/2021    CC: Teresa Prather (: 1945) is a 64 y.o. female, established patient, here for evaluation/re-evaluation of the following medical concerns:    ASSESSMENT/PLAN:  Essential hypertension  Assessment & Plan:  Well-controlled. Continue current antihypertensive regimen. Orders:  -     Comprehensive Metabolic Panel, Fasting; Future  Venous insufficiency  Assessment & Plan:  Adequately controlled on intermittent diuretic and compression stockings- continue. Pure hypercholesterolemia  Assessment & Plan:  Importance of continued lifestyle changes stressed to help prevent need for cholesterol medication in the future. Orders:  -     Lipid, Fasting; Future  Post-surgical hypothyroidism  Assessment & Plan:  Fatigue and difficulty losing weight are likely multifactorial, but will adjust levothyroxine dose if indicated by lab results. Orders:  -     TSH without Reflex; Future  Seizure disorder [facility])  Assessment & Plan:  Stable. Secondary to multiple meningiomas/resections. On long-term dilantin until , when it was replaced with Keppra due to neutropenia. Encephalomalacia on MRI suggests high risk for recurrence. Continue regular follow-up with neurology. Other drug-induced neutropenia ([facility] [place] [address]  Assessment & Plan:  Due to long-term effects of dilantin on bone marrow. Asymptomatic, stable. Orders:  -     CBC Auto Differential; Future  Class 2 severe obesity due to excess calories with serious comorbidity and body mass index (BMI) of 35.0 to 35.9 in adult [facility])  Assessment & Plan:  Slow steady improvement. Patient was asked about her current diet and exercise habits, and personalized advice was provided regarding recommended lifestyle changes. Patient's comorbid health conditions associated with elevated BMI were discussed, as well as the likely benefits of weight loss.   Based upon patient's motivation to change her behavior, the following plan was agreed upon to work toward a weight loss goal of 35-40 pounds: continue exercise for at least 30 minutes 4-5 days per week and lower carb diet. Educational materials for  weight loss were provided. Patient will follow-up in 6 month(s) with PCP. Provider spent 15 minutes counseling patient. Need for hepatitis C screening test  -     Hepatitis C Antibody; Future     Return in about 6 months (around 10/15/2021) for MEDICARE AW. Vitals:    04/15/21 1042   BP: 130/84   Pulse: 76   Weight: 225 lb (102.1 kg)   Height: 5' 7\" (1.702 m)      Estimated body mass index is 35.24 kg/m² as calculated from the following:    Height as of this encounter: 5' 7\" (1.702 m). Weight as of this encounter: 225 lb (102.1 kg). Wt Readings from Last 3 Encounters:   04/15/21 225 lb (102.1 kg)   10/29/20 228 lb (103.4 kg)   08/20/20 229 lb 3.2 oz (104 kg)     BP Readings from Last 3 Encounters:   04/15/21 130/84   10/29/20 132/80   08/20/20 138/80     HPI  Hypertension/edema:  Usually runs 120s-130s/70-low 80s. No CP, SOB, visual changes, dizziness, palpitations. HAs resolved. Trying to avoid dietary sodium. No NSAIDs or stimulants. Taking diuretic about 2-3x/week for LE edema, which is well-controlled. Has leg cramps if takes diuretic any more often. Can only tolerate compression stockings in the winter- also wears during travel. No adverse effects with losartan. Obesity/Hyperlipidemia:  Trying to follow lower carb/portion-controlled diet. Has met with our dietitian. Riding exercise bicycle 4x/week and walking when weather acceptable. Has lost about 5 pounds over the past 9 months. Hypothyroidism: Recent symptoms: fatigue, difficulty losing weight- improved. She denies palpitations, dry skin, weight loss, hair loss, constipation, diarrhea. Patient is taking her medication consistently on an empty stomach. ROS  As documented above    Physical Exam  Constitutional:       General: She is not in acute distress.      Appearance: She

## 2021-04-15 ENCOUNTER — OFFICE VISIT (OUTPATIENT)
Dept: INTERNAL MEDICINE CLINIC | Age: 76
End: 2021-04-15
Payer: MEDICARE

## 2021-04-15 VITALS
HEART RATE: 76 BPM | DIASTOLIC BLOOD PRESSURE: 84 MMHG | HEIGHT: 67 IN | WEIGHT: 225 LBS | SYSTOLIC BLOOD PRESSURE: 130 MMHG | BODY MASS INDEX: 35.31 KG/M2

## 2021-04-15 DIAGNOSIS — E78.00 PURE HYPERCHOLESTEROLEMIA: Chronic | ICD-10-CM

## 2021-04-15 DIAGNOSIS — I10 ESSENTIAL HYPERTENSION: Primary | ICD-10-CM

## 2021-04-15 DIAGNOSIS — Z11.59 NEED FOR HEPATITIS C SCREENING TEST: ICD-10-CM

## 2021-04-15 DIAGNOSIS — I87.2 VENOUS INSUFFICIENCY: Chronic | ICD-10-CM

## 2021-04-15 DIAGNOSIS — G40.909 SEIZURE DISORDER (HCC): Chronic | ICD-10-CM

## 2021-04-15 DIAGNOSIS — D70.2 OTHER DRUG-INDUCED NEUTROPENIA (HCC): Chronic | ICD-10-CM

## 2021-04-15 DIAGNOSIS — E87.5 HYPERKALEMIA: ICD-10-CM

## 2021-04-15 DIAGNOSIS — E66.01 CLASS 2 SEVERE OBESITY DUE TO EXCESS CALORIES WITH SERIOUS COMORBIDITY AND BODY MASS INDEX (BMI) OF 35.0 TO 35.9 IN ADULT (HCC): ICD-10-CM

## 2021-04-15 DIAGNOSIS — E89.0 POST-SURGICAL HYPOTHYROIDISM: Chronic | ICD-10-CM

## 2021-04-15 PROCEDURE — 1090F PRES/ABSN URINE INCON ASSESS: CPT | Performed by: INTERNAL MEDICINE

## 2021-04-15 PROCEDURE — G8427 DOCREV CUR MEDS BY ELIG CLIN: HCPCS | Performed by: INTERNAL MEDICINE

## 2021-04-15 PROCEDURE — 99214 OFFICE O/P EST MOD 30 MIN: CPT | Performed by: INTERNAL MEDICINE

## 2021-04-15 PROCEDURE — G0447 BEHAVIOR COUNSEL OBESITY 15M: HCPCS | Performed by: INTERNAL MEDICINE

## 2021-04-15 PROCEDURE — G8399 PT W/DXA RESULTS DOCUMENT: HCPCS | Performed by: INTERNAL MEDICINE

## 2021-04-15 PROCEDURE — 1036F TOBACCO NON-USER: CPT | Performed by: INTERNAL MEDICINE

## 2021-04-15 PROCEDURE — 1123F ACP DISCUSS/DSCN MKR DOCD: CPT | Performed by: INTERNAL MEDICINE

## 2021-04-15 PROCEDURE — G8417 CALC BMI ABV UP PARAM F/U: HCPCS | Performed by: INTERNAL MEDICINE

## 2021-04-15 PROCEDURE — 4040F PNEUMOC VAC/ADMIN/RCVD: CPT | Performed by: INTERNAL MEDICINE

## 2021-04-15 RX ORDER — FAMOTIDINE 20 MG/1
TABLET, FILM COATED ORAL
Qty: 180 TABLET | Refills: 1 | Status: SHIPPED | OUTPATIENT
Start: 2021-04-15 | End: 2021-11-29

## 2021-04-15 RX ORDER — DEXLANSOPRAZOLE 60 MG/1
60 CAPSULE, DELAYED RELEASE ORAL NIGHTLY
Qty: 90 CAPSULE | Refills: 1 | Status: SHIPPED | OUTPATIENT
Start: 2021-04-15 | End: 2021-04-16

## 2021-04-15 ASSESSMENT — PATIENT HEALTH QUESTIONNAIRE - PHQ9
SUM OF ALL RESPONSES TO PHQ QUESTIONS 1-9: 0
SUM OF ALL RESPONSES TO PHQ9 QUESTIONS 1 & 2: 0
SUM OF ALL RESPONSES TO PHQ QUESTIONS 1-9: 0
2. FEELING DOWN, DEPRESSED OR HOPELESS: 0

## 2021-04-15 NOTE — ASSESSMENT & PLAN NOTE
Fatigue and difficulty losing weight are likely multifactorial, but will adjust levothyroxine dose if indicated by lab results.

## 2021-04-15 NOTE — ASSESSMENT & PLAN NOTE
Slow steady improvement. Patient was asked about her current diet and exercise habits, and personalized advice was provided regarding recommended lifestyle changes. Patient's comorbid health conditions associated with elevated BMI were discussed, as well as the likely benefits of weight loss. Based upon patient's motivation to change her behavior, the following plan was agreed upon to work toward a weight loss goal of 35-40 pounds: continue exercise for at least 30 minutes 4-5 days per week and lower carb diet. Educational materials for  weight loss were provided. Patient will follow-up in 6 month(s) with PCP. Provider spent 15 minutes counseling patient.

## 2021-04-15 NOTE — ASSESSMENT & PLAN NOTE
Stable. Secondary to multiple meningiomas/resections. On long-term dilantin until 4/12, when it was replaced with Keppra due to neutropenia. Encephalomalacia on MRI suggests high risk for recurrence. Continue regular follow-up with neurology.

## 2021-04-15 NOTE — ASSESSMENT & PLAN NOTE
Importance of continued lifestyle changes stressed to help prevent need for cholesterol medication in the future.

## 2021-04-15 NOTE — LETTER
[facility]) Physicians [address]  [address] [facility] [address]  Phone: [phone]  Fax: [phone]    Luigi Espinoza MD        April 15, 2021     Patient: Jet Tenorio   YOB: 1945   Date of Visit: 4/15/2021       To Whom It May Concern: It is my medical opinion that Jet Tenorio requires a disability parking placard for the following reasons:  She has limited walking ability due to an orthopedic condition. Duration of need: 5 years    If you have any questions or concerns, please don't hesitate to call.     Sincerely,        Luigi Espinoza MD

## 2021-04-16 ENCOUNTER — TELEPHONE (OUTPATIENT)
Dept: INTERNAL MEDICINE CLINIC | Age: 76
End: 2021-04-16

## 2021-04-16 ENCOUNTER — HOSPITAL ENCOUNTER (OUTPATIENT)
Age: 76
Discharge: HOME | End: 2021-04-16
Payer: MEDICARE

## 2021-04-16 DIAGNOSIS — E87.5 HYPERKALEMIA: ICD-10-CM

## 2021-04-16 LAB — POTASSIUM SERPL-SCNC: 4.8 MMOL/L (ref 3.5–5.1)

## 2021-04-16 PROCEDURE — 36415 COLL VENOUS BLD VENIPUNCTURE: CPT

## 2021-04-16 PROCEDURE — 84132 ASSAY OF SERUM POTASSIUM: CPT

## 2021-04-16 RX ORDER — PANTOPRAZOLE SODIUM 40 MG/1
40 TABLET, DELAYED RELEASE ORAL
Qty: 90 TABLET | Refills: 1 | Status: SHIPPED | OUTPATIENT
Start: 2021-04-16 | End: 2021-09-14

## 2021-04-16 NOTE — TELEPHONE ENCOUNTER
Patient was calling about her lab results of the K.  I told her the number and that it was probably an error with the previous ran test.  I also questioned her on wether she has had omeprazole or pantoprazole DR. She denies ever trying either and she is ok giving one or the other a try. The paperwork is in your folder.

## 2021-04-16 NOTE — TELEPHONE ENCOUNTER
----- Message from [facility] - INPATIENT sent at 4/16/2021 12:25 PM EDT -----  Subject: Message to Provider    QUESTIONS  Information for Provider? Requesting to speak with pcp , question in   regards to medication and labs . Please follow up to advise   ---------------------------------------------------------------------------  --------------  CALL BACK INFO  What is the best way for the office to contact you? OK to leave message on   voicemail  Preferred Call Back Phone Number? [phone]  ---------------------------------------------------------------------------  --------------  SCRIPT ANSWERS  Relationship to Patient?  Self

## 2021-05-06 ENCOUNTER — OFFICE VISIT (OUTPATIENT)
Dept: GYNECOLOGY | Age: 76
End: 2021-05-06
Payer: MEDICARE

## 2021-05-06 VITALS
RESPIRATION RATE: 17 BRPM | HEART RATE: 63 BPM | DIASTOLIC BLOOD PRESSURE: 75 MMHG | BODY MASS INDEX: 35.87 KG/M2 | WEIGHT: 223.2 LBS | SYSTOLIC BLOOD PRESSURE: 147 MMHG | HEIGHT: 66 IN

## 2021-05-06 DIAGNOSIS — Z12.31 ENCOUNTER FOR SCREENING MAMMOGRAM FOR MALIGNANT NEOPLASM OF BREAST: ICD-10-CM

## 2021-05-06 DIAGNOSIS — N89.8 VAGINAL DRYNESS: ICD-10-CM

## 2021-05-06 DIAGNOSIS — Z01.419 WELL WOMAN EXAM WITH ROUTINE GYNECOLOGICAL EXAM: Primary | ICD-10-CM

## 2021-05-06 PROCEDURE — G0101 CA SCREEN;PELVIC/BREAST EXAM: HCPCS | Performed by: OBSTETRICS & GYNECOLOGY

## 2021-05-07 ASSESSMENT — ENCOUNTER SYMPTOMS
GASTROINTESTINAL NEGATIVE: 1
RESPIRATORY NEGATIVE: 1
EYES NEGATIVE: 1

## 2021-05-08 NOTE — PROGRESS NOTES
Outpatient Medications Marked as Taking for the 21 encounter (Office Visit) with Armando Soler MD   Medication Sig Dispense Refill    triamcinolone (KENALOG) 0.1 % ointment Apply topically nightly 30 g 3    pantoprazole (PROTONIX) 40 MG tablet Take 1 tablet by mouth every morning (before breakfast) 90 tablet 1    famotidine (PEPCID) 20 MG tablet TAKE 1 TABLET TWICE A  tablet 1    losartan (COZAAR) 50 MG tablet Take 1 tablet by mouth daily 90 tablet 1    SYNTHROID 88 MCG tablet TAKE 1 TABLET DAILY 90 tablet 1    mometasone (NASONEX) 50 MCG/ACT nasal spray 2 sprays by Nasal route daily 3 Inhaler 1    Aspirin-Acetaminophen-Caffeine (EXCEDRIN PO) Take by mouth as needed      triamterene-hydrochlorothiazide (DYAZIDE) 37.5-25 MG per capsule Take 1 capsule by mouth three times a week       Cyanocobalamin (VITAMIN B 12 PO) Take 500 mcg by mouth daily.  levetiracetam (KEPPRA) 500 MG tablet Take 500 mg by mouth 2 times daily.  Ascorbic Acid (VITAMIN C PO) Take 500 mg by mouth daily       aspirin 81 MG EC tablet Take 81 mg by mouth daily.  Calcium Citrate-Vitamin D (CALCIUM CITRATE + D PO) Take 1 tablet by mouth 2 times daily (with meals).        Family History   Problem Relation Age of Onset    Asthma Mother     Heart Failure Mother          61    Prostate Cancer Father          70    Diabetes Maternal Grandmother         Type II    Glaucoma Maternal Grandmother     Rheum Arthritis Neg Hx     Osteoarthritis Neg Hx     Breast Cancer Neg Hx     Cancer Neg Hx     High Cholesterol Neg Hx     Hypertension Neg Hx     Migraines Neg Hx     Ovarian Cancer Neg Hx     Rashes/Skin Problems Neg Hx     Seizures Neg Hx     Stroke Neg Hx     Thyroid Disease Neg Hx      BP (!) 147/75 (Site: Right Upper Arm, Position: Sitting, Cuff Size: Large Adult)   Pulse 63   Resp 17   Ht 5' 6\" (1.676 m)   Wt 223 lb 3.2 oz (101.2 kg)   BMI 36.03 kg/m²       Objective:   Physical Exam  Constitutional:       General: She is not in acute distress. Appearance: Normal appearance. She is well-developed and normal weight. She is not diaphoretic. HENT:      Head: Normocephalic. Nose: Nose normal.      Mouth/Throat:      Mouth: Mucous membranes are moist.      Pharynx: Oropharynx is clear. Eyes:      Pupils: Pupils are equal, round, and reactive to light. Neck:      Musculoskeletal: Normal range of motion and neck supple. No neck rigidity. Thyroid: No thyromegaly. Cardiovascular:      Rate and Rhythm: Normal rate and regular rhythm. Heart sounds: Normal heart sounds. No murmur. No friction rub. No gallop. Pulmonary:      Effort: Pulmonary effort is normal. No respiratory distress. Breath sounds: Normal breath sounds. No wheezing or rales. Chest:      Chest wall: No tenderness. Breasts:         Right: No swelling, bleeding, inverted nipple, mass, nipple discharge, skin change or tenderness. Left: No swelling, bleeding, inverted nipple, mass, nipple discharge, skin change or tenderness. Abdominal:      General: Abdomen is flat. There is no distension. Palpations: Abdomen is soft. There is no hepatomegaly or mass. Tenderness: There is no abdominal tenderness. There is no guarding or rebound. Hernia: No hernia is present. There is no hernia in the left inguinal area. Genitourinary:     Exam position: Lithotomy position. Labia:         Right: No rash, tenderness, lesion or injury. Left: No rash, tenderness, lesion or injury. Urethra: No prolapse, urethral pain, urethral swelling or urethral lesion. Vagina: Normal. No signs of injury and foreign body. No vaginal discharge, erythema, tenderness, bleeding or lesions. Adnexa: Right adnexa normal and left adnexa normal.        Right: No mass, tenderness or fullness. Left: No mass, tenderness or fullness. Rectum: Normal. Guaiac result negative.  No mass, tenderness, anal fissure, external hemorrhoid or internal hemorrhoid. Normal anal tone. Comments: Normal urethral meatus, nl urethra, nl bladder. Musculoskeletal: Normal range of motion. General: No tenderness. Lymphadenopathy:      Cervical: No cervical adenopathy. Skin:     General: Skin is warm and dry. Neurological:      General: No focal deficit present. Mental Status: She is alert and oriented to person, place, and time. Mental status is at baseline. Deep Tendon Reflexes: Reflexes are normal and symmetric. Psychiatric:         Mood and Affect: Mood normal.         Behavior: Behavior normal.         Thought Content: Thought content normal.         Judgment: Judgment normal.         Assessment:      1. Annual  2. menopause      Plan:      1.  Pap, calcium, exercise, mammogram, hemocult negative  2. stable        Markus Hurst MD

## 2021-07-02 RX ORDER — LEVOTHYROXINE SODIUM 88 MCG
TABLET ORAL
Qty: 90 TABLET | Refills: 3 | Status: SHIPPED | OUTPATIENT
Start: 2021-07-02 | End: 2022-07-18

## 2021-08-22 RX ORDER — LOSARTAN POTASSIUM 50 MG/1
TABLET ORAL
Qty: 90 TABLET | Refills: 1 | Status: SHIPPED | OUTPATIENT
Start: 2021-08-22 | End: 2022-03-18

## 2021-09-14 RX ORDER — PANTOPRAZOLE SODIUM 40 MG/1
TABLET, DELAYED RELEASE ORAL
Qty: 90 TABLET | Refills: 3 | Status: SHIPPED | OUTPATIENT
Start: 2021-09-14 | End: 2021-10-21

## 2021-10-21 ENCOUNTER — OFFICE VISIT (OUTPATIENT)
Dept: INTERNAL MEDICINE CLINIC | Age: 76
End: 2021-10-21
Payer: MEDICARE

## 2021-10-21 VITALS
BODY MASS INDEX: 35.16 KG/M2 | RESPIRATION RATE: 16 BRPM | OXYGEN SATURATION: 98 % | DIASTOLIC BLOOD PRESSURE: 80 MMHG | SYSTOLIC BLOOD PRESSURE: 124 MMHG | HEIGHT: 67 IN | HEART RATE: 66 BPM | WEIGHT: 224 LBS

## 2021-10-21 DIAGNOSIS — E66.01 CLASS 2 SEVERE OBESITY DUE TO EXCESS CALORIES WITH SERIOUS COMORBIDITY AND BODY MASS INDEX (BMI) OF 35.0 TO 35.9 IN ADULT (HCC): ICD-10-CM

## 2021-10-21 DIAGNOSIS — D70.2 OTHER DRUG-INDUCED NEUTROPENIA (HCC): Chronic | ICD-10-CM

## 2021-10-21 DIAGNOSIS — Z00.00 ROUTINE GENERAL MEDICAL EXAMINATION AT A HEALTH CARE FACILITY: Primary | ICD-10-CM

## 2021-10-21 DIAGNOSIS — I10 ESSENTIAL HYPERTENSION: ICD-10-CM

## 2021-10-21 DIAGNOSIS — E89.0 POST-SURGICAL HYPOTHYROIDISM: Chronic | ICD-10-CM

## 2021-10-21 PROBLEM — I36.1 NONRHEUMATIC TRICUSPID VALVE REGURGITATION: Status: ACTIVE | Noted: 2021-10-21

## 2021-10-21 PROCEDURE — G8484 FLU IMMUNIZE NO ADMIN: HCPCS | Performed by: INTERNAL MEDICINE

## 2021-10-21 PROCEDURE — G0447 BEHAVIOR COUNSEL OBESITY 15M: HCPCS | Performed by: INTERNAL MEDICINE

## 2021-10-21 PROCEDURE — G0438 PPPS, INITIAL VISIT: HCPCS | Performed by: INTERNAL MEDICINE

## 2021-10-21 PROCEDURE — 1123F ACP DISCUSS/DSCN MKR DOCD: CPT | Performed by: INTERNAL MEDICINE

## 2021-10-21 PROCEDURE — 4040F PNEUMOC VAC/ADMIN/RCVD: CPT | Performed by: INTERNAL MEDICINE

## 2021-10-21 RX ORDER — TRIAMTERENE AND HYDROCHLOROTHIAZIDE 37.5; 25 MG/1; MG/1
0.5 TABLET ORAL DAILY
Qty: 45 TABLET | Refills: 1 | Status: SHIPPED | OUTPATIENT
Start: 2021-10-21

## 2021-10-21 SDOH — ECONOMIC STABILITY: FOOD INSECURITY: WITHIN THE PAST 12 MONTHS, THE FOOD YOU BOUGHT JUST DIDN'T LAST AND YOU DIDN'T HAVE MONEY TO GET MORE.: NEVER TRUE

## 2021-10-21 SDOH — ECONOMIC STABILITY: FOOD INSECURITY: WITHIN THE PAST 12 MONTHS, YOU WORRIED THAT YOUR FOOD WOULD RUN OUT BEFORE YOU GOT MONEY TO BUY MORE.: NEVER TRUE

## 2021-10-21 SDOH — ECONOMIC STABILITY: TRANSPORTATION INSECURITY
IN THE PAST 12 MONTHS, HAS LACK OF TRANSPORTATION KEPT YOU FROM MEETINGS, WORK, OR FROM GETTING THINGS NEEDED FOR DAILY LIVING?: NO

## 2021-10-21 SDOH — HEALTH STABILITY: PHYSICAL HEALTH: ON AVERAGE, HOW MANY MINUTES DO YOU ENGAGE IN EXERCISE AT THIS LEVEL?: 40 MIN

## 2021-10-21 SDOH — ECONOMIC STABILITY: TRANSPORTATION INSECURITY
IN THE PAST 12 MONTHS, HAS THE LACK OF TRANSPORTATION KEPT YOU FROM MEDICAL APPOINTMENTS OR FROM GETTING MEDICATIONS?: NO

## 2021-10-21 SDOH — ECONOMIC STABILITY: HOUSING INSECURITY
IN THE LAST 12 MONTHS, WAS THERE A TIME WHEN YOU DID NOT HAVE A STEADY PLACE TO SLEEP OR SLEPT IN A SHELTER (INCLUDING NOW)?: NO

## 2021-10-21 SDOH — ECONOMIC STABILITY: INCOME INSECURITY: IN THE LAST 12 MONTHS, WAS THERE A TIME WHEN YOU WERE NOT ABLE TO PAY THE MORTGAGE OR RENT ON TIME?: NO

## 2021-10-21 SDOH — HEALTH STABILITY: PHYSICAL HEALTH: ON AVERAGE, HOW MANY DAYS PER WEEK DO YOU ENGAGE IN MODERATE TO STRENUOUS EXERCISE (LIKE A BRISK WALK)?: 3 DAYS

## 2021-10-21 ASSESSMENT — SOCIAL DETERMINANTS OF HEALTH (SDOH)
HOW OFTEN DO YOU ATTEND CHURCH OR RELIGIOUS SERVICES?: NEVER
HOW OFTEN DO YOU ATTENT MEETINGS OF THE CLUB OR ORGANIZATION YOU BELONG TO?: NEVER
IN A TYPICAL WEEK, HOW MANY TIMES DO YOU TALK ON THE PHONE WITH FAMILY, FRIENDS, OR NEIGHBORS?: THREE TIMES A WEEK
HOW OFTEN DO YOU GET TOGETHER WITH FRIENDS OR RELATIVES?: THREE TIMES A WEEK
WITHIN THE LAST YEAR, HAVE YOU BEEN HUMILIATED OR EMOTIONALLY ABUSED IN OTHER WAYS BY YOUR PARTNER OR EX-PARTNER?: NO
WITHIN THE LAST YEAR, HAVE TO BEEN RAPED OR FORCED TO HAVE ANY KIND OF SEXUAL ACTIVITY BY YOUR PARTNER OR EX-PARTNER?: NO
WITHIN THE LAST YEAR, HAVE YOU BEEN KICKED, HIT, SLAPPED, OR OTHERWISE PHYSICALLY HURT BY YOUR PARTNER OR EX-PARTNER?: NO
DO YOU BELONG TO ANY CLUBS OR ORGANIZATIONS SUCH AS CHURCH GROUPS UNIONS, FRATERNAL OR ATHLETIC GROUPS, OR SCHOOL GROUPS?: NO
HOW HARD IS IT FOR YOU TO PAY FOR THE VERY BASICS LIKE FOOD, HOUSING, MEDICAL CARE, AND HEATING?: NOT HARD AT ALL
WITHIN THE LAST YEAR, HAVE YOU BEEN AFRAID OF YOUR PARTNER OR EX-PARTNER?: NO

## 2021-10-21 ASSESSMENT — PATIENT HEALTH QUESTIONNAIRE - PHQ9
SUM OF ALL RESPONSES TO PHQ QUESTIONS 1-9: 0
1. LITTLE INTEREST OR PLEASURE IN DOING THINGS: 0
SUM OF ALL RESPONSES TO PHQ9 QUESTIONS 1 & 2: 0
SUM OF ALL RESPONSES TO PHQ QUESTIONS 1-9: 0
SUM OF ALL RESPONSES TO PHQ QUESTIONS 1-9: 0
2. FEELING DOWN, DEPRESSED OR HOPELESS: 0

## 2021-10-21 ASSESSMENT — LIFESTYLE VARIABLES
HOW OFTEN DO YOU HAVE A DRINK CONTAINING ALCOHOL: 0
HOW OFTEN DO YOU HAVE A DRINK CONTAINING ALCOHOL: NEVER

## 2021-10-21 NOTE — PROGRESS NOTES
Medicare Annual Wellness Visit  Name: Anny Salcedo Date: 10/21/2021   MRN: [id number] Sex: Female   Age: 64 y.o. Ethnicity: Non- / Non    : 1945 Race: Dorcas Redmond / Renetta Alissa A Nate Gu is here for Medicare AWV    Screenings for behavioral, psychosocial and functional/safety risks, and cognitive dysfunction are all negative except as indicated below. These results, as well as other patient data from the [address] form, are documented in Flowsheets linked to this Encounter. Allergies   Allergen Reactions    Latex Anaphylaxis    Sulfa Antibiotics      \"GAVE ME A TERRIBLE YEAST INFECTION\"         Prior to Visit Medications    Medication Sig Taking? Authorizing Provider   triamterene-hydroCHLOROthiazide (MAXZIDE-25) 37.5-25 MG per tablet Take 0.5 tablets by mouth daily Yes Gustavo Botello MD   losartan (COZAAR) 50 MG tablet TAKE 1 TABLET DAILY Yes Gustavo Botello MD   SYNTHROID 88 MCG tablet TAKE 1 TABLET DAILY Yes Gustavo Botello MD   triamcinolone (KENALOG) 0.1 % ointment Apply topically nightly Yes Oz Mcgregor MD   famotidine (PEPCID) 20 MG tablet TAKE 1 TABLET TWICE A DAY Yes Gustavo Botello MD   mometasone (NASONEX) 50 MCG/ACT nasal spray 2 sprays by Nasal route daily Yes Gustavo Botello MD   Aspirin-Acetaminophen-Caffeine (EXCEDRIN PO) Take by mouth as needed Yes Historical Provider, MD   triamterene-hydrochlorothiazide (DYAZIDE) 37.5-25 MG per capsule Take 1 capsule by mouth three times a week  Yes Gustavo Botello MD   Cyanocobalamin (VITAMIN B 12 PO) Take 500 mcg by mouth daily. Yes Historical Provider, MD   levetiracetam (KEPPRA) 500 MG tablet Take 500 mg by mouth 2 times daily. Yes Historical Provider, MD   Ascorbic Acid (VITAMIN C PO) Take 500 mg by mouth daily  Yes Historical Provider, MD   aspirin 81 MG EC tablet Take 81 mg by mouth daily.  Yes Historical Provider, MD   Calcium Citrate-Vitamin D (CALCIUM CITRATE + D PO) Take 1 tablet by mouth 2 times daily (with meals).  Yes Historical Provider, MD         Past Medical History:   Diagnosis Date    Allergic rhinitis     Bell's palsy 2005    Left    Cervical spondylosis     C5-7    Closed fracture of fifth metatarsal bone 2011    Left- displaced    Colorectal polyps     Essential hypertension     GERD (gastroesophageal reflux disease)      EGD - mild gastritis    Goiter     Multinodular- s/p thyroidectomy     Hyperlipidemia     Irregular uterine bleeding     Meningiomas, multiple ([address]     Menorrhagia     Migraine headache     Osteoarthritis     Post-surgical hypothyroidism     Seizure disorder ([address]     Secondary to meningioma    Spinal stenosis, lumbar     L3-4, L4-5: moderately severe    Tricuspid regurgitation     Trigger finger     Right    Vaginal prolapse     Venous insufficiency        Past Surgical History:   Procedure Laterality Date    ARTHROPLASTY Left     4th toe    BLEPHAROPLASTY Bilateral     lower eyelid    BRAIN MENINGIOMA EXCISION  ,     CARPAL TUNNEL RELEASE Bilateral , ,     CHOLECYSTECTOMY  2002    EYE SURGERY Left 11/10/2020    REVISION LEFT ENDOSCOPIC DACRYOCYSTORHINOSTOMY/ DACRYOCYSTORHINOSTOMY WITH STENT PLACEMENT, LEFT SIDE     FINGER TRIGGER RELEASE Right     FINGER TRIGGER RELEASE Right 13    FOOT SURGERY      with screws placed     FOOT SURGERY Right 2018    OSTEOTOMY 1ST METATARSAL WITH BUNIONECTOMY    HAMMER TOE SURGERY Right 13    HYSTERECTOMY      still with ovaries    HYSTERECTOMY, TOTAL ABDOMINAL  1983    Secondary to DUB    SHOULDER ARTHROSCOPY Left 13    THYROIDECTOMY      TOTAL KNEE ARTHROPLASTY Right          Family History   Problem Relation Age of Onset    Asthma Mother     Heart Failure Mother          61    Prostate Cancer Father          70    Diabetes Maternal Grandmother         Type II    Glaucoma Maternal Grandmother  Rheum Arthritis Neg Hx     Osteoarthritis Neg Hx     Breast Cancer Neg Hx     Cancer Neg Hx     High Cholesterol Neg Hx     Hypertension Neg Hx     Migraines Neg Hx     Ovarian Cancer Neg Hx     Rashes/Skin Problems Neg Hx     Seizures Neg Hx     Stroke Neg Hx     Thyroid Disease Neg Hx        CareTeam (Including outside providers/suppliers regularly involved in providing care):   Patient Care Team:  Daly Browne MD as PCP - Barbara Paredes MD as PCP - [address] [place] [address] Provider  Alfonso Mcallister MD as Consulting Physician (Gastroenterology)  Debbie Torres MD as Consulting Physician (Physical Medicine and Rehab)  Hector Jimenez MD as Consulting Physician (Neurology)  Colin Sanchez MD as Consulting Physician (Orthopedic Surgery)  Shelva Spurling, MD as Consulting Physician (Neurosurgery)  Joann Beckford MD as Consulting Physician (Gastroenterology)  Kat Ocampo DO as Consulting Physician (Internal Medicine)  Shivam Osorio MD as Obstetrician (Obstetrics & Gynecology)    [employer] Readings from Last 3 Encounters:   10/21/21 224 lb (101.6 kg)   05/06/21 223 lb 3.2 oz (101.2 kg)   04/15/21 225 lb (102.1 kg)     Vitals:    10/21/21 1005   BP: 124/80   Pulse: 66   Resp: 16   SpO2: 98%   Weight: 224 lb (101.6 kg)   Height: 5' 7\" (1.702 m)     Body mass index is 35.08 kg/m². Based upon direct observation of the patient, evaluation of cognition reveals recent and remote memory intact. Physical Exam  Constitutional:       General: She is not in acute distress. Appearance: She is well-developed. Eyes:      Conjunctiva/sclera: Conjunctivae normal.   Neck:      Thyroid: No thyroid mass or thyromegaly. Vascular: No carotid bruit. Cardiovascular:      Rate and Rhythm: Normal rate and regular rhythm. Pulses: Normal pulses. Heart sounds: Murmur heard. Systolic murmur is present with a grade of 2/6. No friction rub. No gallop.     Pulmonary:      Effort: Pulmonary effort is normal. No respiratory distress. Breath sounds: Normal breath sounds. No wheezing, rhonchi or rales. Abdominal:      General: There is no distension. Palpations: Abdomen is soft. Tenderness: There is no abdominal tenderness. Musculoskeletal:      Cervical back: Neck supple. Right lower leg: Edema (+1 foot and ankle) present. Left lower leg: Edema (trace- ankle) present. Lymphadenopathy:      Cervical: Cervical adenopathy (Slightly enlarged, NT LN left submandibular area- unchanged) present. Skin:     General: Skin is warm and dry. Neurological:      Mental Status: She is alert and oriented to person, place, and time. Psychiatric:         Speech: Speech normal.         Behavior: Behavior normal.         Thought Content: Thought content normal.         Judgment: Judgment normal.       Patient's complete Health Risk Assessment and screening values have been reviewed and are found in Flowsheets. The following problems were reviewed today and where indicated follow up appointments were made and/or referrals ordered. Positive Risk Factor Screenings with Interventions:           Health Habits/Nutrition:  Health Habits/Nutrition  Do you exercise for at least 20 minutes 2-3 times per week?: Yes  Have you lost any weight without trying in the past 3 months?: No  Do you eat only one meal per day?: No  Have you seen the dentist within the past year?: Yes  Body mass index: (!) 35.08  Health Habits/Nutrition Interventions:  Nutritional issues:  patient agrees to work toward a weight loss goal of 50 pounds pounds over the next 6 month(s) using the following plan: low carbohydrate diet, exercise for at least 150 minutes/week.     Personalized Preventive Plan   Current Health Maintenance Status  Immunization History   Administered Date(s) Administered    COVID-19, Moderna, PF, 100mcg/0.5mL 01/22/2021, 02/19/2021    Influenza 10/04/2011    Influenza Virus Vaccine 10/21/2013    Influenza, High Dose (Fluzone 65 yrs and older) 09/23/2010, 10/26/2012, 10/15/2015, 11/01/2016, 09/15/2017, 10/30/2018, 11/12/2019, 09/19/2020    Pneumococcal Conjugate 13-valent Oma Abdullahi) 02/19/2016, 05/06/2016    Pneumococcal Polysaccharide ([id number]) 11/13/2008    Td (Adult), 2 Lf Tetanus Toxoid, Pf (Td, Absorbed) 04/20/2004, 05/06/2016    Td, unspecified formulation 01/01/1993, 05/06/2016    Tdap (Boostrix, Adacel) 08/22/2011    Zoster Recombinant (Shingrix) 11/19/2019        Health Maintenance   Topic Date Due    COVID-19 Vaccine (3 - Moderna risk 3-dose series) 03/19/2021    Annual Wellness Visit (AWV)  05/13/2021    TSH testing  04/15/2022    Creatinine monitoring  04/15/2022    Potassium monitoring  04/16/2022    DEXA (modify frequency per FRAX score)  10/22/2023    DTaP/Tdap/Td vaccine (3 - Td or Tdap) 05/06/2026    Flu vaccine  Completed    Shingles Vaccine  Completed    Pneumococcal 65+ years Vaccine  Completed    Hepatitis C screen  Completed    Hepatitis A vaccine  Aged Out    Hepatitis B vaccine  Aged Out    Hib vaccine  Aged Out    Meningococcal (ACWY) vaccine  Aged Out     Recommendations for [employer] Due: see orders and patient instructions/AVS.  . Recommended screening schedule for the next 5-10 years is provided to the patient in written form: see Patient Mynor Granger was seen today for medicare awv. Routine general medical examination at a health care facility  Class 2 severe obesity due to excess calories with serious comorbidity and body mass index (BMI) of 35.0 to 35.9 in adult ([facility] [address]  BMI 35.0-35.9,adult  -     ID Behavior  obesity 15m []  Obesity Counseling: Assessed behavioral health risks and factors affecting choice of behavior. Suggested weight control approaches, including dietary changes behavioral modification and follow up plan. Provided educational and support documentation.   Time spent (minutes): 15  Essential hypertension  Assessment & Plan:  Well-controlled. Will switch diuretic to tablet form, so she can take 1/2 tablet qd instead of 1 capsule 3x/week. Orders:  -     Comprehensive Metabolic Panel, Fasting; Future  Other drug-induced neutropenia ([address]  Assessment & Plan:  Due to long-term effects of dilantin on bone marrow. Asymptomatic, stable. Orders:  -     CBC Auto Differential; Future  Post-surgical hypothyroidism  Assessment & Plan:  Clinically euthyroid, but will adjust levothyroxine dose if indicated by lab results. Orders:  -     TSH without Reflex; Future    Return in about 6 months (around 4/21/2022).

## 2021-10-21 NOTE — ASSESSMENT & PLAN NOTE
Well-controlled. Will switch diuretic to tablet form, so she can take 1/2 tablet qd instead of 1 capsule 3x/week.

## 2021-10-21 NOTE — PATIENT INSTRUCTIONS
Personalized Preventive Plan for Sen Michaels - 10/21/2021  Medicare offers a range of preventive health benefits. Some of the tests and screenings are paid in full while other may be subject to a deductible, co-insurance, and/or copay. Some of these benefits include a comprehensive review of your medical history including lifestyle, illnesses that may run in your family, and various assessments and screenings as appropriate. After reviewing your medical record and screening and assessments performed today your provider may have ordered immunizations, labs, imaging, and/or referrals for you. A list of these orders (if applicable) as well as your Preventive Care list are included within your After Visit Summary for your review. Other Preventive Recommendations:    · A preventive eye exam performed by an eye specialist is recommended every 1-2 years to screen for glaucoma; cataracts, macular degeneration, and other eye disorders. · A preventive dental visit is recommended every 6 months. · Try to get at least 150 minutes of exercise per week or 10,000 steps per day on a pedometer . · Order or download the FREE \"Exercise & Physical Activity: Your Everyday Guide\" from [employer]. Call 3-448.667.8350 or search [employer] online. · You need 0182-7351 mg of calcium and 3573-6583 IU of vitamin D per day. It is possible to meet your calcium requirement with diet alone, but a vitamin D supplement is usually necessary to meet this goal.  · When exposed to the sun, use a sunscreen that protects against both UVA and UVB radiation with an SPF of 30 or greater. Reapply every 2 to 3 hours or after sweating, drying off with a towel, or swimming. · Always wear a seat belt when traveling in a car. Always wear a helmet when riding a bicycle or motorcycle.

## 2021-11-29 RX ORDER — FAMOTIDINE 20 MG/1
TABLET, FILM COATED ORAL
Qty: 180 TABLET | Refills: 3 | Status: SHIPPED | OUTPATIENT
Start: 2021-11-29 | End: 2022-04-29

## 2021-11-29 NOTE — TELEPHONE ENCOUNTER
Last appointment: 10/21/2021  Next appointment: 4/29/2022  Last refill: 4/15/2021, 180 +1    [address]

## 2022-03-18 RX ORDER — LOSARTAN POTASSIUM 50 MG/1
TABLET ORAL
Qty: 90 TABLET | Refills: 3 | Status: SHIPPED | OUTPATIENT
Start: 2022-03-18

## 2022-04-27 NOTE — PROGRESS NOTES
Date of Visit:  2022    CC: Jose Weir (: 1945) is a 76 y.o. female, established patient, here for evaluation/re-evaluation of the following medical concerns:    ASSESSMENT/PLAN:  Essential hypertension  Assessment & Plan:  Well-controlled per home readings and second reading today on daily losartan- continue. Orders:  -     Comprehensive Metabolic Panel, Fasting; Future  Venous insufficiency  Assessment & Plan:  Well-controlled on intermittent diuretic and low sodium diet- continue. Pure hypercholesterolemia  Assessment & Plan:  Importance of continued lifestyle changes stressed to help prevent need for cholesterol medication in the future. Orders:  -     Lipid, Fasting; Future  Post-surgical hypothyroidism  Assessment & Plan:  Fatigue and difficulty losing weight are likely multifactorial, but will adjust levothyroxine dose if indicated by lab results. Orders:  -     TSH; Future  Seizure disorder [facility])  Assessment & Plan:  Stable. Secondary to multiple meningiomas/resections. On long-term dilantin until , when it was replaced with Keppra due to neutropenia. Encephalomalacia on MRI suggests high risk for recurrence. Continue regular follow-up with neurology. Other drug-induced neutropenia ([facility] [address]  Assessment & Plan:  Due to long-term effects of dilantin on bone marrow. Asymptomatic, stable. Orders:  -     CBC with Auto Differential; Future  Class 2 severe obesity due to excess calories with serious comorbidity and body mass index (BMI) of 35.0 to 35.9 in adult [facility])  Assessment & Plan:  No progress. Patient was asked about her current diet and exercise habits, and personalized advice was provided regarding recommended lifestyle changes. Patient's comorbid health conditions associated with elevated BMI were discussed, as well as the likely benefits of weight loss.   Based upon patient's motivation to change her behavior, the following plan was agreed upon to work toward a weight loss goal of 35-40 pounds: continue exercise for at least 30 minutes 4-5 days per week and lower carb diet. Educational materials for  weight loss were provided. Patient will follow-up in 6 month(s) with PCP. Provider spent 15 minutes counseling patient. Body mass index 35.0-35.9, adult  -     Obesity Counseling, 15 Minutes      Return in about 6 months (around 10/29/2022) for MEDICARE AW. Vitals:    04/29/22 1008 04/29/22 1039   BP: (!) 142/72 137/78   Pulse: 76    Resp: 17    SpO2: 98%    Weight: 226 lb (102.5 kg)    Height: 5' 7\" (1.702 m)       Estimated body mass index is 35.4 kg/m² as calculated from the following:    Height as of this encounter: 5' 7\" (1.702 m). Weight as of this encounter: 226 lb (102.5 kg). Wt Readings from Last 3 Encounters:   04/29/22 226 lb (102.5 kg)   10/21/21 224 lb (101.6 kg)   05/06/21 223 lb 3.2 oz (101.2 kg)     BP Readings from Last 3 Encounters:   04/29/22 137/78   10/21/21 124/80   05/06/21 (!) 147/75     HPI  Hypertension/edema:  Usually runs 120s-130s/70-low 80s. No CP, SOB, visual changes, palpitations. No unexplained HAs. Has noticed some lightheadedness when first gets out of bed in the am, but no other orthostatic symptoms. Trying to avoid dietary sodium. No NSAIDs or stimulants. Taking diuretic about 2x/week for LE edema, which is well-controlled. Has leg cramps if takes diuretic any more often. Can only tolerate compression stockings in the winter- also wears during travel. No adverse effects with losartan. Obesity/Hyperlipidemia:  Trying to follow lower carb/portion-controlled diet, but has room for improvement. Has met with our dietitian. Exercise limited by right foot pain- improving with treatment per podiatry. No significant change in weight. Hypothyroidism: Recent symptoms: fatigue, difficulty losing weight. She denies palpitations, dry skin, weight loss, hair loss, constipation, diarrhea.  Patient is taking her medication consistently on an empty stomach. ROS  As documented above    Physical Exam  Constitutional:       General: She is not in acute distress. Appearance: She is well-developed. Eyes:      Conjunctiva/sclera: Conjunctivae normal.   Neck:      Thyroid: No thyroid mass or thyromegaly. Vascular: No carotid bruit. Cardiovascular:      Rate and Rhythm: Normal rate and regular rhythm. Pulses: Normal pulses. Heart sounds: Murmur heard. Systolic murmur is present with a grade of 2/6. No friction rub. No gallop. Pulmonary:      Effort: Pulmonary effort is normal. No respiratory distress. Breath sounds: Normal breath sounds. No wheezing, rhonchi or rales. Abdominal:      General: There is no distension. Palpations: Abdomen is soft. Tenderness: There is no abdominal tenderness. Musculoskeletal:      Cervical back: Neck supple. Right lower leg: Edema (trace) present. Left lower leg: Edema (trace) present. Lymphadenopathy:      Cervical: Cervical adenopathy (Slightly enlarged, NT LN left submandibular area- unchanged) present. Skin:     General: Skin is warm and dry. Neurological:      Mental Status: She is alert and oriented to person, place, and time. Psychiatric:         Speech: Speech normal.         Behavior: Behavior normal.         Thought Content: Thought content normal.         Judgment: Judgment normal.            --Renzo Calix MD on 4/29/2022 at 2:36 PM    An electronic signature was used to authenticate this note.

## 2022-04-29 ENCOUNTER — OFFICE VISIT (OUTPATIENT)
Dept: INTERNAL MEDICINE CLINIC | Age: 77
End: 2022-04-29
Payer: MEDICARE

## 2022-04-29 VITALS
HEIGHT: 67 IN | HEART RATE: 76 BPM | SYSTOLIC BLOOD PRESSURE: 137 MMHG | RESPIRATION RATE: 17 BRPM | WEIGHT: 226 LBS | BODY MASS INDEX: 35.47 KG/M2 | OXYGEN SATURATION: 98 % | DIASTOLIC BLOOD PRESSURE: 78 MMHG

## 2022-04-29 DIAGNOSIS — E89.0 POST-SURGICAL HYPOTHYROIDISM: Chronic | ICD-10-CM

## 2022-04-29 DIAGNOSIS — I87.2 VENOUS INSUFFICIENCY: Chronic | ICD-10-CM

## 2022-04-29 DIAGNOSIS — I10 ESSENTIAL HYPERTENSION: Primary | ICD-10-CM

## 2022-04-29 DIAGNOSIS — D70.2 OTHER DRUG-INDUCED NEUTROPENIA (HCC): Chronic | ICD-10-CM

## 2022-04-29 DIAGNOSIS — G40.909 SEIZURE DISORDER (HCC): Chronic | ICD-10-CM

## 2022-04-29 DIAGNOSIS — E78.00 PURE HYPERCHOLESTEROLEMIA: Chronic | ICD-10-CM

## 2022-04-29 DIAGNOSIS — E66.01 CLASS 2 SEVERE OBESITY DUE TO EXCESS CALORIES WITH SERIOUS COMORBIDITY AND BODY MASS INDEX (BMI) OF 35.0 TO 35.9 IN ADULT (HCC): ICD-10-CM

## 2022-04-29 PROCEDURE — 1090F PRES/ABSN URINE INCON ASSESS: CPT | Performed by: INTERNAL MEDICINE

## 2022-04-29 PROCEDURE — G8427 DOCREV CUR MEDS BY ELIG CLIN: HCPCS | Performed by: INTERNAL MEDICINE

## 2022-04-29 PROCEDURE — 99214 OFFICE O/P EST MOD 30 MIN: CPT | Performed by: INTERNAL MEDICINE

## 2022-04-29 PROCEDURE — 1123F ACP DISCUSS/DSCN MKR DOCD: CPT | Performed by: INTERNAL MEDICINE

## 2022-04-29 PROCEDURE — G8417 CALC BMI ABV UP PARAM F/U: HCPCS | Performed by: INTERNAL MEDICINE

## 2022-04-29 PROCEDURE — G0447 BEHAVIOR COUNSEL OBESITY 15M: HCPCS | Performed by: INTERNAL MEDICINE

## 2022-04-29 PROCEDURE — G0009 ADMIN PNEUMOCOCCAL VACCINE: HCPCS | Performed by: INTERNAL MEDICINE

## 2022-04-29 PROCEDURE — 90677 PCV20 VACCINE IM: CPT | Performed by: INTERNAL MEDICINE

## 2022-04-29 PROCEDURE — 1036F TOBACCO NON-USER: CPT | Performed by: INTERNAL MEDICINE

## 2022-04-29 PROCEDURE — G8399 PT W/DXA RESULTS DOCUMENT: HCPCS | Performed by: INTERNAL MEDICINE

## 2022-04-29 PROCEDURE — 4040F PNEUMOC VAC/ADMIN/RCVD: CPT | Performed by: INTERNAL MEDICINE

## 2022-04-29 RX ORDER — PANTOPRAZOLE SODIUM 40 MG/1
TABLET, DELAYED RELEASE ORAL
COMMUNITY
Start: 2021-04-16 | End: 2022-09-02 | Stop reason: SDUPTHER

## 2022-05-06 NOTE — PROGRESS NOTES
Medical Nutrition Therapy Follow Up  Patient Care Team:  Melody Suárez MD as PCP - Rachael Tarango MD as PCP - [facility] Empaneled Provider  Janeen Douglass MD as Consulting Physician (Gastroenterology)  Dory Solano MD as Consulting Physician (Physical Medicine and Rehab)  Daisy Messer MD as Consulting Physician (Neurology)  Caridad Suarez MD as Consulting Physician (Orthopedic Surgery)  Jazmine Quinteros MD as Consulting Physician (Neurosurgery)  Fawad Rey MD as Consulting Physician (Gastroenterology)  Kal Rose DO as Consulting Physician (Internal Medicine)  Danielle Strauss MD as Obstetrician (Obstetrics & Gynecology)    Reason for visit: 3 year follow up for weight management  Patient self-assessment of progress: \"I stayed at home for two years during the pandemic and got off track with eating and exercise\"        ASSESSMENT/PLAN:   NUTRITION DIAGNOSIS    #1 Problem: Overweight/Obesity (NC-3.3)  Related to: Excessive energy intake or physical inactivity  As Evidenced by: BMI more than normative standard for age and sex (BMI=35.40)         NUTRITION INTERVENTION  Nutrition Prescription: Follow Plate Guide to control poritons    NUTRITION MONITORING AND EVALUATION  Indicator/Goal Criteria   #1  Increase intensity or length of bicycle rides  #1 4/5   #2  Eliminate sugary drinks; watch portion sizes #2 5/5   #3  Include vegetables, fruit, whole grains daily #3 5/5     New Plan: pay attention to portions       Patient Active Problem List   Diagnosis    Hyperlipidemia    Colorectal polyps    Migraine headache    Seizure disorder ([facility] [address]    Venous insufficiency    Post-surgical hypothyroidism    Allergic rhinitis    Leukopenia    Cervical spondylosis    Spinal stenosis, lumbar    Shoulder pain, left    GERD (gastroesophageal reflux disease)    Paresthesias    Trigger ring finger    Hammer toe of right foot    Cataracts, bilateral    Obesity due to excess calories with serious comorbidity    Essential hypertension    CMC arthritis    Hyperpigmentation    Nonrheumatic tricuspid valve regurgitation       Current Outpatient Medications   Medication Sig Dispense Refill    pantoprazole (PROTONIX) 40 MG tablet       losartan (COZAAR) 50 MG tablet TAKE 1 TABLET DAILY 90 tablet 3    triamterene-hydroCHLOROthiazide (MAXZIDE-25) 37.5-25 MG per tablet Take 0.5 tablets by mouth daily 45 tablet 1    SYNTHROID 88 MCG tablet TAKE 1 TABLET DAILY 90 tablet 3    triamcinolone (KENALOG) 0.1 % ointment Apply topically nightly 30 g 3    mometasone (NASONEX) 50 MCG/ACT nasal spray 2 sprays by Nasal route daily 3 Inhaler 1    Aspirin-Acetaminophen-Caffeine (EXCEDRIN PO) Take by mouth as needed      Cyanocobalamin (VITAMIN B 12 PO) Take 500 mcg by mouth daily.  levetiracetam (KEPPRA) 500 MG tablet Take 500 mg by mouth 2 times daily.  Ascorbic Acid (VITAMIN C PO) Take 500 mg by mouth daily       aspirin 81 MG EC tablet Take 81 mg by mouth daily.  Calcium Citrate-Vitamin D (CALCIUM CITRATE + D PO) Take 1 tablet by mouth 2 times daily (with meals). No current facility-administered medications for this visit.          NUTRITION RE-ASSESSMENT      Anthropometric Measurement Changes:      BMI:   BMI Readings from Last 3 Encounters:   04/29/22 35.40 kg/m²   10/21/21 35.08 kg/m²   05/06/21 36.03 kg/m²     Weight Change:Yes   Wt Readings from Last 3 Encounters:   04/29/22 226 lb (102.5 kg)   10/21/21 224 lb (101.6 kg)   05/06/21 223 lb 3.2 oz (101.2 kg)     Weight stable over past 3 years    Food and Nutrition Changes:   April 2019: controlling portions of meat; adding whole grains (granola bar)  Beverage consumption: drinking Gatorade Zero, diet soda, diet lemonade  Encouraged use of Hunger/Fullness monitoring to avoid over-eating      Update:   Up at 7:30  8a: yogurt  12:30p : chicken wings  And salad  Was eating cheerios or oatmeal - backed off due to daughter's concern about carbs  OR tuna on [employer] crackers (5 - 6)  S: apple or tangelo or nuts (almonds, walnut  6p: 1/2 [place], salad  OR baked fish, baked potato,     Physical Activity Changes:   Update: stationary bike every other day  PT 2 days/week  Walking every other evening - 30 minutes    Barriers:   None noted      Follow Up Plan: two weeks  Referring Provider: Layne Angelo MD     Time spent with patient: 40 minutes

## 2022-05-13 ENCOUNTER — OFFICE VISIT (OUTPATIENT)
Dept: INTERNAL MEDICINE CLINIC | Age: 77
End: 2022-05-13

## 2022-05-13 VITALS — WEIGHT: 224.6 LBS | BODY MASS INDEX: 35.18 KG/M2

## 2022-05-13 DIAGNOSIS — E78.00 PURE HYPERCHOLESTEROLEMIA: ICD-10-CM

## 2022-05-13 DIAGNOSIS — E66.01 CLASS 2 SEVERE OBESITY DUE TO EXCESS CALORIES WITH SERIOUS COMORBIDITY AND BODY MASS INDEX (BMI) OF 35.0 TO 35.9 IN ADULT (HCC): Primary | ICD-10-CM

## 2022-05-13 PROCEDURE — 99999 PR OFFICE/OUTPT VISIT,PROCEDURE ONLY: CPT | Performed by: DIETITIAN, REGISTERED

## 2022-05-13 NOTE — PATIENT INSTRUCTIONS
BEHAVIOR GOALS    What to eat: Plan meals to follow Plate Guide, with 1/2 plate vegetables, 1/4 plate protein, and 1/4 plate starch. How much to eat: Pay attention to hunger levels and eat just enough to be satisfied. Physical Activity: Continue exercise on all or most days of the week.

## 2022-05-27 ENCOUNTER — OFFICE VISIT (OUTPATIENT)
Dept: INTERNAL MEDICINE CLINIC | Age: 77
End: 2022-05-27

## 2022-05-27 VITALS — BODY MASS INDEX: 35.02 KG/M2 | WEIGHT: 223.6 LBS

## 2022-05-27 DIAGNOSIS — E66.01 CLASS 2 SEVERE OBESITY DUE TO EXCESS CALORIES WITH SERIOUS COMORBIDITY AND BODY MASS INDEX (BMI) OF 35.0 TO 35.9 IN ADULT (HCC): Primary | ICD-10-CM

## 2022-05-27 PROCEDURE — 99999 PR OFFICE/OUTPT VISIT,PROCEDURE ONLY: CPT | Performed by: DIETITIAN, REGISTERED

## 2022-05-27 NOTE — PROGRESS NOTES
Medical Nutrition Therapy Follow Up  Patient Care Team:  Reineir Szymanski MD as PCP - General  Reinier Szymanski MD as PCP - [facility]  Gisela Beltre MD as Consulting Physician (Gastroenterology)  Anthony Jimenes MD as Consulting Physician (Physical Medicine and Rehab)  Amita Schroeder MD as Consulting Physician (Neurology)  Miles Claude, MD as Consulting Physician (Orthopedic Surgery)  Ida Palafox MD as Consulting Physician (Neurosurgery)  Kandace Kim MD as Consulting Physician (Gastroenterology)  Brendan Haskins DO as Consulting Physician (Internal Medicine)  Nataly Tellez MD as Obstetrician (Obstetrics & Gynecology)  Adalberto Montalvo RD, LD as Dietitian (Dietitian Registered)    Reason for visit: 3 year follow up for weight management  Patient self-assessment of progress: \"I feel good, and I\"m losing inches\"     \"I stayed at home for two years during the pandemic and got off track with eating and exercise\"        ASSESSMENT/PLAN:   NUTRITION DIAGNOSIS    #1 Problem: Overweight/Obesity (NC-3.3)  Related to: Excessive energy intake or physical inactivity  As Evidenced by: BMI more than normative standard for age and sex (BMI=35.40)         NUTRITION INTERVENTION  Nutrition Prescription: Follow Plate Guide to control poritons    NUTRITION MONITORING AND EVALUATION  Indicator/Goal Criteria   #1  Increase intensity or length of bicycle rides  #1 4/5   #2  Monitor portion sizes #2 4/5   #3  Include vegetables, fruit, whole grains daily #3 5/5     New Plan: Plan ahead for social events where food is served.        Patient Active Problem List   Diagnosis    Hyperlipidemia    Colorectal polyps    Migraine headache    Seizure disorder (HCC)    Venous insufficiency    Post-surgical hypothyroidism    Allergic rhinitis    Leukopenia    Cervical spondylosis    Spinal stenosis, lumbar    Shoulder pain, left    GERD (gastroesophageal reflux disease)    Paresthesias    Trigger ring finger    Hammer toe of right foot    Cataracts, bilateral    Obesity due to excess calories with serious comorbidity    Essential hypertension    CMC arthritis    Hyperpigmentation    Nonrheumatic tricuspid valve regurgitation       Current Outpatient Medications   Medication Sig Dispense Refill    pantoprazole (PROTONIX) 40 MG tablet       losartan (COZAAR) 50 MG tablet TAKE 1 TABLET DAILY 90 tablet 3    triamterene-hydroCHLOROthiazide (MAXZIDE-25) 37.5-25 MG per tablet Take 0.5 tablets by mouth daily 45 tablet 1    SYNTHROID 88 MCG tablet TAKE 1 TABLET DAILY 90 tablet 3    triamcinolone (KENALOG) 0.1 % ointment Apply topically nightly 30 g 3    mometasone (NASONEX) 50 MCG/ACT nasal spray 2 sprays by Nasal route daily 3 Inhaler 1    Aspirin-Acetaminophen-Caffeine (EXCEDRIN PO) Take by mouth as needed      Cyanocobalamin (VITAMIN B 12 PO) Take 500 mcg by mouth daily.  levetiracetam (KEPPRA) 500 MG tablet Take 500 mg by mouth 2 times daily.  Ascorbic Acid (VITAMIN C PO) Take 500 mg by mouth daily       aspirin 81 MG EC tablet Take 81 mg by mouth daily.  Calcium Citrate-Vitamin D (CALCIUM CITRATE + D PO) Take 1 tablet by mouth 2 times daily (with meals). No current facility-administered medications for this visit.          NUTRITION RE-ASSESSMENT      Anthropometric Measurement Changes:    BMI:   BMI Readings from Last 3 Encounters:   05/13/22 35.18 kg/m²   04/29/22 35.40 kg/m²   10/21/21 35.08 kg/m²     Weight:  Wt Readings from Last 3 Encounters:   05/13/22 224 lb 9.6 oz (101.9 kg)   04/29/22 226 lb (102.5 kg)   10/21/21 224 lb (101.6 kg)     F/u: 223 lb    Food and Nutrition Changes:   April 2019: controlling portions of meat; adding whole grains (granola bar)  Beverage consumption: drinking Gatorade Zero, diet soda, diet lemonade  Encouraged use of Hunger/Fullness monitoring to avoid over-eating      Update:   Up at 7:30  8a: yogurt  12:30p : chicken wings  And salad  Was eating cheerios or oatmeal - backed off due to daughter's concern about carbs  OR tuna on [employer] crackers (5 - 6)  S: apple or tangelo or nuts (almonds, walnut  6p: 1/2 [place], salad  OR baked fish, baked potato    2 week f/u: 1/4 cup nuts as snack, 3 meals daily  B: yogurt with granola  L: chicken salad  Or tuna saladwith whole grain bread or whole wheat crackers  D: baked pork chop, sweet potato, salad  Or homemade chili  S: yogurt, 1/4 cup nuts  No carry-out meals  Paying attention to satisfaction levels, instead of over-eating  Still eating david or small box of raisins - often as snack instead of cookie    Physical Activity Changes:   Update: stationary bike every other day  PT 2 days/week  Walking every other evening - 30 minutes    2 week f/u: first week - PT; this week not much due to 's illness, but did do yardwork    Barriers:   None noted      Follow Up Plan: two weeks  Referring Provider: Nivia Rivers MD     Time spent with patient:  minutes

## 2022-07-18 RX ORDER — LEVOTHYROXINE SODIUM 88 MCG
TABLET ORAL
Qty: 90 TABLET | Refills: 1 | Status: SHIPPED | OUTPATIENT
Start: 2022-07-18

## 2022-07-18 NOTE — TELEPHONE ENCOUNTER
Patient called to request Synthroid. [facility] pharmacy has been trying to get a hold of us to have this filled. Advised pt we just got request today and it has been forwarded to to dr. Contreras states she has been our of synthroid for a week and requesting 1 week supply to get her through.

## 2022-08-31 ENCOUNTER — OFFICE VISIT (OUTPATIENT)
Dept: ORTHOPEDIC SURGERY | Age: 77
End: 2022-08-31
Payer: MEDICARE

## 2022-08-31 ENCOUNTER — TELEPHONE (OUTPATIENT)
Dept: INTERNAL MEDICINE CLINIC | Age: 77
End: 2022-08-31

## 2022-08-31 VITALS — WEIGHT: 222 LBS | HEIGHT: 67 IN | BODY MASS INDEX: 34.84 KG/M2

## 2022-08-31 DIAGNOSIS — M16.11 ARTHRITIS OF RIGHT HIP: ICD-10-CM

## 2022-08-31 DIAGNOSIS — M79.604 RIGHT LEG PAIN: Primary | ICD-10-CM

## 2022-08-31 PROCEDURE — 1123F ACP DISCUSS/DSCN MKR DOCD: CPT | Performed by: PHYSICIAN ASSISTANT

## 2022-08-31 PROCEDURE — 99203 OFFICE O/P NEW LOW 30 MIN: CPT | Performed by: PHYSICIAN ASSISTANT

## 2022-08-31 PROCEDURE — G8417 CALC BMI ABV UP PARAM F/U: HCPCS | Performed by: PHYSICIAN ASSISTANT

## 2022-08-31 PROCEDURE — G8427 DOCREV CUR MEDS BY ELIG CLIN: HCPCS | Performed by: PHYSICIAN ASSISTANT

## 2022-08-31 PROCEDURE — 1090F PRES/ABSN URINE INCON ASSESS: CPT | Performed by: PHYSICIAN ASSISTANT

## 2022-08-31 PROCEDURE — G8399 PT W/DXA RESULTS DOCUMENT: HCPCS | Performed by: PHYSICIAN ASSISTANT

## 2022-08-31 PROCEDURE — 1036F TOBACCO NON-USER: CPT | Performed by: PHYSICIAN ASSISTANT

## 2022-08-31 RX ORDER — TIZANIDINE 4 MG/1
4 TABLET ORAL 4 TIMES DAILY PRN
Qty: 60 TABLET | Refills: 0 | Status: SHIPPED | OUTPATIENT
Start: 2022-08-31

## 2022-08-31 RX ORDER — METHYLPREDNISOLONE 4 MG/1
TABLET ORAL
Qty: 1 KIT | Refills: 0 | Status: SHIPPED | OUTPATIENT
Start: 2022-08-31

## 2022-08-31 NOTE — TELEPHONE ENCOUNTER
After speaking to MD, it was recommended that the pt goes to the [facility] in  ( [facility]) at 5pm to be seen for her leg pain     The pt verbalized understanding     Routed back to MD for her to informed

## 2022-08-31 NOTE — TELEPHONE ENCOUNTER
Patient is calling about a shooting pain in her right thigh. She states the pain feels like it is in her veins. This pain started on 08/21 when she had a charley horse and she has noticed a sharp pain in her leg ever since. She states it is has gotten worse and that it seems worse when she is moving around, but she still feels it when she is sitting. Patient would like to know what she should do for this pain. She states she is unsure if this is something Dr. Kera Childs can help her with or if she should see an Orthopedic doctor. Patient would like a call back to further discuss.

## 2022-08-31 NOTE — PROGRESS NOTES
Subjective:      Patient ID: Caitlin Monsivais is a 76 y.o. female who is here in the [address] for an initial evaluation of right groin and medial thigh pain which has been present about for about 10 days ago. She does not recall a specific injury. She describes the pain as a \"charley horse\"  She states symptoms were waxing and waning over the past 10 days. Pain became more intense or severe today when she went to the bank. She was attempting to get out of her car when she developed significant spasms within her inner thigh. She denies any history of injury. She denies any low back pain presently. She denies any numbness or tingling into the lower extremity, fevers or chills. She does have a history of lumbar spinal stenosis as well as a previous right knee arthroplasty performed several years ago by Dr. Soila Jones. Pain Scale 8/10 VAS. Location of pain right medial thigh and right groin. Pain is worse with movement such as raising her leg or donning shoes and socks. Pain improves with rest.   Previous treatments have included Tylenol without relief. She did contact her primary care physician, Dr. Kishore Awad who referred her to the after-hours clinic this evening. Review of Systems:  I have reviewed the clinically relevant past medical history, medications, allergies, family history, social history, and 13 point Review of Systems from the patient's recent history form & documented any details relevant to today's presenting complaints in the history above. The patient's self-reported past medical history, medications, allergies, family history, social history, and Review of Systems form from today's date have been scanned into the chart under the \"Media\" tab. As outlined in the HPI. Negative for poly-joint pain, swelling and stiffness. Negative numbness or tingling into the affected extremity.      Past Medical History:   Diagnosis Date Allergic rhinitis     Bell's palsy 2005    Left    Cervical spondylosis     C5-7    Closed fracture of fifth metatarsal bone 2011    Left- displaced    Colorectal polyps     Essential hypertension     GERD (gastroesophageal reflux disease)      EGD - mild gastritis    Goiter     Multinodular- s/p thyroidectomy     Hyperlipidemia     Irregular uterine bleeding     Meningiomas, multiple (HCC)     Menorrhagia     Migraine headache     Osteoarthritis     Post-surgical hypothyroidism     Seizure disorder (HCC)     Secondary to meningioma    Spinal stenosis, lumbar     L3-4, L4-5: moderately severe    Tricuspid regurgitation     Trigger finger     Right    Vaginal prolapse     Venous insufficiency        Family History   Problem Relation Age of Onset    Asthma Mother     Heart Failure Mother          61    Prostate Cancer Father          70    Diabetes Maternal Grandmother         Type II    Glaucoma Maternal Grandmother     Rheum Arthritis Neg Hx     Osteoarthritis Neg Hx     Breast Cancer Neg Hx     Cancer Neg Hx     High Cholesterol Neg Hx     Hypertension Neg Hx     Migraines Neg Hx     Ovarian Cancer Neg Hx     Rashes/Skin Problems Neg Hx     Seizures Neg Hx     Stroke Neg Hx     Thyroid Disease Neg Hx        Past Surgical History:   Procedure Laterality Date    ARTHROPLASTY Left     4th toe    BLEPHAROPLASTY Bilateral     lower eyelid    BRAIN MENINGIOMA EXCISION  ,     CARPAL TUNNEL RELEASE Bilateral , ,     CHOLECYSTECTOMY  2002    EYE SURGERY Left 11/10/2020    REVISION LEFT ENDOSCOPIC DACRYOCYSTORHINOSTOMY/ DACRYOCYSTORHINOSTOMY WITH STENT PLACEMENT, LEFT SIDE     FINGER TRIGGER RELEASE Right     FINGER TRIGGER RELEASE Right 13    FOOT SURGERY      with screws placed     FOOT SURGERY Right 2018    OSTEOTOMY 1ST METATARSAL WITH BUNIONECTOMY    HAMMER TOE SURGERY Right 13    HYSTERECTOMY (CERVIX STATUS UNKNOWN)      still with ovaries HYSTERECTOMY, TOTAL ABDOMINAL (CERVIX REMOVED)  1983    Secondary to DUB    SHOULDER ARTHROSCOPY Left 9/24/13    THYROIDECTOMY  7/04    TOTAL KNEE ARTHROPLASTY Right 5/12       Social History     Occupational History    Occupation: Homemaker   Tobacco Use    Smoking status: Never    Smokeless tobacco: Never   Vaping Use    Vaping Use: Never used   Substance and Sexual Activity    Alcohol use: Yes     Comment: 1-2 glasses of wine per month    Drug use: No    Sexual activity: Yes     Partners: Male       Current Outpatient Medications   Medication Sig Dispense Refill    methylPREDNISolone (MEDROL, FRANCOISE,) 4 MG tablet Take by mouth. 6 po day one 5 po day 2 4 po day 3 3 po day 4 2 po day 5 1 po day 6. 1 kit 0    tiZANidine (ZANAFLEX) 4 MG tablet Take 1 tablet by mouth 4 times daily as needed (spasm) 60 tablet 0    SYNTHROID 88 MCG tablet TAKE 1 TABLET DAILY 90 tablet 1    pantoprazole (PROTONIX) 40 MG tablet       losartan (COZAAR) 50 MG tablet TAKE 1 TABLET DAILY 90 tablet 3    triamterene-hydroCHLOROthiazide (MAXZIDE-25) 37.5-25 MG per tablet Take 0.5 tablets by mouth daily 45 tablet 1    triamcinolone (KENALOG) 0.1 % ointment Apply topically nightly 30 g 3    mometasone (NASONEX) 50 MCG/ACT nasal spray 2 sprays by Nasal route daily 3 Inhaler 1    Aspirin-Acetaminophen-Caffeine (EXCEDRIN PO) Take by mouth as needed      Cyanocobalamin (VITAMIN B 12 PO) Take 500 mcg by mouth daily. levetiracetam (KEPPRA) 500 MG tablet Take 500 mg by mouth 2 times daily. Ascorbic Acid (VITAMIN C PO) Take 500 mg by mouth daily       aspirin 81 MG EC tablet Take 81 mg by mouth daily. Calcium Citrate-Vitamin D (CALCIUM CITRATE + D PO) Take 1 tablet by mouth 2 times daily (with meals). No current facility-administered medications for this visit.        Allergies   Allergen Reactions    Latex Anaphylaxis    Sulfa Antibiotics      \"GAVE ME A TERRIBLE YEAST INFECTION\"         Objective:     She is alert, oriented x 3, pleasant, well nourished, developed and in no acute distress. Ht 5' 7\" (1.702 m)   Wt 222 lb (100.7 kg)   BMI 34.77 kg/m²        Examination of the right hip shows:  No discoloration, open wounds or gross deformity. She has a well-healed scar over the anterior proximal thigh where tissue was harvested for previous neurosurgical procedure. Greater trochanter/bursa palpation non-tender. Anterior superior iliac spine is non-tender. Ischial tuberosity is non-tender. Sacroiliac joint is non-tender. She is tender over the course of the hip abductors. Pain is reproduced with active right hip abduction against resistance as well as passive stretching of the right hip abductors. ROM:  -Flexion 100                      (Nml 120-135 degrees)  -Extension 10                  (Nml 20-30 degrees)  -Abduction 40                  (Nml 40-50 degrees)  -Internal rotation 20         (Nml 30 degrees)  -External rotation 40        (Nml 50 degrees)    [facility] resisted hip flexion test positive. FADIR negative. Luis's negative. Souleymane's negative. Log Roll positive. Gait is mildly antalgic. Lower extremities:  5/5 motor strength of bilateral lower extremities. Negative straight leg raise, bilaterally. Deep tendon reflexes at knees and achilles are 2+. Sensation is intact to light touch L3 to S1 bilaterally. No clonus. Examination of the lower extremities shows intact perfusion to both lower extremities. No cyanosis and digigts are warm to touch, capillary refill is less than 2 seconds. There is no edema noted. Intact skin without lacerations or abrasions, no significant erythema, rashes or skin lesions. X Rays: were performed in the office today:   AP Pelvis, AP and Frog Leg Lateral  Right Hip:    The alignment is anatomic. There are radiographic findings to suggest mild degenerative changes of the bilateral hips.    There are no radiographic findings to suggest an acute displaced fracture or dislocation. Additional Tests reviewed: none  Additional Outside Records reviewed: none    Diagnosis:       ICD-10-CM    1. Right leg pain  M79.604 XR HIP 2-3 VW W PELVIS RIGHT      2. Arthritis of right hip  M16.11            Assessment and Plan:       Assessment:  Right groin and anterior thigh pain onset 10 days ago. Worsened today when attempting to get out of the car. She does have mild arthritic changes of both left and right hip. She has pain with active right hip abduction as well as active straight leg raise and internal, external rotation of the right hip. Differential diagnosis would include right hip arthritis, right hip abductor strain, lumbar radicular pain and less likely DVT, infection. I had an extensive discussion with Ms. Rose Verma regarding the natural history, etiology, and long term consequences of her condition. I have presented reasonable alternatives to the patient's proposed care, treatment, and services. Risks and benefits of the treatment options also reviewed in detail. I have outlined a treatment plan with them. She has had full opportunity to ask her questions. I have answered them all to her satisfaction. I feel that Ms. Rose Verma understands our discussion today. Plan:  Medications-  Medrol Dosepak, 6 mg tablets. Tizanidine 4 mg every 6 hours as needed muscle spasm. She has a walker at home from previous right knee arthroplasty. I have instructed her to begin using walker until pain subsides. If she is concerned about DVT she can contact her primary care physician tomorrow for scheduling of an outpatient venous ultrasound. Unable to obtain a venous ultrasound this evening. Follow Edgar Sabillon MD in 1 week. Call or return to clinic if these symptoms worsen or fail to improve as anticipated.                                                       Rose Marie Lakhani PA-C   Senior Physician Assistant   [facility]                                         Disclaimer: This note was generated with use of a verbal recognition program (DRAGON) and an attempt was made to check for errors. It is possible that there are still dictated errors within this office note. If so, please bring any significant errors to my attention for an addendum. All efforts were made to ensure that this office note is accurate.

## 2022-09-02 RX ORDER — PANTOPRAZOLE SODIUM 40 MG/1
40 TABLET, DELAYED RELEASE ORAL DAILY
Qty: 90 TABLET | Refills: 1 | Status: SHIPPED | OUTPATIENT
Start: 2022-09-02

## 2022-11-11 ENCOUNTER — OFFICE VISIT (OUTPATIENT)
Dept: INTERNAL MEDICINE CLINIC | Age: 77
End: 2022-11-11
Payer: MEDICARE

## 2022-11-11 ENCOUNTER — TELEPHONE (OUTPATIENT)
Dept: INTERNAL MEDICINE CLINIC | Age: 77
End: 2022-11-11

## 2022-11-11 VITALS
SYSTOLIC BLOOD PRESSURE: 138 MMHG | DIASTOLIC BLOOD PRESSURE: 78 MMHG | HEIGHT: 67 IN | BODY MASS INDEX: 35.41 KG/M2 | RESPIRATION RATE: 98 BRPM | WEIGHT: 225.6 LBS | HEART RATE: 65 BPM

## 2022-11-11 DIAGNOSIS — I10 ESSENTIAL HYPERTENSION: ICD-10-CM

## 2022-11-11 DIAGNOSIS — Z00.00 MEDICARE ANNUAL WELLNESS VISIT, SUBSEQUENT: Primary | ICD-10-CM

## 2022-11-11 DIAGNOSIS — E66.01 CLASS 2 SEVERE OBESITY DUE TO EXCESS CALORIES WITH SERIOUS COMORBIDITY AND BODY MASS INDEX (BMI) OF 35.0 TO 35.9 IN ADULT (HCC): ICD-10-CM

## 2022-11-11 DIAGNOSIS — Z91.81 AT HIGH RISK FOR FALLS: ICD-10-CM

## 2022-11-11 DIAGNOSIS — D70.2 OTHER DRUG-INDUCED NEUTROPENIA (HCC): Chronic | ICD-10-CM

## 2022-11-11 DIAGNOSIS — E89.0 POST-SURGICAL HYPOTHYROIDISM: Chronic | ICD-10-CM

## 2022-11-11 PROCEDURE — G8417 CALC BMI ABV UP PARAM F/U: HCPCS | Performed by: INTERNAL MEDICINE

## 2022-11-11 PROCEDURE — G8484 FLU IMMUNIZE NO ADMIN: HCPCS | Performed by: INTERNAL MEDICINE

## 2022-11-11 PROCEDURE — 1036F TOBACCO NON-USER: CPT | Performed by: INTERNAL MEDICINE

## 2022-11-11 PROCEDURE — 3078F DIAST BP <80 MM HG: CPT | Performed by: INTERNAL MEDICINE

## 2022-11-11 PROCEDURE — G0439 PPPS, SUBSEQ VISIT: HCPCS | Performed by: INTERNAL MEDICINE

## 2022-11-11 PROCEDURE — 1090F PRES/ABSN URINE INCON ASSESS: CPT | Performed by: INTERNAL MEDICINE

## 2022-11-11 PROCEDURE — G0447 BEHAVIOR COUNSEL OBESITY 15M: HCPCS | Performed by: INTERNAL MEDICINE

## 2022-11-11 PROCEDURE — 3074F SYST BP LT 130 MM HG: CPT | Performed by: INTERNAL MEDICINE

## 2022-11-11 PROCEDURE — 99213 OFFICE O/P EST LOW 20 MIN: CPT | Performed by: INTERNAL MEDICINE

## 2022-11-11 PROCEDURE — G8427 DOCREV CUR MEDS BY ELIG CLIN: HCPCS | Performed by: INTERNAL MEDICINE

## 2022-11-11 PROCEDURE — 1123F ACP DISCUSS/DSCN MKR DOCD: CPT | Performed by: INTERNAL MEDICINE

## 2022-11-11 PROCEDURE — G8399 PT W/DXA RESULTS DOCUMENT: HCPCS | Performed by: INTERNAL MEDICINE

## 2022-11-11 SDOH — ECONOMIC STABILITY: FOOD INSECURITY: WITHIN THE PAST 12 MONTHS, THE FOOD YOU BOUGHT JUST DIDN'T LAST AND YOU DIDN'T HAVE MONEY TO GET MORE.: NEVER TRUE

## 2022-11-11 SDOH — ECONOMIC STABILITY: FOOD INSECURITY: WITHIN THE PAST 12 MONTHS, YOU WORRIED THAT YOUR FOOD WOULD RUN OUT BEFORE YOU GOT MONEY TO BUY MORE.: NEVER TRUE

## 2022-11-11 ASSESSMENT — PATIENT HEALTH QUESTIONNAIRE - PHQ9
SUM OF ALL RESPONSES TO PHQ QUESTIONS 1-9: 0
1. LITTLE INTEREST OR PLEASURE IN DOING THINGS: 0
SUM OF ALL RESPONSES TO PHQ9 QUESTIONS 1 & 2: 0
2. FEELING DOWN, DEPRESSED OR HOPELESS: 0
SUM OF ALL RESPONSES TO PHQ QUESTIONS 1-9: 0

## 2022-11-11 ASSESSMENT — SOCIAL DETERMINANTS OF HEALTH (SDOH): HOW HARD IS IT FOR YOU TO PAY FOR THE VERY BASICS LIKE FOOD, HOUSING, MEDICAL CARE, AND HEATING?: NOT HARD AT ALL

## 2022-11-11 NOTE — ASSESSMENT & PLAN NOTE
Home BP running 122//70- she will continue to monitor and call if consistently > 140/90. No CP, SOB, visual changes, dizziness, or palpitations, but starting having frontal HA's about 3 weeks ago. Positive nasal congestion, but no purulent nasal discharge or sputum. No other neurologic symptoms. Not taking nasal steroid consistently, so recommended doing this. If no improvement in HAs within 2 weeks, she will call her neurologist for urgent appointment given meningioma history. She will call if purulent nasal discharge or sputum develops, or if condition otherwise worsens.

## 2022-11-11 NOTE — ASSESSMENT & PLAN NOTE
No progress due to comfort eating related to recent external stressors. Patient was asked about her current diet and exercise habits, and personalized advice was provided regarding recommended lifestyle changes. Patient's comorbid health conditions associated with elevated BMI were discussed, as well as the likely benefits of weight loss. Based upon patient's motivation to change her behavior, the following plan was agreed upon to work toward a weight loss goal of 35-40 pounds: continue exercise for at least 30 minutes 4-5 days per week and lower carb diet. Educational materials for  weight loss were provided. Has seen dietitian, but will refer to [facility] for assistance with adherence to regimen. Patient will follow-up in 6 month(s) with PCP. Provider spent 15 minutes counseling patient.

## 2022-11-11 NOTE — PATIENT INSTRUCTIONS
Learning About Healthy Weight  What is a healthy weight? A healthy weight is the weight at which you feel good about yourself and have energy for work and play. It's also one that lowers your risk for health problems. What can you do to stay at a healthy weight? It can be hard to stay at a healthy weight, especially when fast food, vending-machine snacks, and processed foods are so easy to find. And with your busy lifestyle, activity may be low on your list of things to do. But staying at a healthy weight may be easier than you think. Here are some dos and don'ts for staying at a healthy weight. Do eat healthy foods  The kinds of foods you eat have a big impact on both your weight and your health. Reaching and staying at a healthy weight is not about going on a diet. It's about making healthier food choices every day and changing your diet for good. Healthy eating means eating a variety of foods so that you get all the nutrients you need. Your body needs protein, carbohydrate, and fats for energy. They keep your heart beating, your brain active, and your muscles working. On most days, try to eat from each food group. This means eating a variety of: Whole grains, such as whole wheat breads and pastas. Fruits and vegetables. Dairy products, such as low-fat milk, yogurt, and cheese. Lean proteins, such as all types of fish, chicken without the skin, and beans. Don't have too much or too little of one thing. All foods, if eaten in moderation, can be part of healthy eating. Even sweets can be okay. If your favorite foods are high in fat, salt, sugar, or calories, limit how often you eat them. Eat smaller servings, or look for healthy substitutes. Do watch what you eat  Many people eat more than their bodies need. Part of staying at a healthy weight means learning how much food you really need from day to day and not eating more than that.  Even with healthy foods, eating too much can make you gain weight. Having a well-balanced diet means that you eat enough, but not too much, and that your food gives you the nutrients you need to stay healthy. So listen to your body. Eat when you're hungry. Stop when you feel satisfied. It's a good idea to have healthy snacks ready for when you get hungry. Keep healthy snacks with you at work, in your car, and at home. If you have a healthy snack easily available, you'll be less likely to pick a candy bar or bag of chips from a vending machine instead. Some healthy snacks you might want to keep on hand are fruit, low-fat yogurt, string cheese, low-fat microwave popcorn, raisins and other dried fruit, nuts, whole wheat crackers, pretzels, carrots, celery sticks, and broccoli. Do some physical activity  A big part of reaching and staying at a healthy weight is being active. When you're active, you burn calories. This makes it easier to reach and stay at a healthy weight. When you're active on a regular basis, your body burns more calories, even when you're at rest. Being active helps you lose fat and build lean muscle. Try to be active for at least 1 hour every day. This may sound like a lot, but it's okay to be active in smaller blocks of time that add up to 1 hour a day. Any activity that makes your heart beat faster and keeps it there for a while counts. A brisk walk, run, or swim will get your heart beating faster. So will climbing stairs, shooting baskets, or cycling. Even some household chores like vacuuming and mowing the lawn will get your heart rate up. Pick activities that you enjoy--ones that make your heart beat faster, your muscles stronger, and your muscles and joints more flexible. If you find more than one thing you like doing, do them all. You don't have to do the same thing every day. Don't diet  Diets don't work. Diets are temporary. Because you give up so much when you diet, you may be hungry and think about food all the time.  And after you stop dieting, you also may overeat to make up for what you missed. Most people who diet end up gaining back the pounds they lost--and more. Remember that healthy bodies come in lots of shapes and sizes. Everyone can get healthier by eating better and being more active. Where can you learn more? Go to https://chpepiceweb.healthPrintio.ru. org and sign in to your [employer] account. Enter 022 [id number] in the [employer] box to learn more about \"Learning About Healthy Weight. \"     If you do not have an account, please click on the \"Sign Up Now\" link. Current as of: December 27, 2021               Content Version: 13.4  © 5404-5417 Healthwise, [employer] information technology. Care instructions adapted under license by [facility] ([facility]). If you have questions about a medical condition or this instruction, always ask your healthcare professional. [place] [address] any warranty or liability for your use of this information. Personalized Preventive Plan for Cristobal Fernandez - 11/11/2022  Medicare offers a range of preventive health benefits. Some of the tests and screenings are paid in full while other may be subject to a deductible, co-insurance, and/or copay. Some of these benefits include a comprehensive review of your medical history including lifestyle, illnesses that may run in your family, and various assessments and screenings as appropriate. After reviewing your medical record and screening and assessments performed today your provider may have ordered immunizations, labs, imaging, and/or referrals for you. A list of these orders (if applicable) as well as your Preventive Care list are included within your After Visit Summary for your review. Other Preventive Recommendations:    A preventive eye exam performed by an eye specialist is recommended every 1-2 years to screen for glaucoma; cataracts, macular degeneration, and other eye disorders.   A preventive dental visit is recommended every 6 months. Try to get at least 150 minutes of exercise per week or 10,000 steps per day on a pedometer . Order or download the FREE \"Exercise & Physical Activity: Your Everyday Guide\" from [employer]. Call 9-156.636.6191 or search [employer] online. You need 2719-4204 mg of calcium and 2267-1249 IU of vitamin D per day. It is possible to meet your calcium requirement with diet alone, but a vitamin D supplement is usually necessary to meet this goal.  When exposed to the sun, use a sunscreen that protects against both UVA and UVB radiation with an SPF of 30 or greater. Reapply every 2 to 3 hours or after sweating, drying off with a towel, or swimming. Always wear a seat belt when traveling in a car. Always wear a helmet when riding a bicycle or motorcycle.

## 2022-11-11 NOTE — PROGRESS NOTES
Medicare Annual Wellness Visit    Candelaria Mckeon is here for Medicare AWV (Has eye exam on 11/22/2022 ) and Headache (on and off x 3 weeks )    Assessment & Plan   Medicare annual wellness visit, subsequent  COVID boosters up to date  Essential hypertension  Assessment & Plan:  Home BP running 122//70- she will continue to monitor and call if consistently > 140/90. No CP, SOB, visual changes, dizziness, or palpitations, but starting having frontal HA's about 3 weeks ago. Positive nasal congestion, but no purulent nasal discharge or sputum. No other neurologic symptoms. Not taking nasal steroid consistently, so recommended doing this. If no improvement in HAs within 2 weeks, she will call her neurologist for urgent appointment given meningioma history. She will call if purulent nasal discharge or sputum develops, or if condition otherwise worsens. Orders:  -     Comprehensive Metabolic Panel, Fasting; Future  -     MI OFFICE OUTPATIENT VISIT 15 MINUTES [81149]  Post-surgical hypothyroidism  Assessment & Plan:  Clinically euthyroid, but will adjust levothyroxine dose if indicated by lab results. Orders:  -     TSH; Future  -     MI OFFICE OUTPATIENT VISIT 15 MINUTES [62028]  Other drug-induced neutropenia ([facility] [address]  Assessment & Plan:  Due to long-term effects of dilantin on bone marrow. Asymptomatic, stable. Orders:  -     CBC with Auto Differential; Future  -     MI OFFICE OUTPATIENT VISIT 15 MINUTES [17620]  Class 2 severe obesity due to excess calories with serious comorbidity and body mass index (BMI) of 35.0 to 35.9 in adult [facility])  Assessment & Plan:  No progress due to comfort eating related to recent external stressors. Patient was asked about her current diet and exercise habits, and personalized advice was provided regarding recommended lifestyle changes. Patient's comorbid health conditions associated with elevated BMI were discussed, as well as the likely benefits of weight loss. Based upon patient's motivation to change her behavior, the following plan was agreed upon to work toward a weight loss goal of 35-40 pounds: continue exercise for at least 30 minutes 4-5 days per week and lower carb diet. Educational materials for  weight loss were provided. Has seen dietitian, but will refer to [facility] [place] for assistance with adherence to regimen. Patient will follow-up in 6 month(s) with PCP. Provider spent 15 minutes counseling patient. BMI 35.0-35.9,adult  -     [place] Behavior  obesity 15m []  At high risk for falls  -     [facility]    Recommendations for Preventive Services Due: see orders and patient instructions/AVS.  Recommended screening schedule for the next 5-10 years is provided to the patient in written form: see Patient Instructions/AVS.     Return in about 6 months (around 5/11/2023) for 30 MIN F/U. Subjective       Patient's complete Health Risk Assessment and screening values have been reviewed and are found in Flowsheets. The following problems were reviewed today and where indicated follow up appointments were made and/or referrals ordered.     Positive Risk Factor Screenings with Interventions:    Fall Risk:  Do you feel unsteady or are you worried about falling? : (!) yes  2 or more falls in past year?: no  Fall with injury in past year?: no   Fall Risk Interventions:    Physical therapy referral ordered for strength and balance training             General Health and ACP:  General  In general, how would you say your health is?: Fair  In the past 7 days, have you experienced any of the following: New or Increased Pain, New or Increased Fatigue, Loneliness, Social Isolation, Stress or Anger?: (!) Yes  Select all that apply: (!) Stress  Do you get the social and emotional support that you need?: Yes  Do you have a Living Will?: Yes    Advance Directives       Power of  Living Will ACP-Advance Directive ACP-Power of Ana Cristina Not on File Filed on 06/13/13 Filed Not on File        General Health Risk Interventions:  Stress: Patient will schedule with [facility] to address external stressors, including recent loss of brother    Health Habits/Nutrition:  Physical Activity: Insufficiently Active    Days of Exercise per Week: 2 days    Minutes of Exercise per Session: 40 min     Have you lost any weight without trying in the past 3 months?: No  Body mass index: (!) 35.86  Have you seen the dentist within the past year?: Yes  Health Habits/Nutrition Interventions:  See A/P             Objective   Vitals:    11/11/22 1059 11/11/22 1205   BP: (!) 142/80 138/78   Pulse: 65    Resp: (!) 98    Weight: 225 lb 9.6 oz (102.3 kg)    Height: 5' 6.5\" (1.689 m)       Body mass index is 35.87 kg/m². Physical Exam  Constitutional:       General: She is not in acute distress. Appearance: She is well-developed. Eyes:      Conjunctiva/sclera: Conjunctivae normal.   Neck:      Thyroid: No thyroid mass or thyromegaly. Vascular: No carotid bruit. Cardiovascular:      Rate and Rhythm: Normal rate and regular rhythm. Pulses: Normal pulses. Heart sounds: Murmur heard. Systolic murmur is present with a grade of 2/6. No friction rub. No gallop. Pulmonary:      Effort: Pulmonary effort is normal. No respiratory distress. Breath sounds: Normal breath sounds. No wheezing, rhonchi or rales. Abdominal:      General: There is no distension. Palpations: Abdomen is soft. Tenderness: There is no abdominal tenderness. Musculoskeletal:      Cervical back: Neck supple. Right lower leg: Edema (trace) present. Left lower leg: Edema (trace) present. Lymphadenopathy:      Cervical: Cervical adenopathy (Slightly enlarged, NT LN left submandibular area- unchanged) present. Skin:     General: Skin is warm and dry. Neurological:      Mental Status: She is alert and oriented to person, place, and time.       Cranial Nerves: Cranial nerves 2-12 are intact. Psychiatric:         Speech: Speech normal.         Behavior: Behavior normal.         Thought Content: Thought content normal.         Judgment: Judgment normal.          Allergies   Allergen Reactions    Latex Anaphylaxis    Sulfa Antibiotics      \"GAVE ME A TERRIBLE YEAST INFECTION\"     Prior to Visit Medications    Medication Sig Taking? Authorizing Provider   pantoprazole (PROTONIX) 40 MG tablet Take 1 tablet by mouth daily Yes Concha Biggs MD   SYNTHROID 88 MCG tablet TAKE 1 TABLET DAILY Yes Concha Biggs MD   losartan (COZAAR) 50 MG tablet TAKE 1 TABLET DAILY Yes Concha Biggs MD   triamterene-hydroCHLOROthiazide (MAXZIDE-25) 37.5-25 MG per tablet Take 0.5 tablets by mouth daily Yes Concha Biggs MD   Aspirin-Acetaminophen-Caffeine (EXCEDRIN PO) Take by mouth as needed Yes Historical Provider, MD   Cyanocobalamin (VITAMIN B 12 PO) Take 500 mcg by mouth daily. Yes Historical Provider, MD   levETIRAcetam (KEPPRA) 500 MG tablet Take 500 mg by mouth 2 times daily. Yes Historical Provider, MD   Ascorbic Acid (VITAMIN C PO) Take 500 mg by mouth daily  Yes Historical Provider, MD   aspirin 81 MG EC tablet Take 81 mg by mouth daily. Yes Historical Provider, MD   Calcium Citrate-Vitamin D (CALCIUM CITRATE + D PO) Take 1 tablet by mouth 2 times daily (with meals). Yes Historical Provider, MD   methylPREDNISolone (MEDROL, FRANCOISE,) 4 MG tablet Take by mouth. 6 po day one 5 po day 2 4 po day 3 3 po day 4 2 po day 5 1 po day 6.   Patient not taking: Reported on 11/11/2022  CHELSEA Peters   tiZANidine (ZANAFLEX) 4 MG tablet Take 1 tablet by mouth 4 times daily as needed (spasm)  Patient not taking: Reported on 11/11/2022  CHELSEA Peters   triamcinolone (KENALOG) 0.1 % ointment Apply topically nightly  Patient not taking: Reported on 11/11/2022  Herlinda Jeans, MD   mometasone (NASONEX) 50 MCG/ACT nasal spray 2 sprays by Nasal route daily  Concha Biggs MD CareTeam (Including outside providers/suppliers regularly involved in providing care):   Patient Care Team:  Radhika Borrero MD as PCP - Henny Charles MD as PCP - [facility] Empaneled Provider  Jasmin Samuels MD as Consulting Physician (Gastroenterology)  Ayana Strickland MD as Consulting Physician (Physical Medicine and Rehab)  Hanh Andersen MD as Consulting Physician (Neurology)  Ilda Maxwell MD as Consulting Physician (Orthopedic Surgery)  Emily Coburn MD as Consulting Physician (Neurosurgery)  Rubin Sterling MD as Consulting Physician (Gastroenterology)  Magdalene Capps DO as Consulting Physician (Internal Medicine)  Ayse Gifford MD as Obstetrician (Obstetrics & Gynecology)  Trinh Martins RD, LD as Dietitian (Dietitian Registered)     Reviewed and updated this visit:  Tobacco  Allergies  Meds  Problems  Med Hx  Surg Hx  Soc Hx  Fam Hx

## 2022-11-11 NOTE — TELEPHONE ENCOUNTER
Patient meant to ask Dr. Tiarra Velazquez about the Pneumonia vaccine while she was in the office today. She is wanting to know if she needs it. Please  contact patient to advise.

## 2022-11-16 ENCOUNTER — HOSPITAL ENCOUNTER (OUTPATIENT)
Dept: PHYSICAL THERAPY | Age: 77
Setting detail: THERAPIES SERIES
Discharge: HOME | End: 2022-11-16
Payer: MEDICARE

## 2022-11-16 PROCEDURE — 97530 THERAPEUTIC ACTIVITIES: CPT

## 2022-11-16 PROCEDURE — 97161 PT EVAL LOW COMPLEX 20 MIN: CPT

## 2022-11-16 NOTE — PLAN OF CARE
[facility], [address] Linda Ledezma, [address]  Phone: [phone]   Fax: [phone]     Physical Therapy Certification    Dear Ivory Menjivar MD  ,    We had the pleasure of evaluating the following patient for physical therapy services at [address] [place] [address]. A summary of our findings can be found in the initial assessment below. This includes our plan of care. If you have any questions or concerns regarding these findings, please do not hesitate to contact me at the office phone number checked above. Thank you for the referral.       Physician Signature:_______________________________Date:__________________  By signing above (or electronic signature), therapists plan is approved by physician      Patient: Gaby Cormier   : 1945   MRN: [id number]  Referring Physician: Ivory Menjivar MD        Evaluation Date: 2022      Medical Diagnosis Information:  At high risk for falls [Z91.81]   PT diagnosis: Decreased B hip & L knee strength, Impaired balance and gait                                Insurance information: PT Insurance Information: Medicare. Secondary:     Plan of care sent to provider:      []Faxed   [x]Co-signature    (attempts: 1[x] 22 2[] 3[])          Precautions/ Contra-indications:   Adhesive allergy:  [x]NO      []YES   Latex Allergy:   []NO      [x]YES     Preferred Language for Healthcare:   [x]English       []other:    C-SSRS Triggered by Intake questionnaire (Past 2 wk assessment ):   [x] No, Questionnaire did not trigger screening.   [] Yes, Patient intake triggered C-SSRS Screening     [] Completed, no further action required.    [] Completed, PCP notified via Epic      Functional Scale:                                                                                                      Date assessed:  TO physical FS primary measure score = 46; risk adjusted = 50 11/16    Relevant Medical History:   Brain surgery in 2001, R TKA 2007, HTN, seizure disorder but is well controlled w/medication    SUBJECTIVE:     History of current complaint:   Patient reports steadily reducing balance, has become fearful shortly afterwards which has caused her to limit her activity. Feels that her legs are weaker as well. Has a cane and walking stick at home, uses cane over night but doesn't use either throughout the day. Denies recent falls, last fall was over a year ago but does report frequent LOB and decreased confidence with any ambulatory task, usually waits until her  gets home before she'll walk around. Has been having headaches recently, prior history of brain CA and is currently monitoring frequency and intensity, MD advised her to use inhaler more often due to changing of the seasons which pt feels has been helping.           Pain Scale: 0/10  Easing factors: -  Provocative factors: -   Type: []Constant   []Intermittent  []Radiating []Localized []other:  Numbness/Tingling: -    Baseline function/PLOF:  History of falls      []yes   [x]no   []unknown  Pt able to drive      [x]yes   []no   []unknown  Pt independent with ADLs   [x]yes   []no   []unknown  Pt required assistance from family for:      []bathing    []cooking    []cleaning    []dressing    []laundry    []unknown    Transfers:   IND  Ambulation:  IND    Occupation/School/Recreational activities: walking with , dogs      Social support/Living environment:      Family/caregiver support:   [x] yes: , doggie  []no  Equipment owned:  [x]SPC    []standard walker (SW)   []rolling walker (RW)  [x]rollator   []hemiwalker   [x]Walking stick   []loftstrands    []large based quad cane (LBQC)  []small based quad cane (SBQC)  []manual wc    [] electric wc    []lift chair     []hospital bed     []home o2    L      []reacher     []life alert     []none    []unknown  []other:   Home environment: []apartment    []1 story      [x]2 story     [] in LTC facility   [] split level home    [] mobile home    []senior apartment     [] unknown     Steps to enter first floor:    [] N/A    []no steps    [x]  threshold step   steps to enter   [x]no handrail    []single HR     []bilateral HR   []ramp    [] unknown     Steps to second floor:  [] N/A    []     steps    [x]full flight 12-13   []no handrail     [x] single HR     [] bilateral HR   [] unknown     Bathroom: []tub shower    [x] walk-in shower   [x]grab bars    []shower chair   []tub shower bench   []raised toilet seat w/arms  []raised toilet seat w/o arms   []BSC (3 in 1)   [x]Elevated commode           OBJECTIVE:     Cognitive Status:    A&O to  [x]x4    []person    []place    []time    []situation    []orientation not directly assessed    []not oriented   Able to follow:   []1 step commands    []1 step command inconsistently     []2 step commands       [x]multi-step commands     Vision:    []WNL    []wears glasses for reading    [x]wears glasses for sight    []Homonymous hemianopsia  []diplopia    []other:   Hearing:   [x]WNL    []wears hearing aides    []impaired     []tinnitus      Communication      [x]WNL    []slurred speech    []expressive aphasia    []receptive aphasia     []impaired:  Comments:       Functional Mobility    Bed Mobility:     Transfers:  Rolling to right side:   IND   Sit to stand:  IND, increased B ankle strategies to maintain balance noted  Rolling to left side:      Stand to sit:  IND, slight decreased eccentric control  Scooting in bed:      Stand pivot:    Supine to sit:   IND    Bed to chair:    Sit to supine:  IND      Gait Testing:     Gait   Level of Assistance needed:   IND   Assistive Device Used: none   Gait Deviations (firm surface/linoleum):    B Trendelenburg, narrow SOSA w/decreased B step length   Stairs  Level of Assistance needed:  MOD I    Pt negotiated up/down 4 stairs:   []WNL with 0-1 HR, reciprocal pattern, no suzan     []reciprocal    []step to pattern:       []no HR    [x]1 HR    [] B HR   Assistive Device Used:  none     Deficits noted:     Narrow SOSA, hesitancy descending     Balance:   Static Sitting:  [x]normal  []good  []good-  []fair+  []fair   []fair -   []poor  Dynamic Sitting: [x]normal  []good  []good-  []fair+  []fair   []fair -   []poor  Static Standing:  []normal  []good  []good-  [x]fair+  []fair   []fair -   []poor  Dynamic Standing: []normal  []good  []good-  []fair+  []fair   [x]fair -   []poor    Balance/Special Tests/Outcome Measures: Result N/A Comments   SLS      Feet Together      Romberg         Tandem Stance      QOLIBRI      TUG Score      10-meter Walk      30 sec Sit to Stand 8     Mini-BESTest      Dynamic Gait Index      Functional Gait Assessment 17/30     Tinetti Assessment       GROSSMAN Balance Assessment               Flexibility / ROM LEFT RIGHT Comments   Hamstrings WNL WFL    Piriformis Max impaired Min impaired    Quads Max impaired Max impaired                MMT      Hip flexion (L1-L2) 4+ 5    Hip abduction 3- 3+    Hip extension 3 3+    Knee extension (L2-L4) 4+ 5    Knee flexion 4 5    Ankle Dorsiflexion (L4-L5) 5 5    Ankle Plantar flexion (S1-S2)      Ankle Eversion (S1-S2)      Great Toe Ext (L5)            Core Strength             [x] Patient history, allergies, meds reviewed. Medical chart reviewed. See intake form. Review Of Systems (ROS):  [x]Performed Review of systems (Integumentary, CardioPulmonary, Neurological) by intake and observation. Intake form has been scanned into medical record. Patient has been instructed to contact their primary care physician regarding ROS issues if not already being addressed at this time.       Co-morbidities/Complexities (which will affect course of rehabilitation):    []None        []Hx of COVID   Arthritic conditions   []Rheumatoid arthritis (M05.9)  [x]Osteoarthritis (M19.91)  []Gout   Cardiovascular conditions   [x]Hypertension (I10)  [x]Hyperlipidemia (E78.5)  []Angina pectoris (I20)  []Atherosclerosis (I70)  []Pacemaker  []Hx of CABG/stent/  cardiac surgeries   Musculoskeletal conditions   []Disc pathology   []Congenital spine pathologies   []Osteoporosis (M81.8)  []Osteopenia (M85.8)  []Scoliosis       Endocrine conditions   []Hypothyroid (E03.9)  []Hyperthyroid Gastrointestinal conditions   []Constipation (X34.76)   Metabolic conditions   []Morbid obesity (E66.01)  []Diabetes type 1(E10.65) or 2 (E11.65)   []Neuropathy (G60.9)     Cardio/Pulmonary conditions   []Asthma (J45)  []Coughing   []COPD (J44.9)  []CHF  []A-fib   Psychological Disorders  []Anxiety (F41.9)  []Depression (F32.9)   []Other:   Developmental Disorders  []Autism (F84.0)  []CP (G80)  []Down Syndrome (Q90.9)  []Developmental delay     Neurological conditions  []Prior Stroke (I69.30)  []Parkinson's (G20)  []Encephalopathy (G93.40)  []MS (G35)  []Post-polio (G14)  []SCI  []TBI  []ALS Other conditions  []Fibromyalgia (M79.7)  []Vertigo  []Syncope  []Kidney Failure  []Cancer      []currently undergoing                treatment  []Pregnancy  []Incontinence   Prior surgeries  []involved limb  []previous spinal surgery  [] section birth  []hysterectomy  []bowel / bladder surgery  []other relevant surgeries   []Other:                Barriers to/and or personal factors that will affect rehab potential:              [x]Age  []Sex    []Smoker              [x]Motivation/Lack of Motivation                        []Co-Morbidities              []Cognitive Function, education/learning barriers              []Environmental, home barriers              []profession/work barriers  [x]past PT/medical experience  []other:    Falls Risk Assessment (30 days):   [] Falls Risk assessed and no intervention required. [x] Falls Risk assessed and Patient requires intervention due to being higher risk   []Tinetti Balance:    Total Score:        Balance:            Gait:         Disability Index: Risk of Falls:   []Low (> 24)   []Mod (19-23)   []High (< 18)    []Dynamic Gait Index       Risk of Falls: <19 = increased fall risk    [x]Functional Gait Assessment    FGA Score: 17      Indications: Non-specific older adults: <22/30 = risk of falls; Parkinson's patients <15-18/30 = risk of falls    []Kimble Balance Scale          []41-56 = independent     []21-40 = walking with assistance     []0-20 = wc bound    []Timed Up and Go (TUG)     seconds     []<12 = Independent    []<20 = Fall risk    []20-29  = High fall risk    []>30 = Severe fall risk  [x] Falls education provided, including: use of walking stick to improve balance      ASSESSMENT: Patient is a 77 yo female who complains of steadily worsening balance and decreasing LE strength. Patient denies falls but has had LOB, has access to cane, walking stick, and rollator but chooses not to use them. Patient presents with decreased B hip & LLE strength, impaired balance, and gait deviations that increase energy expenditure, further reducing balance. Pt does admit to decreased confidence while walking, unless she's holding onto something. Patient can benefit from PT services to address LE weakness, balance and gait deficits.           Functional Impairments:     []Noted scapular/hip hypomobility   []Noted scapular/hip hypermobility   []Assistance required with functional mobility/gait for safety   [x]Decreased core/proximal hip strength and neuromuscular control    [x]Decreased UE/LE functional strength    []Abnormal reflexes/sensation/myotomal/dermatomal deficits  []Hypertonic UE/LE   []Hypotonic UE/LE  []Dislocated//Hypermobile Glenohumeral joint  [x]Impaired balance/coordination/proprioceptive control    []other:      Functional Activity Limitations (from functional questionnaire and intake)   []Reduced ability to tolerate prolonged functional positions   [x]Reduced ability or difficulty with changes of positions or transfers between positions   []Reduced ability to maintain good posture and demonstrate good body mechanics with sitting, bending, and lifting   []Reduced ability to sleep   [] Reduced ability or tolerance with driving and/or computer work   [x]Reduced ability to perform lifting, reaching, carrying tasks   [x]Reduced ability to squat   []Reduced ability to forward bend   [x]Reduced ability to ambulate prolonged functional periods/distances/surfaces   [x]Reduced ability to ascend/descend stairs   []other:       Participation Restrictions   []Reduced participation in self care activities   [x]Reduced participation in home management activities   []Reduced participation in work activities   [x]Reduced participation in social activities. []Reduced participation in sport/recreational activities.     Classification:   [x]Signs/symptoms consistent with Primary prevention/risk reduction for loss of balance and falls    []Signs/symptoms consistent with Impaired neuromotor development   []Signs/symptoms consistent with Impaired motor function and sensory integrity associated w/non-progressive disorders of CNS - Congenital/Aquired in Infancy/Childhood or Acquired in Adolescence/Adulthood   []Signs/symptoms consistent with Impaired motor function and sensory integrity associate w/progressive disorders of the CNS     []Signs/symptoms consistent with Impaired motor function and sensory integrity associated w/acute or chronic polyneuropathies   []Signs/symptoms consistent with Impaired motor function, peripheral nerve integrity, and sendory integrity associated w/non-progressive disorders of spinal cord   []other:      Prognosis/Rehab Potential:   Tolerance of evaluation/treatment:    []Excellent     []Excellent   [x]Good     [x]Good   []Fair      []Fair   []Poor      []Poor      Physical Therapy Evaluation Complexity Justification  [x] A history of present problem with:  [] no personal factors and/or comorbidities that impact the plan of care;  [x]1-2 personal factors and/or comorbidities that impact the plan of care  []3 personal factors and/or comorbidities that impact the plan of care  [x] An examination of body systems using standardized tests and measures addressing any of the following: body structures and functions (impairments), activity limitations, and/or participation restrictions;:  [x] a total of 1-2 or more elements   [] a total of 3 or more elements   [] a total of 4 or more elements   [x] A clinical presentation with:  [x] stable and/or uncomplicated characteristics   [] evolving clinical presentation with changing characteristics  [] unstable and unpredictable characteristics;   [x] Clinical decision making of [x] low, [] moderate, [] high complexity using standardized patient assessment instrument and/or measurable assessment of functional outcome. [x] EVAL (LOW) 48207 (typically 15 minutes face-to-face)  [] EVAL (MOD) 59027 (typically 30 minutes face-to-face)  [] EVAL (HIGH) 10135 (typically 45 minutes face-to-face)  [] RE-EVAL     PLAN: PT to begin focusing on: Activation of scapular and hip musculature, Transfer & Gait safety, Evenly distributed weight-bearing through extremities, Pain Management    Frequency/Duration:  2 days per week for 6 Weeks:  Interventions:  [x]  Therapeutic exercise including: strength training, ROM, for LE, Glutes and core   [x]  NMR activation and proprioception for shoulder complex, glutes, UE/LE, and Core   [x]  Manual therapy as indicated for Shoulder & Hip complex, LE and core activation to include: STM, manual cues to facilitate/inhibit muscle groups. [x]  Modalities as needed that may include: thermal agents, E-stim, Biofeedback, US as indicated  [x]  Patient education on joint protection, postural re-education, home/activity modification, progression of HEP. HEP instruction: Written HEP instructions provided and reviewed.    Access Code: [email or web address]  URL: [email or web address]  Date: 11/16/2022  Prepared by: Aram Solis    Exercises  Seated Piriformis Stretch - 3 x daily - 7 x weekly - 3 sets - 30\" hold  Bridge - 2 x daily - 7 x weekly - 1 sets - 10 reps  Standing Hip Abduction with Anterior Support - 2 x daily - 7 x weekly - 1 sets - 10 reps  Sit to Stand with Armchair - 2 x daily - 7 x weekly - 1 sets - 10 reps      GOALS:  Patient stated goal: improve walking  [] Progressing: [] Met: [] Not Met: [] Adjusted    Therapist goals for Patient:   Short Term Goals: To be achieved in: 2 weeks  1. Independent in HEP and progression per patient tolerance, in order to prevent re-injury. [] Progressing: [] Met: [] Not Met: [] Adjusted  2. Patient will have a decrease in pain to facilitate improvement in movement, function, and ADLs as indicated by Functional Deficits. [] Progressing: [] Met: [] Not Met: [] Adjusted    Long Term Goals: To be achieved in: 6 weeks  1. Patient to demonstrate FGA score to 22/30  to demonstrate improved fall risk to assist with reaching prior level of function. [] Progressing: [] Met: [] Not Met: [] Adjusted  2. FOTO score of at least 52 to assist with reaching prior level of function. [] Progressing: [] Met: [] Not Met: [] Adjusted  3. Patient will demonstrate an increase in B hip abd & ext strength to 4-/5 in order to improve confidence with prolonged gait. [] Progressing: [] Met: [] Not Met: [] Adjusted  4. Patient to demonstrate increased reps to 11 for 30\" STS, with good eccentric control, in order to improve community access. [] Progressing: [] Met: [] Not Met: [] Adjusted  5. Patient to improve B quad length for active knee flexion to 95 deg while prone in order to improve balance immediately after sit to stand transfer.   [] Progressing: [] Met: [] Not Met: [] Adjusted     Electronically signed by:  Aram Solis, PT

## 2022-11-16 NOTE — FLOWSHEET NOTE
[address] [facility]  Phone: [phone]   Fax: [phone]    Physical Therapy Daily Treatment Note    Date:  2022     Patient Name:  Violeta Abdul    :  1945  MRN: [id number]  Medical Diagnosis: At high risk for falls [Z91.81]  Treatment Diagnosis: Decreased B hip & L knee strength, Impaired balance and gait     Insurance/Certification information:  PT Insurance Information: Medicare. Secondary:   Physician Information:  So Fonseca MD    Plan of care signed (Y/N): []  Yes [x]  No     Date of Patient follow up with Physician:      Progress Report: []  Yes  [x]  No     Date Range for reporting period:  Beginnin2022  Ending:       Progress report due (10 Rx/or 30 days whichever is less): visit #10 or  (date)     Recertification due (POC duration/ or 90 days whichever is less): visit #12 or 23 (date)     Visit # Insurance Allowable Auth required?  Date Range    mn []  Yes  [x]  No pcy       Latex Allergy:  [x]NO      []YES  Preferred Language for Healthcare:   [x]English       []Other:      Functional Scale:                                                                                                      Date assessed:  TO physical FS primary measure score = 46; risk adjusted = 50                        Functional Scale/Test Evaluation 2022 30 day  60 day  Discharge    Tinetti Assessment        30\" STS 8, decr ecc control       FGA 17/30            Pain level:  0/10  Location:  -    SUBJECTIVE:  See eval    OBJECTIVE: See eval      RESTRICTIONS/PRECAUTIONS: Brain surgery in , R TKA , HTN, seizure disorder but is well controlled w/medication    Interventions/Exercises:   Therapeutic Exercises (66780) Resistance / level Sets/sec Reps Notes   Seated stepper       TM -retro  -side stepping       Total gym       Mat table       Neuromuscular Re-ed (71179)        AirEx       Shuttle balance                                                 Therapeutic Activities (43052) /  Functional Tasks / Gait Training (64022)       Step ups       Sit to stand transitions                            Gait train w/walking stick                            Manual Intervention ([id number])       Quad stretch  Piriformis stretch                                              Modalities:     Pt. Education:  11/16/2022 patient educated on diagnosis, prognosis and expectations for rehab, all patient questions were answered    HEP instruction:  Access Code: [email or web address]  URL: [email or web address]  Date: 11/16/2022  Prepared by: Delroy Soto     Exercises  Seated Piriformis Stretch - 3 x daily - 7 x weekly - 3 sets - 30\" hold  Bridge - 2 x daily - 7 x weekly - 1 sets - 10 reps  Standing Hip Abduction with Anterior Support - 2 x daily - 7 x weekly - 1 sets - 10 reps  Sit to Stand with Armchair - 2 x daily - 7 x weekly - 1 sets - 10 reps      Therapeutic Exercise and NMR EXR  [] (31006) Provided verbal/tactile cueing for activities related to strengthening, flexibility, endurance, ROM for improvements in  [] LE / Core stability: LE, hip, and core control with self care, mobility, lifting, ambulation. [] UE / Shoulder complex: cervical, postural, scapular, scapulothoracic and UE control with self care, reaching, carrying, lifting, house/yardwork, driving, computer work.  [] (39292) Provided verbal/tactile cueing for activities related to improving balance, coordination, kinesthetic sense, posture, motor skill, proprioception to assist with   [] LE / Core stability: LE, hip, and core control in self care, mobility, lifting, ambulation and eccentric single leg control.    [] UE / Shoulder complex: cervical, scapular, scapulothoracic and UE control with self care, reaching, carrying, lifting, house/yardwork, driving, computer work.   [] (94665) Therapist is in constant attendance of 2 or more patients providing skilled therapy interventions, but not providing any significant amount of measurable one-on-one time to either patient, for improvements in  [] LE / Core stability: LE, hip, and core control in self care, mobility, lifting, ambulation and eccentric single leg control. [] UE / Shoulder complex: cervical, scapular, scapulothoracic and UE control with self care, reaching, carrying, lifting, house/yardwork, driving, computer work. NMR and Therapeutic Activities:    [x] (53407 or 24961) Provided verbal/tactile cueing for activities related to improving balance, coordination, kinesthetic sense, posture, motor skill, proprioception and motor activation to allow for proper function of   [x] LE / Core, hip and LE with self care and ADLs  [] UE / Shoulder complex: cervical, postural, scapular, scapulothoracic and UE control with self care, carrying, lifting, driving, computer work.   [] (96940) Gait Re-education- Provided training and instruction to the patient for proper LE, core and hip recruitment, positioning, and eccentric body weight control with ambulation re-education, including ascending & descending stairs     Home Management Training / Self Care:  [] (29847) Provided self-care/home management training related to activities of daily living and compensatory training, and/or use of adaptive equipment for improvement with: ADLs and compensatory training, meal preparation, safety procedures and instruction in use of adaptive equipment, including bathing, grooming, dressing, personal hygiene, basic household cleaning and chores.        Home Exercise Program:    [x] (90424) Reviewed/Progressed HEP activities related to strengthening, flexibility, endurance, ROM of   [] LE / Core stability: core, hip and LE for functional self-care, mobility, lifting and ambulation/stair navigation   [] UE / Shoulder complex: cervical, postural, scapular, scapulothoracic and UE control with self care, reaching, carrying, lifting, house/yardwork, driving, computer work  [] (91920)Reviewed/Progressed HEP activities related to improving balance, coordination, kinesthetic sense, posture, motor skill, proprioception of   [] LE: core, hip and LE for self care, mobility, lifting, and ambulation/stair navigation    [] UE / Shoulder complex: cervical, postural,  scapular, scapulothoracic and UE control with self care, reaching, carrying, lifting, house/yardwork, driving, computer work    Manual Treatments:  PROM / STM / Oscillations-Mobs:  G-I, II, III, IV (PA's, Inf., Post.)  [] (92269) Provided manual therapy to mobilize shoulder complex, hip, LE, and/or cervicothoracic/LS spine soft tissue/joints for the purpose of modulating pain, promoting relaxation,  increasing ROM, reducing/eliminating soft tissue swelling/inflammation/restriction, improving soft tissue extensibility and allowing for proper ROM for normal function with   [] LE / Core stability: self care, mobility, lifting and ambulation. [] UE / Shoulder complex: self care, reaching, carrying, lifting, house/yardwork, driving, computer work. Modalities:  [] (07670) Vasopneumatic compression: Utilized vasopneumatic compression to decrease edema / swelling for the purpose of improving mobility and quad tone / recruitment which will allow for increased overall function including but not limited to self-care, transfers, ambulation, and ascending / descending stairs.          Charges:  Timed Code Treatment Minutes: 20   Total Treatment Minutes: 45     [x] EVAL - LOW (95391)   [] EVAL - MOD (36162)  [] EVAL - HIGH (92413)  [] RE-EVAL (23385)  [] TE (60516) x      [] Ionto  [] NMR (45331) x      [] Vaso  [] Manual (50304) x      [] Ultrasound  [x] TA x  1     [] Mech Traction (56597)  [] Gait Training x     [] ES (un) (92667):   [] Aquatic therapy x   [] Other:   [] Group:     GOALS:   Patient stated goal: improve walking  [] Progressing: [] Met: [] Not Met: [] Adjusted    Therapist goals for Patient:   Short Term Goals: To be achieved in: 2 weeks  1. Independent in HEP and progression per patient tolerance, in order to prevent re-injury. [] Progressing: [] Met: [] Not Met: [] Adjusted  2. Patient will have a decrease in pain to facilitate improvement in movement, function, and ADLs as indicated by Functional Deficits. [] Progressing: [] Met: [] Not Met: [] Adjusted    Long Term Goals: To be achieved in: 4 weeks  1. Patient to demonstrate FGA score to 22/30  to demonstrate improved fall risk to assist with reaching prior level of function. [] Progressing: [] Met: [] Not Met: [] Adjusted  2. FOTO score of at least 52 to assist with reaching prior level of function. [] Progressing: [] Met: [] Not Met: [] Adjusted  3. Patient will demonstrate an increase in B hip abd & ext strength to 4-/5 in order to improve confidence with prolonged gait. [] Progressing: [] Met: [] Not Met: [] Adjusted  4. Patient to demonstrate increased reps to 11 for 30\" STS, with good eccentric control, in order to improve community access. [] Progressing: [] Met: [] Not Met: [] Adjusted  5. Patient to improve B quad length for active knee flexion to 95 deg while prone in order to improve balance immediately after sit to stand transfer. [] Progressing: [] Met: [] Not Met: [] Adjusted     Overall Progression Towards Functional goals/ Treatment Progress Update:  [] Patient is progressing as expected towards functional goals listed. [] Progression is slowed due to complexities/Impairments listed. [] Progression has been slowed due to co-morbidities.   [x] Plan just implemented, too soon to assess goals progression <30days   [] Goals require adjustment due to lack of progress  [] Patient is not progressing as expected and requires additional follow up with physician  [] Other    Persisting Functional Limitations/Impairments:  []Sleeping []Sitting               []Standing [x]Transfers        [x]Walking []Kneeling               [x]Stairs [x]Squatting / bending   []ADLs []Reaching  [x]Lifting  []Housework  []Driving []Job related tasks  []Sports/Recreation []Other:        ASSESSMENT:  See eval    Treatment/Activity Tolerance:  [x] Patient able to complete tx [] Patient limited by fatigue  [] Patient limited by pain  [] Patient limited by other medical complications  [] Other:     Prognosis: [x] Good [] Fair  [] Poor    Patient Requires Follow-up: [x] Yes  [] No    Plan for next treatment session:    PLAN: See eval. PT 2x / week for 6 weeks. [] Continue per plan of care [] Alter current plan (see comments)  [x] Plan of care initiated [] Hold pending MD visit [] Discharge    Electronically signed by: Mary Alice Man, PT PT, DPT    Note: If patient does not return for scheduled/ recommended follow up visits, his note will serve as a discharge from care along with most recent update on progress.

## 2022-11-28 ENCOUNTER — HOSPITAL ENCOUNTER (OUTPATIENT)
Dept: PHYSICAL THERAPY | Age: 77
Setting detail: THERAPIES SERIES
Discharge: HOME | End: 2022-11-28
Payer: MEDICARE

## 2022-11-28 PROCEDURE — 97530 THERAPEUTIC ACTIVITIES: CPT

## 2022-11-28 PROCEDURE — 97110 THERAPEUTIC EXERCISES: CPT

## 2022-11-28 PROCEDURE — 97112 NEUROMUSCULAR REEDUCATION: CPT

## 2022-11-28 NOTE — FLOWSHEET NOTE
[address] [facility]  Phone: [phone]   Fax: [phone]    Physical Therapy Daily Treatment Note    Date:  2022     Patient Name:  Angel Bedoya    :  1945  MRN: [id number]  Medical Diagnosis: At high risk for falls [Z91.81]  Treatment Diagnosis: Decreased B hip & L knee strength, Impaired balance and gait     Insurance/Certification information:  PT Insurance Information: Medicare. Secondary:   Physician Information:  Dwaine Don MD    Plan of care signed (Y/N): [x]  Yes []  No     Date of Patient follow up with Physician:      Progress Report: []  Yes  [x]  No     Date Range for reporting period:  Beginnin2022 - signed  Ending:       Progress report due (10 Rx/or 30 days whichever is less): visit #10 or 59/54/15 (date)     Recertification due (POC duration/ or 90 days whichever is less): visit #12 or 23 (date)     Visit # Insurance Allowable Auth required? Date Range    mn []  Yes  [x]  No pcy       Latex Allergy:  [x]NO      []YES  Preferred Language for Healthcare:   [x]English       []Other:      Functional Scale:                                                                                                      Date assessed:  [facility] physical FS primary measure score = 46; risk adjusted = 50                        Functional Scale/Test Evaluation 2022 30 day  60 day  Discharge    Tinetti Assessment        30\" STS 8, decr ecc control       FGA 17/30            Pain level:  0/10  Location:  -    SUBJECTIVE:  Pt reports she is feeling stiff today. Pt reporting difficulty with balance and getting up out of a chair.      OBJECTIVE: : Trendelenburg noted B/L, pt not using any AD      RESTRICTIONS/PRECAUTIONS: Brain surgery in , R TKA , HTN, seizure disorder but is well controlled w/medication    Interventions/Exercises:   Therapeutic Exercises (37856) Resistance / level Sets/sec Reps Notes   Seated stepper L 1 5 min      IB - gastroc  HR  2x30\"  1   10    TM -retro  -side stepping       Total gym       Mat table                                   Neuromuscular Re-ed (79852)        AirEx  - DLS  - NBOS: EO and EC  - Tandem    30\"  30\" ea  30\" ea    NO UE Support  Fingertip support for EC  Fingertip support B    Shuttle balance                                                 Therapeutic Activities (26460) /  Functional Tasks / Gait Training (90421)       Step ups - forward 6 inch  10 B B UE support   Step ups - lateral 6 inch  15 B B UE support    Sit to stand transitions 25 inches  24 inches  23 inches  10   10   10 NO UE support                           Gait train w/walking stick                            Manual Intervention (76589)       Quad stretch  Piriformis stretch                                              Modalities:     Pt. Education:  11/16/2022 patient educated on diagnosis, prognosis and expectations for rehab, all patient questions were answered    HEP instruction:  Access Code: [email or web address]  URL: [email or web address]  Date: 11/16/2022  Prepared by: Avani Sanchez     Exercises  Seated Piriformis Stretch - 3 x daily - 7 x weekly - 3 sets - 30\" hold  Bridge - 2 x daily - 7 x weekly - 1 sets - 10 reps  Standing Hip Abduction with Anterior Support - 2 x daily - 7 x weekly - 1 sets - 10 reps  Sit to Stand with Armchair - 2 x daily - 7 x weekly - 1 sets - 10 reps      Therapeutic Exercise and NMR EXR  [x] (36762) Provided verbal/tactile cueing for activities related to strengthening, flexibility, endurance, ROM for improvements in  [x] LE / Core stability: LE, hip, and core control with self care, mobility, lifting, ambulation. [] UE / Shoulder complex: cervical, postural, scapular, scapulothoracic and UE control with self care, reaching, carrying, lifting, house/yardwork, driving, computer work.   [x] (10135) Provided verbal/tactile cueing for activities related to improving balance, coordination, kinesthetic sense, posture, motor skill, proprioception to assist with   [x] LE / Core stability: LE, hip, and core control in self care, mobility, lifting, ambulation and eccentric single leg control. [] UE / Shoulder complex: cervical, scapular, scapulothoracic and UE control with self care, reaching, carrying, lifting, house/yardwork, driving, computer work.   [] (29632) Therapist is in constant attendance of 2 or more patients providing skilled therapy interventions, but not providing any significant amount of measurable one-on-one time to either patient, for improvements in  [] LE / Core stability: LE, hip, and core control in self care, mobility, lifting, ambulation and eccentric single leg control. [] UE / Shoulder complex: cervical, scapular, scapulothoracic and UE control with self care, reaching, carrying, lifting, house/yardwork, driving, computer work.      NMR and Therapeutic Activities:    [x] (01138 or 55966) Provided verbal/tactile cueing for activities related to improving balance, coordination, kinesthetic sense, posture, motor skill, proprioception and motor activation to allow for proper function of   [x] LE / Core, hip and LE with self care and ADLs  [] UE / Shoulder complex: cervical, postural, scapular, scapulothoracic and UE control with self care, carrying, lifting, driving, computer work.   [] (29139) Gait Re-education- Provided training and instruction to the patient for proper LE, core and hip recruitment, positioning, and eccentric body weight control with ambulation re-education, including ascending & descending stairs     Home Management Training / Self Care:  [] (36007) Provided self-care/home management training related to activities of daily living and compensatory training, and/or use of adaptive equipment for improvement with: ADLs and compensatory training, meal preparation, safety procedures and instruction in use of adaptive equipment, including bathing, grooming, dressing, personal hygiene, basic household cleaning and chores. Home Exercise Program:    [] (50659) Reviewed/Progressed HEP activities related to strengthening, flexibility, endurance, ROM of   [] LE / Core stability: core, hip and LE for functional self-care, mobility, lifting and ambulation/stair navigation   [] UE / Shoulder complex: cervical, postural, scapular, scapulothoracic and UE control with self care, reaching, carrying, lifting, house/yardwork, driving, computer work  [] (00295)Reviewed/Progressed HEP activities related to improving balance, coordination, kinesthetic sense, posture, motor skill, proprioception of   [] LE: core, hip and LE for self care, mobility, lifting, and ambulation/stair navigation    [] UE / Shoulder complex: cervical, postural,  scapular, scapulothoracic and UE control with self care, reaching, carrying, lifting, house/yardwork, driving, computer work    Manual Treatments:  PROM / STM / Oscillations-Mobs:  G-I, II, III, IV (PA's, Inf., Post.)  [] (00736) Provided manual therapy to mobilize shoulder complex, hip, LE, and/or cervicothoracic/LS spine soft tissue/joints for the purpose of modulating pain, promoting relaxation,  increasing ROM, reducing/eliminating soft tissue swelling/inflammation/restriction, improving soft tissue extensibility and allowing for proper ROM for normal function with   [] LE / Core stability: self care, mobility, lifting and ambulation. [] UE / Shoulder complex: self care, reaching, carrying, lifting, house/yardwork, driving, computer work. Modalities:  [] (01888) Vasopneumatic compression: Utilized vasopneumatic compression to decrease edema / swelling for the purpose of improving mobility and quad tone / recruitment which will allow for increased overall function including but not limited to self-care, transfers, ambulation, and ascending / descending stairs.          Charges:  Timed Code Treatment Minutes: 42   Total Treatment Minutes: 42     [] EVAL - LOW (66310)   [] EVAL - MOD (74297)  [] EVAL - HIGH (27867)  [] RE-EVAL (50606)  [x] TE (77059) x  1    [] Ionto  [x] NMR (60469) x  1    [] Vaso  [] Manual (87317) x      [] Ultrasound  [x] TA x  1     [] Mech Traction (95063)  [] Gait Training x     [] ES (un) (65035):   [] Aquatic therapy x   [] Other:   [] Group:     GOALS:   Patient stated goal: improve walking  [] Progressing: [] Met: [] Not Met: [] Adjusted    Therapist goals for Patient:   Short Term Goals: To be achieved in: 2 weeks  1. Independent in HEP and progression per patient tolerance, in order to prevent re-injury. [] Progressing: [] Met: [] Not Met: [] Adjusted  2. Patient will have a decrease in pain to facilitate improvement in movement, function, and ADLs as indicated by Functional Deficits. [] Progressing: [] Met: [] Not Met: [] Adjusted    Long Term Goals: To be achieved in: 4 weeks  1. Patient to demonstrate FGA score to 22/30  to demonstrate improved fall risk to assist with reaching prior level of function. [] Progressing: [] Met: [] Not Met: [] Adjusted  2. FOTO score of at least 52 to assist with reaching prior level of function. [] Progressing: [] Met: [] Not Met: [] Adjusted  3. Patient will demonstrate an increase in B hip abd & ext strength to 4-/5 in order to improve confidence with prolonged gait. [] Progressing: [] Met: [] Not Met: [] Adjusted  4. Patient to demonstrate increased reps to 11 for 30\" STS, with good eccentric control, in order to improve community access. [] Progressing: [] Met: [] Not Met: [] Adjusted  5. Patient to improve B quad length for active knee flexion to 95 deg while prone in order to improve balance immediately after sit to stand transfer. [] Progressing: [] Met: [] Not Met: [] Adjusted     Overall Progression Towards Functional goals/ Treatment Progress Update:  [] Patient is progressing as expected towards functional goals listed.     [] Progression is

## 2022-11-30 ENCOUNTER — HOSPITAL ENCOUNTER (OUTPATIENT)
Dept: PHYSICAL THERAPY | Age: 77
Setting detail: THERAPIES SERIES
Discharge: HOME | End: 2022-11-30
Payer: MEDICARE

## 2022-11-30 PROCEDURE — 97116 GAIT TRAINING THERAPY: CPT

## 2022-11-30 PROCEDURE — 97110 THERAPEUTIC EXERCISES: CPT

## 2022-11-30 NOTE — FLOWSHEET NOTE
[facility]  Phone: [phone]   Fax: [phone]    Physical Therapy Daily Treatment Note    Date:  2022     Patient Name:  Jose Brothers    :  1945  MRN: [id number]  Medical Diagnosis: At high risk for falls [Z91.81]  Treatment Diagnosis: Decreased B hip & L knee strength, Impaired balance and gait     Insurance/Certification information:  PT Insurance Information: Medicare. Secondary:   Physician Information:  Saritha Newman MD    Plan of care signed (Y/N): [x]  Yes []  No     Date of Patient follow up with Physician:      Progress Report: []  Yes  [x]  No     Date Range for reporting period:  Beginnin2022 - signed  Ending:       Progress report due (10 Rx/or 30 days whichever is less): visit #10 or  (date)     Recertification due (POC duration/ or 90 days whichever is less): visit #12 or 23 (date)     Visit # Insurance Allowable Auth required? Date Range   3/12 mn []  Yes  [x]  No pcy       Latex Allergy:  [x]NO      []YES  Preferred Language for Healthcare:   [x]English       []Other:      Functional Scale:                                                                                                      Date assessed:  [facility] physical FS primary measure score = 46; risk adjusted = 50                        Functional Scale/Test Evaluation 2022 30 day  60 day  Discharge    Tinetti Assessment        30\" STS 8, decr ecc control       FGA 17/30            Pain level:  0/10  Location:  -    SUBJECTIVE:  Pt states she was sore after the last session. She has  been working on standing up out of a chair without her hands and with proper form.      OBJECTIVE: : Trendelenburg noted B/L, pt not using any AD      RESTRICTIONS/PRECAUTIONS: Brain surgery in , R TKA , HTN, seizure disorder but is well controlled w/medication    Interventions/Exercises:   Therapeutic Exercises (83215) Resistance / level Sets/sec Reps Notes   Seated stepper L 1 6 min      IB - gastroc  HR  2x30\"  30\"       HSS  30\" B     TM -retro  -side stepping       Total gym       Mat table- stretches to perform before getting out of bed in the morning   - LTR  - SKTC  - Bridge  - Seated EOM lumbar flexion        10  10  10  5                                Neuromuscular Re-ed (74272)        AirEx  - DLS  - NBOS: EO and EC  - Tandem       NO UE Support  Fingertip support for EC  Fingertip support B    Shuttle balance       3 way SLR stance leg on Airex   10 B B UE support                                      Therapeutic Activities (12587) /  Functional Tasks / Gait Training (99403)       Step ups - forward 6 inch  15 B 1 UE support   Step ups - lateral 6 inch  15 B 1 UE support    Sit to stand transitions 25 inches  24 inches  23 inches  NO UE support                           Gait train w/walking stick                            Manual Intervention (04249)       Quad stretch  Piriformis stretch                                              Modalities:     Pt. Education:  11/16/2022 patient educated on diagnosis, prognosis and expectations for rehab, all patient questions were answered    HEP instruction:  Access Code: [email or web address]  URL: [employer]/  Date: 11/16/2022  Prepared by: Leidy Crutch     Exercises  Seated Piriformis Stretch - 3 x daily - 7 x weekly - 3 sets - 30\" hold  Bridge - 2 x daily - 7 x weekly - 1 sets - 10 reps  Standing Hip Abduction with Anterior Support - 2 x daily - 7 x weekly - 1 sets - 10 reps  Sit to Stand with Armchair - 2 x daily - 7 x weekly - 1 sets - 10 reps    Access Code: [email or web address]  URL: [employer]/  Date: 11/30/2022  Prepared by: Hillary Brown    Exercises  Supine Lower Trunk Rotation - 1 x daily - 7 x weekly - 1 sets - 10 reps  Hooklying Single Knee to Chest Stretch - 1 x daily - 7 x weekly - 1 sets - 10 reps  Supine Bridge - 1 x daily - 7 x weekly - 1 sets - 10 reps  Seated Flexion Stretch - 1 x daily - 7 x weekly - 1 sets - 5 reps    Therapeutic Exercise and NMR EXR  [x] (21346) Provided verbal/tactile cueing for activities related to strengthening, flexibility, endurance, ROM for improvements in  [x] LE / Core stability: LE, hip, and core control with self care, mobility, lifting, ambulation. [] UE / Shoulder complex: cervical, postural, scapular, scapulothoracic and UE control with self care, reaching, carrying, lifting, house/yardwork, driving, computer work. [x] (18765) Provided verbal/tactile cueing for activities related to improving balance, coordination, kinesthetic sense, posture, motor skill, proprioception to assist with   [x] LE / Core stability: LE, hip, and core control in self care, mobility, lifting, ambulation and eccentric single leg control. [] UE / Shoulder complex: cervical, scapular, scapulothoracic and UE control with self care, reaching, carrying, lifting, house/yardwork, driving, computer work.   [] (90239) Therapist is in constant attendance of 2 or more patients providing skilled therapy interventions, but not providing any significant amount of measurable one-on-one time to either patient, for improvements in  [] LE / Core stability: LE, hip, and core control in self care, mobility, lifting, ambulation and eccentric single leg control. [] UE / Shoulder complex: cervical, scapular, scapulothoracic and UE control with self care, reaching, carrying, lifting, house/yardwork, driving, computer work.      NMR and Therapeutic Activities:    [x] (97386 or 88655) Provided verbal/tactile cueing for activities related to improving balance, coordination, kinesthetic sense, posture, motor skill, proprioception and motor activation to allow for proper function of   [x] LE / Core, hip and LE with self care and ADLs  [] UE / Shoulder complex: cervical, postural, scapular, scapulothoracic and UE control with self care, carrying, lifting, driving, computer work. [x] (45845) Gait Re-education- Provided training and instruction to the patient for proper LE, core and hip recruitment, positioning, and eccentric body weight control with ambulation re-education, including ascending & descending stairs     Home Management Training / Self Care:  [] (53803) Provided self-care/home management training related to activities of daily living and compensatory training, and/or use of adaptive equipment for improvement with: ADLs and compensatory training, meal preparation, safety procedures and instruction in use of adaptive equipment, including bathing, grooming, dressing, personal hygiene, basic household cleaning and chores.        Home Exercise Program:    [] (69556) Reviewed/Progressed HEP activities related to strengthening, flexibility, endurance, ROM of   [] LE / Core stability: core, hip and LE for functional self-care, mobility, lifting and ambulation/stair navigation   [] UE / Shoulder complex: cervical, postural, scapular, scapulothoracic and UE control with self care, reaching, carrying, lifting, house/yardwork, driving, computer work  [] (27518)Reviewed/Progressed HEP activities related to improving balance, coordination, kinesthetic sense, posture, motor skill, proprioception of   [] LE: core, hip and LE for self care, mobility, lifting, and ambulation/stair navigation    [] UE / Shoulder complex: cervical, postural,  scapular, scapulothoracic and UE control with self care, reaching, carrying, lifting, house/yardwork, driving, computer work    Manual Treatments:  PROM / STM / Oscillations-Mobs:  G-I, II, III, IV (PA's, Inf., Post.)  [] (01814) Provided manual therapy to mobilize shoulder complex, hip, LE, and/or cervicothoracic/LS spine soft tissue/joints for the purpose of modulating pain, promoting relaxation,  increasing ROM, reducing/eliminating soft tissue swelling/inflammation/restriction, improving soft tissue extensibility and allowing for proper ROM for normal function with   [] LE / Core stability: self care, mobility, lifting and ambulation. [] UE / Shoulder complex: self care, reaching, carrying, lifting, house/yardwork, driving, computer work. Modalities:  [] (72832) Vasopneumatic compression: Utilized vasopneumatic compression to decrease edema / swelling for the purpose of improving mobility and quad tone / recruitment which will allow for increased overall function including but not limited to self-care, transfers, ambulation, and ascending / descending stairs. Charges:  Timed Code Treatment Minutes: 40   Total Treatment Minutes: 40     [] EVAL - LOW (60397)   [] EVAL - MOD (92202)  [] EVAL - HIGH (33302)  [] RE-EVAL (58610)  [x] TE (18645) x 2    [] Ionto  [] NMR (82693) x  1    [] Vaso  [] Manual (89323) x      [] Ultrasound  [] TA x  1     [] Mech Traction (93025)  [x] Gait Training x  1   [] ES (un) (57078):   [] Aquatic therapy x   [] Other:   [] Group:     GOALS:   Patient stated goal: improve walking  [] Progressing: [] Met: [] Not Met: [] Adjusted    Therapist goals for Patient:   Short Term Goals: To be achieved in: 2 weeks  1. Independent in HEP and progression per patient tolerance, in order to prevent re-injury. [] Progressing: [] Met: [] Not Met: [] Adjusted  2. Patient will have a decrease in pain to facilitate improvement in movement, function, and ADLs as indicated by Functional Deficits. [] Progressing: [] Met: [] Not Met: [] Adjusted    Long Term Goals: To be achieved in: 4 weeks  1. Patient to demonstrate FGA score to 22/30  to demonstrate improved fall risk to assist with reaching prior level of function. [] Progressing: [] Met: [] Not Met: [] Adjusted  2. FOTO score of at least 52 to assist with reaching prior level of function. [] Progressing: [] Met: [] Not Met: [] Adjusted  3. Patient will demonstrate an increase in B hip abd & ext strength to 4-/5 in order to improve confidence with prolonged gait.      [] Progressing: [] Met: [] Not Met: [] Adjusted  4. Patient to demonstrate increased reps to 11 for 30\" STS, with good eccentric control, in order to improve community access. [] Progressing: [] Met: [] Not Met: [] Adjusted  5. Patient to improve B quad length for active knee flexion to 95 deg while prone in order to improve balance immediately after sit to stand transfer. [] Progressing: [] Met: [] Not Met: [] Adjusted     Overall Progression Towards Functional goals/ Treatment Progress Update:  [] Patient is progressing as expected towards functional goals listed. [] Progression is slowed due to complexities/Impairments listed. [] Progression has been slowed due to co-morbidities. [x] Plan just implemented, too soon to assess goals progression <30days   [] Goals require adjustment due to lack of progress  [] Patient is not progressing as expected and requires additional follow up with physician  [] Other    Persisting Functional Limitations/Impairments:  []Sleeping []Sitting               []Standing [x]Transfers        [x]Walking []Kneeling               [x]Stairs [x]Squatting / bending   []ADLs []Reaching  [x]Lifting  []Housework  []Driving []Job related tasks  []Sports/Recreation []Other:        ASSESSMENT: Continued with LE strengthening and step activities. Pt reported difficulty when getting out of bed in the morning, so she was shown stretches to perform prior to getting out of bed to loosen her back up. Reported feeling better when leaving. Will benefit from continuing with skilled therapy to improve strength and balance to improve pt gait and functional mobility.       Treatment/Activity Tolerance:  [x] Patient able to complete tx [] Patient limited by fatigue  [] Patient limited by pain  [] Patient limited by other medical complications  [] Other:     Prognosis: [x] Good [] Fair  [] Poor    Patient Requires Follow-up: [x] Yes  [] No    Plan for next treatment session:    PLAN: See jason. PT 2x / week for 6 weeks. [x] Continue per plan of care [] Alter current plan (see comments)  [] Plan of care initiated [] Hold pending MD visit [] Discharge    Electronically signed by: Richard President, PT DPT    Note: If patient does not return for scheduled/ recommended follow up visits, his note will serve as a discharge from care along with most recent update on progress.

## 2022-12-06 ENCOUNTER — HOSPITAL ENCOUNTER (OUTPATIENT)
Dept: PHYSICAL THERAPY | Age: 77
Setting detail: THERAPIES SERIES
Discharge: HOME | End: 2022-12-06
Payer: MEDICARE

## 2022-12-06 PROCEDURE — 97112 NEUROMUSCULAR REEDUCATION: CPT

## 2022-12-06 PROCEDURE — 97530 THERAPEUTIC ACTIVITIES: CPT

## 2022-12-06 PROCEDURE — 97110 THERAPEUTIC EXERCISES: CPT

## 2022-12-06 NOTE — FLOWSHEET NOTE
[facility]  Phone: [phone]   Fax: [phone]    Physical Therapy Daily Treatment Note    Date:  2022     Patient Name:  Mohamud Gutiérrez    :  1945  MRN: [id number]  Medical Diagnosis: At high risk for falls [Z91.81]  Treatment Diagnosis: Decreased B hip & L knee strength, Impaired balance and gait     Insurance/Certification information:  PT Insurance Information: Medicare. Secondary:   Physician Information:  Mic Blanco MD    Plan of care signed (Y/N): [x]  Yes []  No     Date of Patient follow up with Physician:      Progress Report: []  Yes  [x]  No     Date Range for reporting period:  Beginnin2022 - signed  Ending:       Progress report due (10 Rx/or 30 days whichever is less): visit #10 or 26 (date)     Recertification due (POC duration/ or 90 days whichever is less): visit #12 or 23 (date)     Visit # Insurance Allowable Auth required? Date Range    mn []  Yes  [x]  No pcy       Latex Allergy:  [x]NO      []YES  Preferred Language for Healthcare:   [x]English       []Other:      Functional Scale:                                                                                                      Date assessed:  [facility] physical FS primary measure score = 46; risk adjusted = 50                        Functional Scale/Test Evaluation 2022 30 day  60 day  Discharge    Tinetti Assessment        30\" STS 8, decr ecc control       FGA             Pain level:  1-2/10  Location:  knee    SUBJECTIVE:  Pt says she has no new complaints and feels like therapy is helping. Pt has been doing stretches in the morning and reports feeling less stiff.     OBJECTIVE: : Trendelenburg noted B/L, pt not using any AD      RESTRICTIONS/PRECAUTIONS: Brain surgery in , R TKA , HTN, seizure disorder but is well controlled w/medication    Interventions/Exercises:   Therapeutic Exercises [phone]) Resistance / level Sets/sec Reps Notes   Seated stepper L 3 6 min      IB - gastroc  HR  2x30\"  30\"       HSS  2x30\" B     TM -retro  -side stepping       Total gym       Mat table- stretches to perform before getting out of bed in the morning   - LTR  - SKTC  - Bridge  - Seated EOM lumbar flexion                                       Neuromuscular Re-ed (81866)        AirEx  - DLS  - NBOS: EO and EC  - Tandem       NO UE Support  Fingertip support for EC  Fingertip support B    Shuttle balance       3 way SLR stance leg on Airex  Squats on AirEx   10 ea B  10 B UE support @ ballet barre  B UE support @ ballet barre                                      Therapeutic Activities (56611) /  Functional Tasks / Gait Training (44826)       Step ups - forward 6 inch  15 B 0 UE support   Step ups - lateral 6 inch  15 B 0 UE support    Sit to stand transitions 22\"  20\"  10   10 No UE support                           Gait train w/walking stick                            Manual Intervention (89976)       Quad stretch  - S/L  Piriformis stretch  -supine  3x30\" B  3x30\" B                                            Modalities:     Pt. Education:  11/16/2022 patient educated on diagnosis, prognosis and expectations for rehab, all patient questions were answered    HEP instruction:  Access Code: [email or web address]  URL: [email or web address]  Date: 11/16/2022  Prepared by: Lex Dang     Exercises  Seated Piriformis Stretch - 3 x daily - 7 x weekly - 3 sets - 30\" hold  Bridge - 2 x daily - 7 x weekly - 1 sets - 10 reps  Standing Hip Abduction with Anterior Support - 2 x daily - 7 x weekly - 1 sets - 10 reps  Sit to Stand with Armchair - 2 x daily - 7 x weekly - 1 sets - 10 reps    Access Code: [email or web address]  URL: [email or web address]  Date: 11/30/2022  Prepared by: Shirleen Rubinstein    Exercises  Supine Lower Trunk Rotation - 1 x daily - 7 x weekly - 1 sets - 10 reps  Hooklying Single Knee to Chest Stretch - 1 x daily - 7 x weekly - 1 sets - 10 reps  Supine Bridge - 1 x daily - 7 x weekly - 1 sets - 10 reps  Seated Flexion Stretch - 1 x daily - 7 x weekly - 1 sets - 5 reps    Therapeutic Exercise and NMR EXR  [x] (93520) Provided verbal/tactile cueing for activities related to strengthening, flexibility, endurance, ROM for improvements in  [x] LE / Core stability: LE, hip, and core control with self care, mobility, lifting, ambulation. [] UE / Shoulder complex: cervical, postural, scapular, scapulothoracic and UE control with self care, reaching, carrying, lifting, house/yardwork, driving, computer work. [x] (11304) Provided verbal/tactile cueing for activities related to improving balance, coordination, kinesthetic sense, posture, motor skill, proprioception to assist with   [x] LE / Core stability: LE, hip, and core control in self care, mobility, lifting, ambulation and eccentric single leg control. [] UE / Shoulder complex: cervical, scapular, scapulothoracic and UE control with self care, reaching, carrying, lifting, house/yardwork, driving, computer work.   [] (79967) Therapist is in constant attendance of 2 or more patients providing skilled therapy interventions, but not providing any significant amount of measurable one-on-one time to either patient, for improvements in  [] LE / Core stability: LE, hip, and core control in self care, mobility, lifting, ambulation and eccentric single leg control. [] UE / Shoulder complex: cervical, scapular, scapulothoracic and UE control with self care, reaching, carrying, lifting, house/yardwork, driving, computer work.      NMR and Therapeutic Activities:    [x] (34944 or 57430) Provided verbal/tactile cueing for activities related to improving balance, coordination, kinesthetic sense, posture, motor skill, proprioception and motor activation to allow for proper function of   [x] LE / Core, hip and LE with self care and ADLs  [] UE / Shoulder complex: cervical, postural, scapular, scapulothoracic and UE control with self care, carrying, lifting, driving, computer work.   [] (87152) Gait Re-education- Provided training and instruction to the patient for proper LE, core and hip recruitment, positioning, and eccentric body weight control with ambulation re-education, including ascending & descending stairs     Home Management Training / Self Care:  [] (83416) Provided self-care/home management training related to activities of daily living and compensatory training, and/or use of adaptive equipment for improvement with: ADLs and compensatory training, meal preparation, safety procedures and instruction in use of adaptive equipment, including bathing, grooming, dressing, personal hygiene, basic household cleaning and chores.        Home Exercise Program:    [] (48990) Reviewed/Progressed HEP activities related to strengthening, flexibility, endurance, ROM of   [] LE / Core stability: core, hip and LE for functional self-care, mobility, lifting and ambulation/stair navigation   [] UE / Shoulder complex: cervical, postural, scapular, scapulothoracic and UE control with self care, reaching, carrying, lifting, house/yardwork, driving, computer work  [] (32742)Reviewed/Progressed HEP activities related to improving balance, coordination, kinesthetic sense, posture, motor skill, proprioception of   [] LE: core, hip and LE for self care, mobility, lifting, and ambulation/stair navigation    [] UE / Shoulder complex: cervical, postural,  scapular, scapulothoracic and UE control with self care, reaching, carrying, lifting, house/yardwork, driving, computer work    Manual Treatments:  PROM / STM / Oscillations-Mobs:  G-I, II, III, IV (PA's, Inf., Post.)  [] (93809) Provided manual therapy to mobilize shoulder complex, hip, LE, and/or cervicothoracic/LS spine soft tissue/joints for the purpose of modulating pain, promoting relaxation,  increasing ROM, reducing/eliminating soft tissue swelling/inflammation/restriction, improving soft tissue extensibility and allowing for proper ROM for normal function with   [] LE / Core stability: self care, mobility, lifting and ambulation. [] UE / Shoulder complex: self care, reaching, carrying, lifting, house/yardwork, driving, computer work. Modalities:  [] (71321) Vasopneumatic compression: Utilized vasopneumatic compression to decrease edema / swelling for the purpose of improving mobility and quad tone / recruitment which will allow for increased overall function including but not limited to self-care, transfers, ambulation, and ascending / descending stairs. Charges:  Timed Code Treatment Minutes: 45   Total Treatment Minutes: 45     [] EVAL - LOW (52386)   [] EVAL - MOD (38032)  [] EVAL - HIGH (33453)  [] RE-EVAL (75011)  [x] TE (93352) x 1    [] Ionto  [x] NMR (60435) x  1    [] Vaso  [] Manual (15217) x      [] Ultrasound  [x] TA x  1     [] Mech Traction (29151)  [] Gait Training x    [] ES (un) (66479):   [] Aquatic therapy x   [] Other:   [] Group:     GOALS:   Patient stated goal: improve walking  [] Progressing: [] Met: [] Not Met: [] Adjusted    Therapist goals for Patient:   Short Term Goals: To be achieved in: 2 weeks  1. Independent in HEP and progression per patient tolerance, in order to prevent re-injury. [] Progressing: [] Met: [] Not Met: [] Adjusted  2. Patient will have a decrease in pain to facilitate improvement in movement, function, and ADLs as indicated by Functional Deficits. [] Progressing: [] Met: [] Not Met: [] Adjusted    Long Term Goals: To be achieved in: 4 weeks  1. Patient to demonstrate FGA score to 22/30  to demonstrate improved fall risk to assist with reaching prior level of function. [] Progressing: [] Met: [] Not Met: [] Adjusted  2. FOTO score of at least 52 to assist with reaching prior level of function. [] Progressing: [] Met: [] Not Met: [] Adjusted  3.  Patient will demonstrate an increase in B hip abd & ext strength to 4-/5 in order to improve confidence with prolonged gait. [] Progressing: [] Met: [] Not Met: [] Adjusted  4. Patient to demonstrate increased reps to 11 for 30\" STS, with good eccentric control, in order to improve community access. [] Progressing: [] Met: [] Not Met: [] Adjusted  5. Patient to improve B quad length for active knee flexion to 95 deg while prone in order to improve balance immediately after sit to stand transfer. [] Progressing: [] Met: [] Not Met: [] Adjusted     Overall Progression Towards Functional goals/ Treatment Progress Update:  [] Patient is progressing as expected towards functional goals listed. [] Progression is slowed due to complexities/Impairments listed. [] Progression has been slowed due to co-morbidities. [x] Plan just implemented, too soon to assess goals progression <30days   [] Goals require adjustment due to lack of progress  [] Patient is not progressing as expected and requires additional follow up with physician  [] Other    Persisting Functional Limitations/Impairments:  []Sleeping []Sitting               []Standing [x]Transfers        [x]Walking []Kneeling               [x]Stairs [x]Squatting / bending   []ADLs []Reaching  [x]Lifting  []Housework  []Driving []Job related tasks  []Sports/Recreation []Other:        ASSESSMENT: Session focused on LE strengthening, balance, and functional activity. Pt was able to perform stairs without UE support and improved with sit-to-stand from a lower surface. Pt presents with decreased arm swings and intermittent scissoring with gait. Plan to address gait deficits at next visit. Continue to address LE weakness and balance.      Treatment/Activity Tolerance:  [x] Patient able to complete tx [] Patient limited by fatigue  [] Patient limited by pain  [] Patient limited by other medical complications  [] Other:     Prognosis: [x] Good [] Fair  [] Poor    Patient Requires Follow-up: [x] Yes  [] No    Plan for next treatment session:    PLAN: See eval. PT 2x / week for 6 weeks. [x] Continue per plan of care [] Alter current plan (see comments)  [] Plan of care initiated [] Hold pending MD visit [] Discharge    Electronically signed by:  Brady Rajput, Physical Therapy Student  Therapist was present, directed the patient's care, made skilled judgement, and was responsible for assessment and treatment of the patient. Lolita Mancilla, PT DPT    Note: If patient does not return for scheduled/ recommended follow up visits, his note will serve as a discharge from care along with most recent update on progress.

## 2022-12-08 ENCOUNTER — HOSPITAL ENCOUNTER (OUTPATIENT)
Dept: PHYSICAL THERAPY | Age: 77
Setting detail: THERAPIES SERIES
Discharge: HOME | End: 2022-12-08
Payer: MEDICARE

## 2022-12-08 PROCEDURE — 97110 THERAPEUTIC EXERCISES: CPT

## 2022-12-08 PROCEDURE — 97112 NEUROMUSCULAR REEDUCATION: CPT

## 2022-12-08 PROCEDURE — 97116 GAIT TRAINING THERAPY: CPT

## 2022-12-08 NOTE — FLOWSHEET NOTE
[facility] Physical Therapy  Phone: [phone]   Fax: [phone]    Physical Therapy Daily Treatment Note    Date:  2022     Patient Name:  Soila Bates    :  1945  MRN: [id number]  Medical Diagnosis: At high risk for falls [Z91.81]  Treatment Diagnosis: Decreased B hip & L knee strength, Impaired balance and gait     Insurance/Certification information:  PT Insurance Information: Medicare. Secondary:   Physician Information:  Orlin Kelley MD    Plan of care signed (Y/N): [x]  Yes []  No     Date of Patient follow up with Physician:      Progress Report: []  Yes  [x]  No     Date Range for reporting period:  Beginnin2022 - signed  Ending:       Progress report due (10 Rx/or 30 days whichever is less): visit #10 or  (date)     Recertification due (POC duration/ or 90 days whichever is less): visit #12 or 23 (date)     Visit # Insurance Allowable Auth required? Date Range    mn []  Yes  [x]  No pcy       Latex Allergy:  [x]NO      []YES  Preferred Language for Healthcare:   [x]English       []Other:      Functional Scale:                                                                                                      Date assessed:  [facility] physical FS primary measure score = 46; risk adjusted = 50                        Functional Scale/Test Evaluation 2022 30 day  60 day  Discharge    Tinetti Assessment        30\" STS 8, decr ecc control       FGA 17/30            Pain level:  0/10  Location:  knee    SUBJECTIVE:  Patient reports no pain today, but she is tired. She was worked really hard at her last session.      OBJECTIVE: : Trendelenburg noted B/L, pt not using any AD      RESTRICTIONS/PRECAUTIONS: Brain surgery in , R TKA , HTN, seizure disorder but is well controlled w/medication    Interventions/Exercises:   Therapeutic Exercises (74186) Resistance / level Sets/sec Reps Notes Seated stepper L 3 5 min      IB - gastroc  HR  2x30\"  30\"       HSS  2x30\" B     TM -retro  -side stepping       Total gym       Mat table- stretches to perform before getting out of bed in the morning   - LTR  - SKTC  - Bridge  - Seated EOM lumbar flexion                                       Neuromuscular Re-ed (67516)        AirEx  - DLS  - NBOS: EO and EC  - Tandem       NO UE Support  Fingertip support for EC  Fingertip support B    Shuttle balance       3 way SLR stance leg on Airex  Squats on AirEx  Marching on AirEx   10 ea B  20  20 B B UE support B UE support 2 fingertip support    Walking on gray mats  - fwd  - side stepping  - retro 2 mats    2 laps  2 laps  2 laps No UE suport                               Therapeutic Activities (08074) /  Functional Tasks / Gait Training (74314)       Step ups - forward with 2nd step tap 6 inch  10 B 1 UE support   Step ups - lateral 6 inch  15 B 1 UE support    Sit to stand transitions 22\"  20\"  No UE support      Fwd stepping over tosha w/ weight shift 1 tosha  10 B 1 UE support                  Gait train w/walking stick                            Manual Intervention (60076)       Quad stretch  - S/L  Piriformis stretch  -supine                                             Modalities:     Pt. Education:  11/16/2022 patient educated on diagnosis, prognosis and expectations for rehab, all patient questions were answered    HEP instruction:  Access Code: [email or web address]  URL: [employer]/  Date: 11/16/2022  Prepared by: Bill Self     Exercises  Seated Piriformis Stretch - 3 x daily - 7 x weekly - 3 sets - 30\" hold  Bridge - 2 x daily - 7 x weekly - 1 sets - 10 reps  Standing Hip Abduction with Anterior Support - 2 x daily - 7 x weekly - 1 sets - 10 reps  Sit to Stand with Armchair - 2 x daily - 7 x weekly - 1 sets - 10 reps    Access Code: [email or web address]  URL: [employer]/  Date: 11/30/2022  Prepared by: Karen Palomino Kayode    Exercises  Supine Lower Trunk Rotation - 1 x daily - 7 x weekly - 1 sets - 10 reps  Hooklying Single Knee to Chest Stretch - 1 x daily - 7 x weekly - 1 sets - 10 reps  Supine Bridge - 1 x daily - 7 x weekly - 1 sets - 10 reps  Seated Flexion Stretch - 1 x daily - 7 x weekly - 1 sets - 5 reps    Therapeutic Exercise and NMR EXR  [x] (58019) Provided verbal/tactile cueing for activities related to strengthening, flexibility, endurance, ROM for improvements in  [x] LE / Core stability: LE, hip, and core control with self care, mobility, lifting, ambulation. [] UE / Shoulder complex: cervical, postural, scapular, scapulothoracic and UE control with self care, reaching, carrying, lifting, house/yardwork, driving, computer work. [x] (94235) Provided verbal/tactile cueing for activities related to improving balance, coordination, kinesthetic sense, posture, motor skill, proprioception to assist with   [x] LE / Core stability: LE, hip, and core control in self care, mobility, lifting, ambulation and eccentric single leg control. [] UE / Shoulder complex: cervical, scapular, scapulothoracic and UE control with self care, reaching, carrying, lifting, house/yardwork, driving, computer work.   [] (20519) Therapist is in constant attendance of 2 or more patients providing skilled therapy interventions, but not providing any significant amount of measurable one-on-one time to either patient, for improvements in  [] LE / Core stability: LE, hip, and core control in self care, mobility, lifting, ambulation and eccentric single leg control. [] UE / Shoulder complex: cervical, scapular, scapulothoracic and UE control with self care, reaching, carrying, lifting, house/yardwork, driving, computer work.      NMR and Therapeutic Activities:    [x] (98501 or 65429) Provided verbal/tactile cueing for activities related to improving balance, coordination, kinesthetic sense, posture, motor skill, proprioception and motor activation to allow for proper function of   [x] LE / Core, hip and LE with self care and ADLs  [] UE / Shoulder complex: cervical, postural, scapular, scapulothoracic and UE control with self care, carrying, lifting, driving, computer work. [x] (55991) Gait Re-education- Provided training and instruction to the patient for proper LE, core and hip recruitment, positioning, and eccentric body weight control with ambulation re-education, including ascending & descending stairs     Home Management Training / Self Care:  [] (27288) Provided self-care/home management training related to activities of daily living and compensatory training, and/or use of adaptive equipment for improvement with: ADLs and compensatory training, meal preparation, safety procedures and instruction in use of adaptive equipment, including bathing, grooming, dressing, personal hygiene, basic household cleaning and chores.        Home Exercise Program:    [] (12098) Reviewed/Progressed HEP activities related to strengthening, flexibility, endurance, ROM of   [] LE / Core stability: core, hip and LE for functional self-care, mobility, lifting and ambulation/stair navigation   [] UE / Shoulder complex: cervical, postural, scapular, scapulothoracic and UE control with self care, reaching, carrying, lifting, house/yardwork, driving, computer work  [] (52850)Reviewed/Progressed HEP activities related to improving balance, coordination, kinesthetic sense, posture, motor skill, proprioception of   [] LE: core, hip and LE for self care, mobility, lifting, and ambulation/stair navigation    [] UE / Shoulder complex: cervical, postural,  scapular, scapulothoracic and UE control with self care, reaching, carrying, lifting, house/yardwork, driving, computer work    Manual Treatments:  PROM / STM / Oscillations-Mobs:  G-I, II, III, IV (PA's, Inf., Post.)  [] (62414) Provided manual therapy to mobilize shoulder complex, hip, LE, and/or cervicothoracic/LS spine soft tissue/joints for the purpose of modulating pain, promoting relaxation,  increasing ROM, reducing/eliminating soft tissue swelling/inflammation/restriction, improving soft tissue extensibility and allowing for proper ROM for normal function with   [] LE / Core stability: self care, mobility, lifting and ambulation. [] UE / Shoulder complex: self care, reaching, carrying, lifting, house/yardwork, driving, computer work. Modalities:  [] (21506) Vasopneumatic compression: Utilized vasopneumatic compression to decrease edema / swelling for the purpose of improving mobility and quad tone / recruitment which will allow for increased overall function including but not limited to self-care, transfers, ambulation, and ascending / descending stairs. Charges:  Timed Code Treatment Minutes: 44   Total Treatment Minutes: 44     [] EVAL - LOW (33672)   [] EVAL - MOD (25949)  [] EVAL - HIGH (91067)  [] RE-EVAL (32057)  [x] TE (08466) x 1    [] Ionto  [x] NMR (65696) x  1    [] Vaso  [] Manual (14882) x      [] Ultrasound  [] TA x  1     [] Mech Traction (94607)  [x] Gait Training x 1   [] ES (un) (00449):   [] Aquatic therapy x   [] Other:   [] Group:     GOALS:   Patient stated goal: improve walking  [] Progressing: [] Met: [] Not Met: [] Adjusted    Therapist goals for Patient:   Short Term Goals: To be achieved in: 2 weeks  1. Independent in HEP and progression per patient tolerance, in order to prevent re-injury. [] Progressing: [] Met: [] Not Met: [] Adjusted  2. Patient will have a decrease in pain to facilitate improvement in movement, function, and ADLs as indicated by Functional Deficits. [] Progressing: [] Met: [] Not Met: [] Adjusted    Long Term Goals: To be achieved in: 4 weeks  1. Patient to demonstrate FGA score to 22/30  to demonstrate improved fall risk to assist with reaching prior level of function. [] Progressing: [] Met: [] Not Met: [] Adjusted  2.  FOTO score of at least 52 to assist with reaching prior level of function. [] Progressing: [] Met: [] Not Met: [] Adjusted  3. Patient will demonstrate an increase in B hip abd & ext strength to 4-/5 in order to improve confidence with prolonged gait. [] Progressing: [] Met: [] Not Met: [] Adjusted  4. Patient to demonstrate increased reps to 11 for 30\" STS, with good eccentric control, in order to improve community access. [] Progressing: [] Met: [] Not Met: [] Adjusted  5. Patient to improve B quad length for active knee flexion to 95 deg while prone in order to improve balance immediately after sit to stand transfer. [] Progressing: [] Met: [] Not Met: [] Adjusted     Overall Progression Towards Functional goals/ Treatment Progress Update:  [] Patient is progressing as expected towards functional goals listed. [] Progression is slowed due to complexities/Impairments listed. [] Progression has been slowed due to co-morbidities. [x] Plan just implemented, too soon to assess goals progression <30days   [] Goals require adjustment due to lack of progress  [] Patient is not progressing as expected and requires additional follow up with physician  [] Other    Persisting Functional Limitations/Impairments:  []Sleeping []Sitting               []Standing [x]Transfers        [x]Walking []Kneeling               [x]Stairs [x]Squatting / bending   []ADLs []Reaching  [x]Lifting  []Housework  []Driving []Job related tasks  []Sports/Recreation []Other:        ASSESSMENT: Continued with balance training this date and progressed fwd step ups to 2nd step taps. Also added walking on gray mats to improve pt balance and ability to walk on uneven surfaces. Pt able to complete without UE support and with PT close SBA. Asked pt to bring walking stick NV to practice and ensure proper sequencing. Will continue with balance and strengthening to improve pt gait.      Treatment/Activity Tolerance:  [x] Patient able to complete tx [] Patient limited by fatigue  [] Patient limited by pain  [] Patient limited by other medical complications  [] Other:     Prognosis: [x] Good [] Fair  [] Poor    Patient Requires Follow-up: [x] Yes  [] No    Plan for next treatment session:    PLAN: See eval. PT 2x / week for 6 weeks. [x] Continue per plan of care [] Alter current plan (see comments)  [] Plan of care initiated [] Hold pending MD visit [] Discharge    Electronically signed by:   Dyllan Birmingham, PT DPT    Note: If patient does not return for scheduled/ recommended follow up visits, his note will serve as a discharge from care along with most recent update on progress.

## 2022-12-13 ENCOUNTER — HOSPITAL ENCOUNTER (OUTPATIENT)
Dept: PHYSICAL THERAPY | Age: 77
Setting detail: THERAPIES SERIES
Discharge: HOME | End: 2022-12-13
Payer: MEDICARE

## 2022-12-13 PROCEDURE — 97110 THERAPEUTIC EXERCISES: CPT

## 2022-12-13 PROCEDURE — 97116 GAIT TRAINING THERAPY: CPT

## 2022-12-13 PROCEDURE — 97112 NEUROMUSCULAR REEDUCATION: CPT

## 2022-12-13 NOTE — FLOWSHEET NOTE
[address] [facility] Physical Therapy  Phone: [phone]   Fax: [phone]    Physical Therapy Daily Treatment Note    Date:  2022     Patient Name:  Jose Brothers    :  1945  MRN: [id number]  Medical Diagnosis: At high risk for falls [Z91.81]  Treatment Diagnosis: Decreased B hip & L knee strength, Impaired balance and gait     Insurance/Certification information:  PT Insurance Information: Medicare. Secondary:   Physician Information:  Saritha Newman MD    Plan of care signed (Y/N): [x]  Yes []  No     Date of Patient follow up with Physician:      Progress Report: []  Yes  [x]  No     Date Range for reporting period:  Beginnin2022 - signed  Ending:       Progress report due (10 Rx/or 30 days whichever is less): visit #10 or  (date)     Recertification due (POC duration/ or 90 days whichever is less): visit #12 or 23 (date)     Visit # Insurance Allowable Auth required? Date Range    mn []  Yes  [x]  No pcy       Latex Allergy:  [x]NO      []YES  Preferred Language for Healthcare:   [x]English       []Other:      Functional Scale:                                                                                                      Date assessed:  [facility] physical FS primary measure score = 46; risk adjusted = 50                        Functional Scale/Test Evaluation 2022 30 day  60 day  Discharge    Tinetti Assessment        30\" STS 8, decr ecc control       FGA 17            Pain level:  0/10  Location:  knee    SUBJECTIVE:  Pt reports she felt okay after last session, could tell she had worked out.      OBJECTIVE: : Trendelenburg noted B/L, pt not using any AD  : Pt brought walking stick today      RESTRICTIONS/PRECAUTIONS: Brain surgery in , R TKA , HTN, seizure disorder but is well controlled w/medication    Interventions/Exercises:   Therapeutic Exercises (16015) Resistance / level Sets/sec Reps Notes   Seated stepper L 3 5 min      IB - gastroc  HR  2x30\"  30\"       HSS  x30\" B     HFS  X30\" B     TM -retro  -side stepping       Total gym       Mat table- stretches to perform before getting out of bed in the morning   - LTR  - SKTC  - Bridge  - Seated EOM lumbar flexion                                       Neuromuscular Re-ed (91502)        AirEx  - DLS  - NBOS: EO and EC  - Tandem       NO UE Support  Fingertip support for EC  Fingertip support B    Shuttle balance       3 way SLR stance leg on Airex  Squats on AirEx  Marching on AirEx   B UE support B UE support 2 fingertip support    Walking on gray mats  - fwd  - side stepping  - retro 2 mats    2 laps  2 laps  2 laps No UE suport                               Therapeutic Activities (27434) /  Functional Tasks / Gait Training (72126)       Step ups - forward  6 inch  10 B 1 UE support   Step ups - lateral 6 inch  10 B 1 UE support    Sit to stand transitions 22\"  20\"  No UE support      Fwd stepping over tosha w/ weight shift 1 tosha 1 UE support                  Gait train w/walking stick   8 min  12/13: Used in L hand, practiced ambulation with 3 point pattern (stick, R LE, L LE), also encouraged pt to keep more space between feet                         Manual Intervention (35632)       Quad stretch  - S/L  Piriformis stretch  -supine                                             Modalities:     Pt. Education:  11/16/2022 patient educated on diagnosis, prognosis and expectations for rehab, all patient questions were answered    HEP instruction:  Access Code: [email or web address]  URL: [email or web address]  Date: 11/16/2022  Prepared by: Lolita Mancilla     Exercises  Seated Piriformis Stretch - 3 x daily - 7 x weekly - 3 sets - 30\" hold  Bridge - 2 x daily - 7 x weekly - 1 sets - 10 reps  Standing Hip Abduction with Anterior Support - 2 x daily - 7 x weekly - 1 sets - 10 reps  Sit to Stand with Armchair - 2 x daily - 7 x weekly - 1 sets - 10 reps    Access Code: [email or web address]  URL: [email or web address]  Date: 11/30/2022  Prepared by: Nick Yuan    Exercises  Supine Lower Trunk Rotation - 1 x daily - 7 x weekly - 1 sets - 10 reps  Hooklying Single Knee to Chest Stretch - 1 x daily - 7 x weekly - 1 sets - 10 reps  Supine Bridge - 1 x daily - 7 x weekly - 1 sets - 10 reps  Seated Flexion Stretch - 1 x daily - 7 x weekly - 1 sets - 5 reps    Therapeutic Exercise and NMR EXR  [x] (82097) Provided verbal/tactile cueing for activities related to strengthening, flexibility, endurance, ROM for improvements in  [x] LE / Core stability: LE, hip, and core control with self care, mobility, lifting, ambulation. [] UE / Shoulder complex: cervical, postural, scapular, scapulothoracic and UE control with self care, reaching, carrying, lifting, house/yardwork, driving, computer work. [x] (34578) Provided verbal/tactile cueing for activities related to improving balance, coordination, kinesthetic sense, posture, motor skill, proprioception to assist with   [x] LE / Core stability: LE, hip, and core control in self care, mobility, lifting, ambulation and eccentric single leg control. [] UE / Shoulder complex: cervical, scapular, scapulothoracic and UE control with self care, reaching, carrying, lifting, house/yardwork, driving, computer work.   [] (84035) Therapist is in constant attendance of 2 or more patients providing skilled therapy interventions, but not providing any significant amount of measurable one-on-one time to either patient, for improvements in  [] LE / Core stability: LE, hip, and core control in self care, mobility, lifting, ambulation and eccentric single leg control. [] UE / Shoulder complex: cervical, scapular, scapulothoracic and UE control with self care, reaching, carrying, lifting, house/yardwork, driving, computer work.      NMR and Therapeutic Activities:    [x] (89465 or 23054) Provided verbal/tactile cueing for activities related to improving balance, coordination, kinesthetic sense, posture, motor skill, proprioception and motor activation to allow for proper function of   [x] LE / Core, hip and LE with self care and ADLs  [] UE / Shoulder complex: cervical, postural, scapular, scapulothoracic and UE control with self care, carrying, lifting, driving, computer work. [x] (03099) Gait Re-education- Provided training and instruction to the patient for proper LE, core and hip recruitment, positioning, and eccentric body weight control with ambulation re-education, including ascending & descending stairs     Home Management Training / Self Care:  [] (22916) Provided self-care/home management training related to activities of daily living and compensatory training, and/or use of adaptive equipment for improvement with: ADLs and compensatory training, meal preparation, safety procedures and instruction in use of adaptive equipment, including bathing, grooming, dressing, personal hygiene, basic household cleaning and chores.        Home Exercise Program:    [] (43931) Reviewed/Progressed HEP activities related to strengthening, flexibility, endurance, ROM of   [] LE / Core stability: core, hip and LE for functional self-care, mobility, lifting and ambulation/stair navigation   [] UE / Shoulder complex: cervical, postural, scapular, scapulothoracic and UE control with self care, reaching, carrying, lifting, house/yardwork, driving, computer work  [] (34375)Reviewed/Progressed HEP activities related to improving balance, coordination, kinesthetic sense, posture, motor skill, proprioception of   [] LE: core, hip and LE for self care, mobility, lifting, and ambulation/stair navigation    [] UE / Shoulder complex: cervical, postural,  scapular, scapulothoracic and UE control with self care, reaching, carrying, lifting, house/yardwork, driving, computer work    Manual Treatments:  PROM / STM / Oscillations-Mobs:  G-I, II, III, IV (PA's, Inf., Post.)  [] (63013) Provided manual therapy to mobilize shoulder complex, hip, LE, and/or cervicothoracic/LS spine soft tissue/joints for the purpose of modulating pain, promoting relaxation,  increasing ROM, reducing/eliminating soft tissue swelling/inflammation/restriction, improving soft tissue extensibility and allowing for proper ROM for normal function with   [] LE / Core stability: self care, mobility, lifting and ambulation. [] UE / Shoulder complex: self care, reaching, carrying, lifting, house/yardwork, driving, computer work. Modalities:  [] (51860) Vasopneumatic compression: Utilized vasopneumatic compression to decrease edema / swelling for the purpose of improving mobility and quad tone / recruitment which will allow for increased overall function including but not limited to self-care, transfers, ambulation, and ascending / descending stairs. Charges:  Timed Code Treatment Minutes: 41   Total Treatment Minutes: 41     [] EVAL - LOW (03429)   [] EVAL - MOD (18950)  [] EVAL - HIGH (56438)  [] RE-EVAL (75093)  [x] TE (11374) x 1    [] Ionto  [x] NMR (90063) x  1    [] Vaso  [] Manual (78948) x      [] Ultrasound  [] TA x  1     [] Mech Traction (43801)  [x] Gait Training x 1   [] ES (un) (85829):   [] Aquatic therapy x   [] Other:   [] Group:     GOALS:   Patient stated goal: improve walking  [] Progressing: [] Met: [] Not Met: [] Adjusted    Therapist goals for Patient:   Short Term Goals: To be achieved in: 2 weeks  1. Independent in HEP and progression per patient tolerance, in order to prevent re-injury. [] Progressing: [] Met: [] Not Met: [] Adjusted  2. Patient will have a decrease in pain to facilitate improvement in movement, function, and ADLs as indicated by Functional Deficits. [] Progressing: [] Met: [] Not Met: [] Adjusted    Long Term Goals: To be achieved in: 4 weeks  1.  Patient to demonstrate FGA score to 22/30  to demonstrate improved fall risk to assist with reaching prior level of function. [] Progressing: [] Met: [] Not Met: [] Adjusted  2. FOTO score of at least 52 to assist with reaching prior level of function. [] Progressing: [] Met: [] Not Met: [] Adjusted  3. Patient will demonstrate an increase in B hip abd & ext strength to 4-/5 in order to improve confidence with prolonged gait. [] Progressing: [] Met: [] Not Met: [] Adjusted  4. Patient to demonstrate increased reps to 11 for 30\" STS, with good eccentric control, in order to improve community access. [] Progressing: [] Met: [] Not Met: [] Adjusted  5. Patient to improve B quad length for active knee flexion to 95 deg while prone in order to improve balance immediately after sit to stand transfer. [] Progressing: [] Met: [] Not Met: [] Adjusted     Overall Progression Towards Functional goals/ Treatment Progress Update:  [] Patient is progressing as expected towards functional goals listed. [] Progression is slowed due to complexities/Impairments listed. [] Progression has been slowed due to co-morbidities. [x] Plan just implemented, too soon to assess goals progression <30days   [] Goals require adjustment due to lack of progress  [] Patient is not progressing as expected and requires additional follow up with physician  [] Other    Persisting Functional Limitations/Impairments:  []Sleeping []Sitting               []Standing [x]Transfers        [x]Walking []Kneeling               [x]Stairs [x]Squatting / bending   []ADLs []Reaching  [x]Lifting  []Housework  []Driving []Job related tasks  []Sports/Recreation []Other:        ASSESSMENT: Pt brought walking stick to session this date to practice sequencing. Pt did better with 3 point pattern, but provided encouragement that ambulation speed will increase as pt gets more comfortable using it. Also encouraged pt to increase space between feet to improve stability. Will continue with balance training and LE strengthening to improve pt mobility. Treatment/Activity Tolerance:  [x] Patient able to complete tx [] Patient limited by fatigue  [] Patient limited by pain  [] Patient limited by other medical complications  [] Other:     Prognosis: [x] Good [] Fair  [] Poor    Patient Requires Follow-up: [x] Yes  [] No    Plan for next treatment session:    PLAN: See eval. PT 2x / week for 6 weeks. [x] Continue per plan of care [] Alter current plan (see comments)  [] Plan of care initiated [] Hold pending MD visit [] Discharge    Electronically signed by:   Maryanne Sherman, PT DPT    Note: If patient does not return for scheduled/ recommended follow up visits, his note will serve as a discharge from care along with most recent update on progress.

## 2022-12-14 ENCOUNTER — OFFICE VISIT (OUTPATIENT)
Dept: ORTHOPEDIC SURGERY | Age: 77
End: 2022-12-14
Payer: MEDICARE

## 2022-12-14 ENCOUNTER — TELEPHONE (OUTPATIENT)
Dept: INTERNAL MEDICINE CLINIC | Age: 77
End: 2022-12-14

## 2022-12-14 VITALS — HEIGHT: 67 IN | WEIGHT: 225 LBS | BODY MASS INDEX: 35.31 KG/M2

## 2022-12-14 DIAGNOSIS — M25.512 ACUTE PAIN OF LEFT SHOULDER: Primary | ICD-10-CM

## 2022-12-14 DIAGNOSIS — M75.82 ROTATOR CUFF TENDINITIS, LEFT: ICD-10-CM

## 2022-12-14 PROCEDURE — 1090F PRES/ABSN URINE INCON ASSESS: CPT | Performed by: PHYSICIAN ASSISTANT

## 2022-12-14 PROCEDURE — G8427 DOCREV CUR MEDS BY ELIG CLIN: HCPCS | Performed by: PHYSICIAN ASSISTANT

## 2022-12-14 PROCEDURE — 1036F TOBACCO NON-USER: CPT | Performed by: PHYSICIAN ASSISTANT

## 2022-12-14 PROCEDURE — 99213 OFFICE O/P EST LOW 20 MIN: CPT | Performed by: PHYSICIAN ASSISTANT

## 2022-12-14 PROCEDURE — G8399 PT W/DXA RESULTS DOCUMENT: HCPCS | Performed by: PHYSICIAN ASSISTANT

## 2022-12-14 PROCEDURE — G8484 FLU IMMUNIZE NO ADMIN: HCPCS | Performed by: PHYSICIAN ASSISTANT

## 2022-12-14 PROCEDURE — G8417 CALC BMI ABV UP PARAM F/U: HCPCS | Performed by: PHYSICIAN ASSISTANT

## 2022-12-14 PROCEDURE — 1123F ACP DISCUSS/DSCN MKR DOCD: CPT | Performed by: PHYSICIAN ASSISTANT

## 2022-12-14 NOTE — TELEPHONE ENCOUNTER
Patient  called back and I gave her instructions, hours and phone number to  [facility]. She was going to go there to be seen.

## 2022-12-14 NOTE — TELEPHONE ENCOUNTER
Patient stated that she has had pain in her left arm for about a month now patient declined any other SX and stated that she has been to the after hours  ortho clinic before and is going to go there since there are no appointments in the office tomorrow. She just wanted to give  notice that she was going to go there to be seen for the month long arm pain. Patient declined going to overflow clinic and declined to do a vv.

## 2022-12-15 ENCOUNTER — OFFICE VISIT (OUTPATIENT)
Dept: ORTHOPEDIC SURGERY | Age: 77
End: 2022-12-15
Payer: MEDICARE

## 2022-12-15 ENCOUNTER — APPOINTMENT (OUTPATIENT)
Dept: PHYSICAL THERAPY | Age: 77
End: 2022-12-15
Payer: MEDICARE

## 2022-12-15 VITALS — HEIGHT: 67 IN | BODY MASS INDEX: 35.28 KG/M2 | WEIGHT: 224.8 LBS | RESPIRATION RATE: 16 BRPM

## 2022-12-15 DIAGNOSIS — M75.82 ROTATOR CUFF TENDINITIS, LEFT: Primary | ICD-10-CM

## 2022-12-15 PROCEDURE — 1090F PRES/ABSN URINE INCON ASSESS: CPT | Performed by: STUDENT IN AN ORGANIZED HEALTH CARE EDUCATION/TRAINING PROGRAM

## 2022-12-15 PROCEDURE — 20610 DRAIN/INJ JOINT/BURSA W/O US: CPT | Performed by: STUDENT IN AN ORGANIZED HEALTH CARE EDUCATION/TRAINING PROGRAM

## 2022-12-15 PROCEDURE — G8399 PT W/DXA RESULTS DOCUMENT: HCPCS | Performed by: STUDENT IN AN ORGANIZED HEALTH CARE EDUCATION/TRAINING PROGRAM

## 2022-12-15 PROCEDURE — 1036F TOBACCO NON-USER: CPT | Performed by: STUDENT IN AN ORGANIZED HEALTH CARE EDUCATION/TRAINING PROGRAM

## 2022-12-15 PROCEDURE — 99213 OFFICE O/P EST LOW 20 MIN: CPT | Performed by: STUDENT IN AN ORGANIZED HEALTH CARE EDUCATION/TRAINING PROGRAM

## 2022-12-15 PROCEDURE — 1123F ACP DISCUSS/DSCN MKR DOCD: CPT | Performed by: STUDENT IN AN ORGANIZED HEALTH CARE EDUCATION/TRAINING PROGRAM

## 2022-12-15 PROCEDURE — G8484 FLU IMMUNIZE NO ADMIN: HCPCS | Performed by: STUDENT IN AN ORGANIZED HEALTH CARE EDUCATION/TRAINING PROGRAM

## 2022-12-15 PROCEDURE — G8427 DOCREV CUR MEDS BY ELIG CLIN: HCPCS | Performed by: STUDENT IN AN ORGANIZED HEALTH CARE EDUCATION/TRAINING PROGRAM

## 2022-12-15 PROCEDURE — G8417 CALC BMI ABV UP PARAM F/U: HCPCS | Performed by: STUDENT IN AN ORGANIZED HEALTH CARE EDUCATION/TRAINING PROGRAM

## 2022-12-15 RX ORDER — LIDOCAINE HYDROCHLORIDE 10 MG/ML
4 INJECTION, SOLUTION INFILTRATION; PERINEURAL ONCE
Status: COMPLETED | OUTPATIENT
Start: 2022-12-15 | End: 2022-12-15

## 2022-12-15 RX ORDER — TRIAMCINOLONE ACETONIDE 40 MG/ML
40 INJECTION, SUSPENSION INTRA-ARTICULAR; INTRAMUSCULAR ONCE
Status: COMPLETED | OUTPATIENT
Start: 2022-12-15 | End: 2022-12-15

## 2022-12-15 RX ORDER — BUPIVACAINE HYDROCHLORIDE 2.5 MG/ML
4 INJECTION, SOLUTION INFILTRATION; PERINEURAL ONCE
Status: COMPLETED | OUTPATIENT
Start: 2022-12-15 | End: 2022-12-15

## 2022-12-15 RX ORDER — FAMOTIDINE 20 MG/1
20 TABLET, FILM COATED ORAL 2 TIMES DAILY PRN
COMMUNITY

## 2022-12-15 RX ADMIN — BUPIVACAINE HYDROCHLORIDE 10 MG: 2.5 INJECTION, SOLUTION INFILTRATION; PERINEURAL at 10:33

## 2022-12-15 RX ADMIN — TRIAMCINOLONE ACETONIDE 40 MG: 40 INJECTION, SUSPENSION INTRA-ARTICULAR; INTRAMUSCULAR at 10:34

## 2022-12-15 RX ADMIN — LIDOCAINE HYDROCHLORIDE 4 ML: 10 INJECTION, SOLUTION INFILTRATION; PERINEURAL at 10:33

## 2022-12-15 NOTE — PROGRESS NOTES
Subjective:      Patient ID: Anegla Lr is a 76 y.o. female who presents to the in the [address] [place] [address] for an initial evaluation of left shoulder pain. She states she has had discomfort off-and-on in the left shoulder for about 2 weeks. No history of injury. Pain with reaching and lifting. History of prior left rotator cuff repair approximately 5 years ago by Dr Ozzie Bruner. Pain Scale 7/10 VAS. Location of pain lateral deltoid region. Pain is worse with use of left arm. Pain improves with rest.   Previous treatments have included occasional ibuprofen with mild improvement. Review of Systems:  I have reviewed the clinically relevant past medical history, medications, allergies, family history, social history, and 13 point Review of Systems from the patient's recent history form & documented any details relevant to today's presenting complaints in the history above. The patient's self-reported past medical history, medications, allergies, family history, social history, and Review of Systems form from today's date have been scanned into the chart under the \"Media\" tab. As noted in the HPI. Negative for fever or chills. Positive for poly-joint pain, swelling and stiffness. Denies numbness or tingling.      Past Medical History:   Diagnosis Date    Allergic rhinitis     Bell's palsy 01/2005    Left    Cervical spondylosis     C5-7    Closed fracture of fifth metatarsal bone 03/2011    Left- displaced    Colorectal polyps     Essential hypertension     GERD (gastroesophageal reflux disease)      EGD 8/08- mild gastritis    Goiter     Multinodular- s/p thyroidectomy 7/04    Hyperlipidemia     Irregular uterine bleeding     Meningiomas, multiple (HCC)     Menorrhagia     Migraine headache     Osteoarthritis     Post-surgical hypothyroidism     Seizure disorder ([facility] [address]     Secondary to meningioma    Spinal stenosis, lumbar     L3-4, L4-5: moderately severe Tricuspid regurgitation     Trigger finger     Right    Vaginal prolapse     Venous insufficiency        Family History   Problem Relation Age of Onset    Asthma Mother     Heart Failure Mother          61    Prostate Cancer Father          70    Diabetes Maternal Grandmother         Type II    Glaucoma Maternal Grandmother     Rheum Arthritis Neg Hx     Osteoarthritis Neg Hx     Breast Cancer Neg Hx     Cancer Neg Hx     High Cholesterol Neg Hx     Hypertension Neg Hx     Migraines Neg Hx     Ovarian Cancer Neg Hx     Rashes/Skin Problems Neg Hx     Seizures Neg Hx     Stroke Neg Hx     Thyroid Disease Neg Hx        Past Surgical History:   Procedure Laterality Date    ARTHROPLASTY Left     4th toe    BLEPHAROPLASTY Bilateral     lower eyelid    BRAIN MENINGIOMA EXCISION  ,     CARPAL TUNNEL RELEASE Bilateral , ,     CHOLECYSTECTOMY  2002    EYE SURGERY Left 11/10/2020    REVISION LEFT ENDOSCOPIC DACRYOCYSTORHINOSTOMY/ DACRYOCYSTORHINOSTOMY WITH STENT PLACEMENT, LEFT SIDE     FINGER TRIGGER RELEASE Right     FINGER TRIGGER RELEASE Right 13    FOOT SURGERY      with screws placed     FOOT SURGERY Right 2018    OSTEOTOMY 1ST METATARSAL WITH BUNIONECTOMY    HAMMER TOE SURGERY Right 13    HYSTERECTOMY (CERVIX STATUS UNKNOWN)      still with ovaries    HYSTERECTOMY, TOTAL ABDOMINAL (CERVIX REMOVED)      Secondary to DUB    SHOULDER ARTHROSCOPY Left 13    THYROIDECTOMY      TOTAL KNEE ARTHROPLASTY Right        Social History     Occupational History    Occupation: Homemaker   Tobacco Use    Smoking status: Never    Smokeless tobacco: Never   Vaping Use    Vaping Use: Never used   Substance and Sexual Activity    Alcohol use: Yes     Comment: 1-2 glasses of wine per month    Drug use: No    Sexual activity: Yes     Partners: Male       Current Outpatient Medications   Medication Sig Dispense Refill    pantoprazole (PROTONIX) 40 MG tablet Take 1 tablet by mouth daily 90 tablet 1    methylPREDNISolone (MEDROL, FRANCOISE,) 4 MG tablet Take by mouth. 6 po day one 5 po day 2 4 po day 3 3 po day 4 2 po day 5 1 po day 6. (Patient not taking: Reported on 11/11/2022) 1 kit 0    tiZANidine (ZANAFLEX) 4 MG tablet Take 1 tablet by mouth 4 times daily as needed (spasm) (Patient not taking: Reported on 11/11/2022) 60 tablet 0    SYNTHROID 88 MCG tablet TAKE 1 TABLET DAILY 90 tablet 1    losartan (COZAAR) 50 MG tablet TAKE 1 TABLET DAILY 90 tablet 3    triamterene-hydroCHLOROthiazide (MAXZIDE-25) 37.5-25 MG per tablet Take 0.5 tablets by mouth daily 45 tablet 1    triamcinolone (KENALOG) 0.1 % ointment Apply topically nightly (Patient not taking: Reported on 11/11/2022) 30 g 3    mometasone (NASONEX) 50 MCG/ACT nasal spray 2 sprays by Nasal route daily 3 Inhaler 1    Aspirin-Acetaminophen-Caffeine (EXCEDRIN PO) Take by mouth as needed      Cyanocobalamin (VITAMIN B 12 PO) Take 500 mcg by mouth daily. levETIRAcetam (KEPPRA) 500 MG tablet Take 500 mg by mouth 2 times daily. Ascorbic Acid (VITAMIN C PO) Take 500 mg by mouth daily       aspirin 81 MG EC tablet Take 81 mg by mouth daily. Calcium Citrate-Vitamin D (CALCIUM CITRATE + D PO) Take 1 tablet by mouth 2 times daily (with meals). No current facility-administered medications for this visit. Objective:     She is alert, oriented x 3, pleasant, well nourished, developed and in no   acute distress. Ht 5' 7\" (1.702 m)   Wt 225 lb (102.1 kg)   BMI 35.24 kg/m²      Examination of the left shoulder: There is no deformity. There is no erythema or soft tissue swelling. No joint effusion. Deltoid region is  tender to palpation. AC Joint is not tender to palpation. Shoulder Active ROM -      IR to L5 ER w/ arm at side 30. Belly Press Test positive. Bear Hug Test positive. Drop Arm Test negative. Cross Arm Abduction Test negative.        Upper extremities:  She has 5/5 strength of her interosseous muscles, wrist dorsiflexors and volarflexors, biceps, triceps, deltoids, and internal and external rotators of her shoulders, bilaterally. Her biceps, triceps, bracheoradialis, quadriceps and achilles reflexes are 2+, bilaterally. Sensation is intact to light touchfrom C6 to C8. She has no clonus and negative Do's bilaterally. Examination of the upper extremities shows intact perfusion to all extremities. She has no cyanosis and digigts are warm to touch, capillary refill is less than 2 seconds. She has no edema noted. She has intact skin without lacerations or abrasions, no significant erythema, rashes or skin lesions. X Rays: were performed in the office today:   Left Shoulder X-Ray: AP, Y and Axillary views obtained and demonstrate :  Mild osteoarthritis of the glenohumeral joint. Subacromial space is well-preserved. Metallic anchor and screw noted in the proximal humeral head related to rotator cuff repair surgery. Additional Tests reviewed: None. Additional Outside Records reviewed: None. Diagnosis:       ICD-10-CM    1. Acute pain of left shoulder  M25.512 XR SHOULDER LEFT (MIN 2 VIEWS)      2. Rotator cuff tendinitis, left  M75.82            Assessment and Plan:       Assessment: This is a pleasant 77-year-old female who presents to the after-hours clinic this evening with approximately 2-week history of left shoulder pain, atraumatic in nature. Mild improvement with ice and ibuprofen. History of prior left rotator cuff repair approximately 5 years ago. X-rays show minimal arthritis. Most likely rotator cuff tendinitis or partial rotator cuff tear. I had an extensive discussion with Ms. Greco Fransisco regarding the natural history, etiology, and long term consequences of her condition. I have presented reasonable alternatives to the patient's proposed care, treatment, and services.   Risks and benefits of the treatment options also reviewed in detail. I have outlined a treatment plan with them. She has had full opportunity to ask her questions. I have answered them all to her satisfaction. I feel that Ms. Madan Angel understands our discussion today     Plan:  Medications-   OTC NSAIDS discussed. The most common side effects from NSAIDs are stomachaches, heartburn, and nausea. NSAIDs may irritate the stomach lining. If the medicine upsets your stomach, you can try taking it with food. But if that doesn't help, talk with your doctor to make sure it's not a more serious problem, such as a stomach ulcer or bleeding in the stomach or intestines. Using NSAIDs may:  Lead to high blood pressure. Make symptoms of heart failure worse. Raise the risk of heart attack, stroke, kidney damage, and skin reactions. Your risks are greater if you take NSAIDs at higher doses or for longer than the label says. People who are older than 72 or who have heart, stomach, or intestinal disease have a higher risk for problems. PT-   A home exercise program was instructed today including ROM exercises and strengthening exercises. The patient verbalized understanding of these exercises as well as the importance of the exercise program to promote return of normal function. If pain intensifies or other problems arise you are to notify the office. Discussed possible subacromial steroid injection. She would like to be seen this week if possible to discuss injection. Follow up- 1 week. Call or return to clinic if these symptoms worsen or fail to improve as anticipated. Felicia Beaver PA-C   Senior Physician Assistant   [facility]                                         Disclaimer: This note was generated with use of a verbal recognition program (DRAGON) and an attempt was made to check for errors.   It is possible that there are still dictated errors within this office note. If so, please bring any significant errors to my attention for an addendum. All efforts were made to ensure that this office note is accurate.

## 2022-12-15 NOTE — PROGRESS NOTES
CHIEF COMPLAINT: Left shoulder pain    History:    Leo Osman is a 76 y.o. right handed female referred by Mark Conti PA-C for evaluation and treatment of Left shoulder pain. This is evaluated as a personal injury. There is history of prior left rotator cuff repair 5 years ago with Dr. Poonam Villalobos. She was referred for a subacromial shoulder injection for rotator cuff tendonitis. The pain began 2 weeks ago. Pain is rated as a 9/10. There was not an injury. Pain is located lateral acromial.   There is pain with overhead activity, lifting, internal rotation, pulling and lying on her side. The patient has not had PT. The patient has not had an injection. The patient has tried NSAIDs which help. The patient has tried ice. Outside reports reviewed:  Franci Martinez note.     Past Medical History:   Diagnosis Date    Allergic rhinitis     Bell's palsy 01/2005    Left    Cervical spondylosis     C5-7    Closed fracture of fifth metatarsal bone 03/2011    Left- displaced    Colorectal polyps     Essential hypertension     GERD (gastroesophageal reflux disease)      EGD 8/08- mild gastritis    Goiter     Multinodular- s/p thyroidectomy 7/04    Hyperlipidemia     Irregular uterine bleeding     Meningiomas, multiple (HCC)     Menorrhagia     Migraine headache     Osteoarthritis     Post-surgical hypothyroidism     Seizure disorder ([facility] [address]     Secondary to meningioma    Spinal stenosis, lumbar     L3-4, L4-5: moderately severe    Tricuspid regurgitation     Trigger finger     Right    Vaginal prolapse     Venous insufficiency        Past Surgical History:   Procedure Laterality Date    ARTHROPLASTY Left 9/05    4th toe    BLEPHAROPLASTY Bilateral 5/06    lower eyelid    BRAIN MENINGIOMA EXCISION  1992, 2000    CARPAL TUNNEL RELEASE Bilateral 1991, 1993, 2003    CHOLECYSTECTOMY  2002    EYE SURGERY Left 11/10/2020    REVISION LEFT ENDOSCOPIC DACRYOCYSTORHINOSTOMY/ DACRYOCYSTORHINOSTOMY WITH STENT PLACEMENT, LEFT SIDE     FINGER TRIGGER RELEASE Right 4/07    FINGER TRIGGER RELEASE Right 8/6/13    FOOT SURGERY      with screws placed     FOOT SURGERY Right 09/05/2018    OSTEOTOMY 1ST METATARSAL WITH BUNIONECTOMY    HAMMER TOE SURGERY Right 5/1/13    HYSTERECTOMY (CERVIX STATUS UNKNOWN)      still with ovaries    HYSTERECTOMY, TOTAL ABDOMINAL (CERVIX REMOVED)  1983    Secondary to DUB    SHOULDER ARTHROSCOPY Left 9/24/13    THYROIDECTOMY  7/04    TOTAL KNEE ARTHROPLASTY Right 5/12       Current Outpatient Medications on File Prior to Visit   Medication Sig Dispense Refill    famotidine (PEPCID) 20 MG tablet Take 20 mg by mouth 2 times daily as needed      pantoprazole (PROTONIX) 40 MG tablet Take 1 tablet by mouth daily 90 tablet 1    SYNTHROID 88 MCG tablet TAKE 1 TABLET DAILY 90 tablet 1    losartan (COZAAR) 50 MG tablet TAKE 1 TABLET DAILY 90 tablet 3    triamcinolone (KENALOG) 0.1 % ointment Apply topically nightly 30 g 3    mometasone (NASONEX) 50 MCG/ACT nasal spray 2 sprays by Nasal route daily 3 Inhaler 1    Aspirin-Acetaminophen-Caffeine (EXCEDRIN PO) Take by mouth as needed      Cyanocobalamin (VITAMIN B 12 PO) Take 500 mcg by mouth daily. levETIRAcetam (KEPPRA) 500 MG tablet Take 500 mg by mouth 2 times daily. Ascorbic Acid (VITAMIN C PO) Take 500 mg by mouth daily       aspirin 81 MG EC tablet Take 81 mg by mouth daily. Calcium Citrate-Vitamin D (CALCIUM CITRATE + D PO) Take 1 tablet by mouth 2 times daily (with meals). No current facility-administered medications on file prior to visit.        Allergies   Allergen Reactions    Latex Anaphylaxis    Ondansetron     Sulfa Antibiotics      \"GAVE ME A TERRIBLE YEAST INFECTION\"       Social History     Socioeconomic History    Marital status:      Spouse name: Not on file    Number of children: Not on file    Years of education: Not on file    Highest education level: Not on file   Occupational History    Occupation: Homemaker   Tobacco Use Smoking status: Never    Smokeless tobacco: Never   Vaping Use    Vaping Use: Never used   Substance and Sexual Activity    Alcohol use: Yes     Comment: 1-2 glasses of wine per month    Drug use: No    Sexual activity: Yes     Partners: Male   Other Topics Concern    Not on file   Social History Narrative    Not on file     Social Determinants of Health     Financial Resource Strain: Low Risk     Difficulty of Paying Living Expenses: Not hard at all   Food Insecurity: No Food Insecurity    Worried About Running Out of Food in the Last Year: Never true    Ran Out of Food in the Last Year: Never true   Transportation Needs: Not on file   Physical Activity: Insufficiently Active    Days of Exercise per Week: 2 days    Minutes of Exercise per Session: 40 min   Stress: Not on file   Social Connections: Not on file   Intimate Partner Violence: Not on file   Housing Stability: Not on file       Family History   Problem Relation Age of Onset    Asthma Mother     Heart Failure Mother          61    Prostate Cancer Father          70    Diabetes Maternal Grandmother         Type II    Glaucoma Maternal Grandmother     Rheum Arthritis Neg Hx     Osteoarthritis Neg Hx     Breast Cancer Neg Hx     Cancer Neg Hx     High Cholesterol Neg Hx     Hypertension Neg Hx     Migraines Neg Hx     Ovarian Cancer Neg Hx     Rashes/Skin Problems Neg Hx     Seizures Neg Hx     Stroke Neg Hx     Thyroid Disease Neg Hx        Review of Systems:   I have reviewed the clinically relevant past medical history, medications, allergies, family history, social history, and 13 point Review of Systems from the patient's recent history form & documented any details relevant to today's presenting complaints in the history above. The patient's self-reported past medical history, medications, allergies, family history, social history, and Review of Systems form from 12/15/22 have been scanned into the chart under the \"Media\" tab.       Physical Examination:      Vital signs:    Vitals:    12/15/22 1006   Resp: 16        General:   alert, appears stated age, cooperative, and no distress   Left Shoulder   Active ROM:   forward flexion 140, external rotation 45, internal rotation L5. Right shoulder: forward flexion 180, external rotation 85, internal rotation L1. Passive ROM:  forward flexion 180   Right shoulder: forward flexion 180   Joint Tenderness:   lateral acromial   Neer:   positive   Ruvalcaba:   positive   Strength:   5/5 supraspinatus, ER, IR    Right shoulder: 5/5 supraspinatus, ER, IR    Drop-arm test:   negative   Belly-press test:   positive   Bear-hug test:   negative   Speed's test:   negative   Bicipital groove tenderness:  negative   Montelongo's test:   negative   Cross-body adduction test:   negative    AC joint tenderness:   negative   Scapular dyskinesis:   absent  Right shoulder: absent   Atrophy:   none noted     Right shoulder: none noted      There are no skin lesions, cellulitis, or extreme edema in the upper extremities. Sensation is grossly intact to light touch bilaterally upper extremity. The patient has warm and well-perfused bilateral upper extremities with brisk capillary refill. Imaging   Left Shoulder X-Ray: 3 views reviewed: Mild osteoarthritis of the glenohumeral joint. Subacromial space is well-preserved. Metallic anchor and screw noted in the proximal humeral head related to rotator cuff repair surgery. Assessment:      Left shoulder rotator cuff tendonitis       Plan:      Natural history and expected course discussed. Questions answered. We discussed a trial of physical therapy and an injection. She elected to proceed. PT referral provided. The risks and benefits of an injection were discussed with the patient. The patient had full opportunity to ask questions and all were answered. The patient then provided verbal informed consent.   The skin was then prepped with betadine solution and alcohol. Under aseptic conditions, the  left subacromial space was injected with 4cc of 1% xylocaine, 4cc of 0.25% marcaine, and 1cc of Kenalog (40mg/ml). There were no immediate complications following the injection. The patient was advised of the possibility of injection site reaction and instructed to apply ice to the area and take NSAIDs if able. Follow up 6-8 weeks. Iris Izquierdo PA-C  Board Certified by the [employer] on Certification of [employer] [employer] and [address]: This note was generated with use of a verbal recognition program and an attempt was made to check for errors. It is possible that there are still dictated errors within this office note. If so, please bring any significant errors to my attention for an addendum. All efforts were made to ensure that this office note is accurate.

## 2022-12-20 ENCOUNTER — HOSPITAL ENCOUNTER (OUTPATIENT)
Dept: PHYSICAL THERAPY | Age: 77
Setting detail: THERAPIES SERIES
Discharge: HOME | End: 2022-12-20
Payer: MEDICARE

## 2022-12-20 PROCEDURE — 97112 NEUROMUSCULAR REEDUCATION: CPT

## 2022-12-20 PROCEDURE — 97110 THERAPEUTIC EXERCISES: CPT

## 2022-12-20 NOTE — PLAN OF CARE
[facility]  Phone: [phone]   Fax: [phone]  Physical Therapy Re-Certification Plan of Care    Dear Tere Diana MD  ,    We had the pleasure of treating the following patient for physical therapy services at [facility]. A summary of our findings can be found in the updated assessment below. This includes our plan of care. If you have any questions or concerns regarding these findings, please do not hesitate to contact me at the office phone number checked above. Thank you for the referral.     Physician Signature:________________________________Date:__________________  By signing above (or electronic signature), therapist's plan is approved by physician      Functional Outcome:     FOTO: 61    Flexibility / ROM LEFT RIGHT Comments   Hamstrings WNL WFL     Piriformis Mod  impaired Min impaired     Quads Max impaired  (~60 deg flex) Max impaired   (~65 deg flex)                         MMT         Hip flexion (L1-L2) 5 5     Hip abduction 4- 4- Compensation to hip flex B/L    Hip extension 3 3+     Knee extension (L2-L4) 4+ 5     Knee flexion 4 5     Ankle Dorsiflexion (L4-L5) 5 5     Ankle Plantar flexion (S1-S2)         Ankle Eversion (S1-S2)         Great Toe Ext (L5)            Overall Response to Treatment:   []Patient is responding well to treatment and improvement is noted with regards  to goals   []Patient should continue to improve in reasonable time if they continue HEP   []Patient has plateaued and is no longer responding to skilled PT intervention    []Patient is getting worse and would benefit from return to referring MD   []Patient unable to adhere to initial POC   [x]Other: Pt has been seen for a total of 7 visits focusing on balance and LE strength. She has met 2/5 LTGs at this time. She continues to demonstrate moderate weakness in her B hips and decreased quad flexibility.  She is ambulating with more upright posture since beginning therapy. Pt to benefit from continued therapy to address remaining deficits. GOALS:   Patient stated goal: improve walking  [] Progressing: [] Met: [] Not Met: [] Adjusted    Therapist goals for Patient:   Short Term Goals: To be achieved in: 2 weeks  1. Independent in HEP and progression per patient tolerance, in order to prevent re-injury. [] Progressing: [x] Met: [] Not Met: [] Adjusted  2. Patient will have a decrease in pain to facilitate improvement in movement, function, and ADLs as indicated by Functional Deficits. [] Progressing: [x] Met: [] Not Met: [] Adjusted    Long Term Goals: To be achieved in: 4 weeks  1. Patient to demonstrate FGA score to 22/30  to demonstrate improved fall risk to assist with reaching prior level of function. [] Progressing: [x] Met: [] Not Met: [] Adjusted  2. FOTO score of at least 52 to assist with reaching prior level of function. [] Progressing: [x] Met: [] Not Met: [] Adjusted  3. Patient will demonstrate an increase in B hip abd & ext strength to 4-/5 in order to improve confidence with prolonged gait. [x] Progressing: [] Met: [] Not Met: [] Adjusted  4. Patient to demonstrate increased reps to 11 for 30\" STS, with good eccentric control, in order to improve community access. [x] Progressing: [] Met: [] Not Met: [] Adjusted  5. Patient to improve B quad length for active knee flexion to 95 deg while prone in order to improve balance immediately after sit to stand transfer. [x] Progressing: [] Met: [] Not Met: [] Adjusted     Date range of Visits: 22 - 22  Total Visits: 7    Recommendation:    [x]Continue PT 2x / wk for 3 weeks in addition to current POC if needed               []Hold PT, pending MD visit      Physical Therapy Daily Treatment Note    Date:  2022     Patient Name:  Bharati Haddad    :  1945  MRN: [id number]  Medical Diagnosis:   At high risk for falls [Z91.81]  Treatment Diagnosis: Decreased B hip & L knee strength, Impaired balance and gait     Insurance/Certification information:  PT Insurance Information: Medicare. Secondary:   Physician Information:  Latrice Valenzuela MD    Plan of care signed (Y/N): [x]  Yes []  No     Date of Patient follow up with Physician:      Progress Report: [x]  Yes  []  No     Date Range for reporting period:  Beginnin2022 - signed  POC: 22  Ending:       Progress report due (10 Rx/or 30 days whichever is less): visit #10 or / (date)     Recertification due (POC duration/ or 90 days whichever is less): visit #12 or 23 (date)     Visit # Insurance Allowable Auth required? Date Range    mn []  Yes  [x]  No pcy       Latex Allergy:  [x]NO      []YES  Preferred Language for Healthcare:   [x]English       []Other:        Functional Scale:                                                                                                      Date assessed:  FOTO physical FS primary measure score = 46; risk adjusted = 50                    FOTO physical FS primary measure score = 63           Functional Scale/Test Evaluation 2022 30 day   22 60 day  Discharge    Tinetti Assessment        30\" STS 8, decr ecc control 10 w/ UEs  10 No UEs      FGA            Pain level:  0/10  Location:  knee    SUBJECTIVE:  Pt 's daughter told her she is walking a lot better and her therapy is working, that made pt feel good. Would like to put therapy on hold until after Lonny.      OBJECTIVE: : Trendelenburg noted B/L, pt not using any AD  : Pt brought walking stick today  : POC completed this date, see above      RESTRICTIONS/PRECAUTIONS: Brain surgery in , R TKA , HTN, seizure disorder but is well controlled w/medication    Interventions/Exercises:   Therapeutic Exercises (76576) Resistance / level Sets/sec Reps Notes   Seated stepper     IB - gastroc  HR 2x30\"  30\"       HSS  x30\" B     HFS  X30\" B     TM -retro  -side stepping       Total gym       Mat table- stretches to perform before getting out of bed in the morning   - LTR  - SKTC  - Bridge  - Seated EOM lumbar flexion                  Assessment for POC: flexibility, MMT, as well as issuing HEP for both LE flexibility and shoulder  12/20                    Neuromuscular Re-ed (35325)        AirEx  - DLS  - NBOS: EO and EC  - Tandem       NO UE Support  Fingertip support for EC  Fingertip support B    Shuttle balance       3 way SLR stance leg on Airex  Squats on AirEx  Marching on AirEx   B UE support B UE support 2 fingertip support    Walking on gray mats  - fwd  - side stepping  - retro 2 mats    No UE suport          Assessment for POC - FOTO, FGA 12/20                    Therapeutic Activities (90722) /  Functional Tasks / Gait Training (06780)       Step ups - forward  6 inch  1 UE support   Step ups - lateral 6 inch  1 UE support    Sit to stand transitions 22\"  20\"  No UE support      Fwd stepping over tosha w/ weight shift 1 tosha 1 UE support                  Gait train w/walking stick   12/13: Used in L hand, practiced ambulation with 3 point pattern (stick, R LE, L LE), also encouraged pt to keep more space between feet                         Manual Intervention (22097)       Quad stretch  - S/L  Piriformis stretch  -supine                                             Modalities:     Pt. Education:  11/16/2022 patient educated on diagnosis, prognosis and expectations for rehab, all patient questions were answered    HEP instruction:  Access Code: [email or web address]  URL: [email or web address]  Date: 11/16/2022  Prepared by: Delroy Soto     Exercises  Seated Piriformis Stretch - 3 x daily - 7 x weekly - 3 sets - 30\" hold  Bridge - 2 x daily - 7 x weekly - 1 sets - 10 reps  Standing Hip Abduction with Anterior Support - 2 x daily - 7 x weekly - 1 sets - 10 reps  Sit to Stand with Armchair - 2 x daily - 7 x weekly - 1 sets - 10 reps    Access Code: [email or web address]  URL: [email or web address]  Date: 11/30/2022  Prepared by: Freeland Bonita    Exercises  Supine Lower Trunk Rotation - 1 x daily - 7 x weekly - 1 sets - 10 reps  Hooklying Single Knee to Chest Stretch - 1 x daily - 7 x weekly - 1 sets - 10 reps  Supine Bridge - 1 x daily - 7 x weekly - 1 sets - 10 reps  Seated Flexion Stretch - 1 x daily - 7 x weekly - 1 sets - 5 reps    Access Code: [email or web address]  URL: [email or web address]  Date: 12/20/2022 - issued for pt following shoulder injection  Prepared by: Freeland Bonita    Exercises  Prone Quadriceps Stretch with Strap - 2 x daily - 7 x weekly - 3 reps - 30 seconds hold  Seated Scapular Retraction - 1 x daily - 7 x weekly - 2 sets - 10 reps  Shoulder Flexion Wall Slide with Towel - 1 x daily - 7 x weekly - 2 sets - 10 reps  Scaption Wall Slide with Towel - 1 x daily - 7 x weekly - 2 sets - 10 reps  Seated Shoulder Flexion - 1 x daily - 7 x weekly - 2 sets - 10 reps  Seated Shoulder Abduction - Palms Down - 1 x daily - 7 x weekly - 2 sets - 10 reps  Doorway Pec Stretch at 60 Elevation - 1 x daily - 7 x weekly - 1 sets - 3 reps - 30 seconds hold      Therapeutic Exercise and NMR EXR  [x] (32932) Provided verbal/tactile cueing for activities related to strengthening, flexibility, endurance, ROM for improvements in  [x] LE / Core stability: LE, hip, and core control with self care, mobility, lifting, ambulation. [] UE / Shoulder complex: cervical, postural, scapular, scapulothoracic and UE control with self care, reaching, carrying, lifting, house/yardwork, driving, computer work. [x] (95872) Provided verbal/tactile cueing for activities related to improving balance, coordination, kinesthetic sense, posture, motor skill, proprioception to assist with   [x] LE / Core stability: LE, hip, and core control in self care, mobility, lifting, ambulation and eccentric single leg control.    [] UE / Shoulder complex: cervical, scapular, scapulothoracic and UE control with self care, reaching, carrying, lifting, house/yardwork, driving, computer work.   [] (64610) Therapist is in constant attendance of 2 or more patients providing skilled therapy interventions, but not providing any significant amount of measurable one-on-one time to either patient, for improvements in  [] LE / Core stability: LE, hip, and core control in self care, mobility, lifting, ambulation and eccentric single leg control. [] UE / Shoulder complex: cervical, scapular, scapulothoracic and UE control with self care, reaching, carrying, lifting, house/yardwork, driving, computer work. NMR and Therapeutic Activities:    [x] (66184 or 08971) Provided verbal/tactile cueing for activities related to improving balance, coordination, kinesthetic sense, posture, motor skill, proprioception and motor activation to allow for proper function of   [x] LE / Core, hip and LE with self care and ADLs  [] UE / Shoulder complex: cervical, postural, scapular, scapulothoracic and UE control with self care, carrying, lifting, driving, computer work. [x] (22643) Gait Re-education- Provided training and instruction to the patient for proper LE, core and hip recruitment, positioning, and eccentric body weight control with ambulation re-education, including ascending & descending stairs     Home Management Training / Self Care:  [] (53994) Provided self-care/home management training related to activities of daily living and compensatory training, and/or use of adaptive equipment for improvement with: ADLs and compensatory training, meal preparation, safety procedures and instruction in use of adaptive equipment, including bathing, grooming, dressing, personal hygiene, basic household cleaning and chores.        Home Exercise Program:    [] (39392) Reviewed/Progressed HEP activities related to strengthening, flexibility, endurance, ROM of   [] LE / Core stability: core, hip and LE for functional self-care, mobility, lifting and ambulation/stair navigation   [] UE / Shoulder complex: cervical, postural, scapular, scapulothoracic and UE control with self care, reaching, carrying, lifting, house/yardwork, driving, computer work  [] (65034)Reviewed/Progressed HEP activities related to improving balance, coordination, kinesthetic sense, posture, motor skill, proprioception of   [] LE: core, hip and LE for self care, mobility, lifting, and ambulation/stair navigation    [] UE / Shoulder complex: cervical, postural,  scapular, scapulothoracic and UE control with self care, reaching, carrying, lifting, house/yardwork, driving, computer work    Manual Treatments:  PROM / STM / Oscillations-Mobs:  G-I, II, III, IV (PA's, Inf., Post.)  [] (85529) Provided manual therapy to mobilize shoulder complex, hip, LE, and/or cervicothoracic/LS spine soft tissue/joints for the purpose of modulating pain, promoting relaxation,  increasing ROM, reducing/eliminating soft tissue swelling/inflammation/restriction, improving soft tissue extensibility and allowing for proper ROM for normal function with   [] LE / Core stability: self care, mobility, lifting and ambulation. [] UE / Shoulder complex: self care, reaching, carrying, lifting, house/yardwork, driving, computer work. Modalities:  [] (41870) Vasopneumatic compression: Utilized vasopneumatic compression to decrease edema / swelling for the purpose of improving mobility and quad tone / recruitment which will allow for increased overall function including but not limited to self-care, transfers, ambulation, and ascending / descending stairs.          Charges:  Timed Code Treatment Minutes: 50   Total Treatment Minutes: 50     [] EVAL - LOW (44155)   [] EVAL - MOD (28855)  [] EVAL - HIGH (07992)  [] RE-EVAL (01149)  [x] TE (57104) x 2    [] Ionto  [x] NMR (52852) x  1    [] Vaso  [] Manual (84143) x      [] Ultrasound  [] TA x  1     [] [facility] Traction (52207)  [] Gait Training x    [] ES (un) (02268):   [] Aquatic therapy x   [] Other:   [] Group:     Overall Progression Towards Functional goals/ Treatment Progress Update:  [] Patient is progressing as expected towards functional goals listed. [] Progression is slowed due to complexities/Impairments listed. [] Progression has been slowed due to co-morbidities. [x] Plan just implemented, too soon to assess goals progression <30days   [] Goals require adjustment due to lack of progress  [] Patient is not progressing as expected and requires additional follow up with physician  [] Other    Persisting Functional Limitations/Impairments:  []Sleeping []Sitting               []Standing [x]Transfers        [x]Walking []Kneeling               [x]Stairs [x]Squatting / bending   []ADLs []Reaching  [x]Lifting  []Housework  []Driving []Job related tasks  []Sports/Recreation []Other:        ASSESSMENT: POC completed - see above     Treatment/Activity Tolerance:  [x] Patient able to complete tx [] Patient limited by fatigue  [] Patient limited by pain  [] Patient limited by other medical complications  [] Other:     Prognosis: [x] Good [] Fair  [] Poor    Patient Requires Follow-up: [x] Yes  [] No    Plan for next treatment session: quad flexiblity, lumbar extension, hip strength, STAIRS     PLAN: See [facility] PT 2x / week for 6 weeks. [x] Continue per plan of care [] Alter current plan (see comments)  [] Plan of care initiated [] Hold pending MD visit [] Discharge    Electronically signed by:   Maryanne Sherman PT DPT    Note: If patient does not return for scheduled/ recommended follow up visits, his note will serve as a discharge from care along with most recent update on progress.

## 2022-12-22 ENCOUNTER — APPOINTMENT (OUTPATIENT)
Dept: PHYSICAL THERAPY | Age: 77
End: 2022-12-22
Payer: MEDICARE

## 2022-12-27 ENCOUNTER — HOSPITAL ENCOUNTER (OUTPATIENT)
Dept: PHYSICAL THERAPY | Age: 77
Setting detail: THERAPIES SERIES
Discharge: HOME | End: 2022-12-27
Payer: MEDICARE

## 2022-12-27 PROCEDURE — 97110 THERAPEUTIC EXERCISES: CPT

## 2022-12-27 PROCEDURE — 97116 GAIT TRAINING THERAPY: CPT

## 2022-12-27 PROCEDURE — 97112 NEUROMUSCULAR REEDUCATION: CPT

## 2022-12-27 NOTE — FLOWSHEET NOTE
[address] [facility]  Phone: [phone]   Fax: [phone]    Physical Therapy Daily Treatment Note    Date:  2022     Patient Name:  Ishmael Araujo    :  1945  MRN: [id number]  Medical Diagnosis: At high risk for falls [Z91.81]  Treatment Diagnosis: Decreased B hip & L knee strength, Impaired balance and gait     Insurance/Certification information:  PT Insurance Information: Medicare. Secondary:   Physician Information:  Lisa Carter MD    Plan of care signed (Y/N): [x]  Yes []  No     Date of Patient follow up with Physician:      Progress Report: []  Yes  [x]  No     Date Range for reporting period:  Beginnin2022 - signed  POC: 22 - signed  Ending:       Progress report due (10 Rx/or 30 days whichever is less): visit #10 or  (date)     Recertification due (POC duration/ or 90 days whichever is less): visit #12 or 23 (date)     Visit # Insurance Allowable Auth required? Date Range    mn []  Yes  [x]  No pcy       Latex Allergy:  [x]NO      []YES  Preferred Language for Healthcare:   [x]English       []Other:        Functional Scale:                                                                                                      Date assessed:  FOTO physical FS primary measure score = 46; risk adjusted = 50                    FOTO physical FS primary measure score = 63           Functional Scale/Test Evaluation 2022 30 day   22 60 day  Discharge    Tinetti Assessment        30\" STS 8, decr ecc control 10 w/ UEs  10 No UEs      FGA            Pain level:  0/10  Location:  knee    SUBJECTIVE:  Pt reports she had a busy [place], but it as good. Over the weekend she felt like she was limping a little.      OBJECTIVE: : Trendelenburg noted B/L, pt not using any AD  : Pt brought walking stick today  : POC completed this date, see above     Flexibility / ROM LEFT RIGHT Comments   Hamstrings WNL WFL     Piriformis Mod  impaired Min impaired     Quads Max impaired  (~60 deg flex) Max impaired   (~65 deg flex)                         MMT         Hip flexion (L1-L2) 5 5     Hip abduction 4- 4- Compensation to hip flex B/L    Hip extension 3 3+     Knee extension (L2-L4) 4+ 5     Knee flexion 4 5     Ankle Dorsiflexion (L4-L5) 5 5     Ankle Plantar flexion (S1-S2)         Ankle Eversion (S1-S2)         Great Toe Ext (L5)              RESTRICTIONS/PRECAUTIONS: Brain surgery in 2001, R TKA 2007, HTN, seizure disorder but is well controlled w/medication    Interventions/Exercises:   Therapeutic Exercises (80637) Resistance / level Sets/sec Reps Notes   Seated stepper     IB - gastroc  HR  2x30\"  30\"       HSS  2x30\" B     HFS  2X30\" B     TM -retro  -side stepping       Total gym       Mat table- stretches to perform before getting out of bed in the morning   - LTR  - SKTC  - Bridge  - Seated EOM lumbar flexion           Glider - retro   10 B    Glider - lateral    10 B Small ROM                 Neuromuscular Re-ed (93910)        AirEx  - DLS  - NBOS: EO and EC  - Tandem       NO UE Support  Fingertip support for EC  Fingertip support B    Shuttle balance       3 way SLR stance leg on Airex  Squats on AirEx  Marching on AirEx      1 min  10   B UE support B UE support 2 fingertip support    Walking on gray mats  - fwd  - side stepping  - retro 2 mats    2 laps  2 laps  2 laps No UE suport                               Therapeutic Activities (20187) /  Functional Tasks / Gait Training (55845)       Step ups - forward  6 inch  15 B 1 UE support   Step ups - lateral 6 inch  15 B 1 UE support    Sit to stand transitions 22\"  20\"  No UE support      Fwd stepping over tosha w/ weight shift 1 tosha 1 UE support                  Gait train w/walking stick   12/13: Used in L hand, practiced ambulation with 3 point pattern (stick, R LE, L LE), also encouraged pt to keep more space between feet                         Manual Intervention (40314)       Quad stretch  - S/L  Piriformis stretch  -supine                                             Modalities:     Pt. Education:  11/16/2022 patient educated on diagnosis, prognosis and expectations for rehab, all patient questions were answered    HEP instruction:  Access Code: [email or web address]  URL: [email or web address]  Date: 11/16/2022  Prepared by: Lori Cobb     Exercises  Seated Piriformis Stretch - 3 x daily - 7 x weekly - 3 sets - 30\" hold  Bridge - 2 x daily - 7 x weekly - 1 sets - 10 reps  Standing Hip Abduction with Anterior Support - 2 x daily - 7 x weekly - 1 sets - 10 reps  Sit to Stand with Armchair - 2 x daily - 7 x weekly - 1 sets - 10 reps    Access Code: [email or web address]  URL: [employer]/  Date: 11/30/2022  Prepared by: Tati Ureña    Exercises  Supine Lower Trunk Rotation - 1 x daily - 7 x weekly - 1 sets - 10 reps  Hooklying Single Knee to Chest Stretch - 1 x daily - 7 x weekly - 1 sets - 10 reps  Supine Bridge - 1 x daily - 7 x weekly - 1 sets - 10 reps  Seated Flexion Stretch - 1 x daily - 7 x weekly - 1 sets - 5 reps    Access Code: [email or web address]  URL: [email or web address]  Date: 12/20/2022 - issued for pt following shoulder injection  Prepared by: Tati Ureña    Exercises  Prone Quadriceps Stretch with Strap - 2 x daily - 7 x weekly - 3 reps - 30 seconds hold  Seated Scapular Retraction - 1 x daily - 7 x weekly - 2 sets - 10 reps  Shoulder Flexion Wall Slide with Towel - 1 x daily - 7 x weekly - 2 sets - 10 reps  Scaption Wall Slide with Towel - 1 x daily - 7 x weekly - 2 sets - 10 reps  Seated Shoulder Flexion - 1 x daily - 7 x weekly - 2 sets - 10 reps  Seated Shoulder Abduction - Palms Down - 1 x daily - 7 x weekly - 2 sets - 10 reps  Doorway Pec Stretch at 60 Elevation - 1 x daily - 7 x weekly - 1 sets - 3 reps - 30 seconds hold      Therapeutic Exercise and NMR EXR  [x] (13223) Provided verbal/tactile cueing for activities related to strengthening, flexibility, endurance, ROM for improvements in  [x] LE / Core stability: LE, hip, and core control with self care, mobility, lifting, ambulation. [] UE / Shoulder complex: cervical, postural, scapular, scapulothoracic and UE control with self care, reaching, carrying, lifting, house/yardwork, driving, computer work. [x] (34228) Provided verbal/tactile cueing for activities related to improving balance, coordination, kinesthetic sense, posture, motor skill, proprioception to assist with   [x] LE / Core stability: LE, hip, and core control in self care, mobility, lifting, ambulation and eccentric single leg control. [] UE / Shoulder complex: cervical, scapular, scapulothoracic and UE control with self care, reaching, carrying, lifting, house/yardwork, driving, computer work.   [] (39543) Therapist is in constant attendance of 2 or more patients providing skilled therapy interventions, but not providing any significant amount of measurable one-on-one time to either patient, for improvements in  [] LE / Core stability: LE, hip, and core control in self care, mobility, lifting, ambulation and eccentric single leg control. [] UE / Shoulder complex: cervical, scapular, scapulothoracic and UE control with self care, reaching, carrying, lifting, house/yardwork, driving, computer work. NMR and Therapeutic Activities:    [x] (42670 or 90352) Provided verbal/tactile cueing for activities related to improving balance, coordination, kinesthetic sense, posture, motor skill, proprioception and motor activation to allow for proper function of   [x] LE / Core, hip and LE with self care and ADLs  [] UE / Shoulder complex: cervical, postural, scapular, scapulothoracic and UE control with self care, carrying, lifting, driving, computer work.    [x] (22691) Gait Re-education- Provided training and instruction to the patient for proper LE, core and hip recruitment, positioning, and eccentric body weight control with ambulation re-education, including ascending & descending stairs     Home Management Training / Self Care:  [] (37558) Provided self-care/home management training related to activities of daily living and compensatory training, and/or use of adaptive equipment for improvement with: ADLs and compensatory training, meal preparation, safety procedures and instruction in use of adaptive equipment, including bathing, grooming, dressing, personal hygiene, basic household cleaning and chores. Home Exercise Program:    [] (39688) Reviewed/Progressed HEP activities related to strengthening, flexibility, endurance, ROM of   [] LE / Core stability: core, hip and LE for functional self-care, mobility, lifting and ambulation/stair navigation   [] UE / Shoulder complex: cervical, postural, scapular, scapulothoracic and UE control with self care, reaching, carrying, lifting, house/yardwork, driving, computer work  [] (03295)Reviewed/Progressed HEP activities related to improving balance, coordination, kinesthetic sense, posture, motor skill, proprioception of   [] LE: core, hip and LE for self care, mobility, lifting, and ambulation/stair navigation    [] UE / Shoulder complex: cervical, postural,  scapular, scapulothoracic and UE control with self care, reaching, carrying, lifting, house/yardwork, driving, computer work    Manual Treatments:  PROM / STM / Oscillations-Mobs:  G-I, II, III, IV (PA's, Inf., Post.)  [] (35933) Provided manual therapy to mobilize shoulder complex, hip, LE, and/or cervicothoracic/LS spine soft tissue/joints for the purpose of modulating pain, promoting relaxation,  increasing ROM, reducing/eliminating soft tissue swelling/inflammation/restriction, improving soft tissue extensibility and allowing for proper ROM for normal function with   [] LE / Core stability: self care, mobility, lifting and ambulation.     [] UE / Shoulder complex: self care, reaching, carrying, lifting, house/yardwork, driving, computer work. Modalities:  [] (61946) Vasopneumatic compression: Utilized vasopneumatic compression to decrease edema / swelling for the purpose of improving mobility and quad tone / recruitment which will allow for increased overall function including but not limited to self-care, transfers, ambulation, and ascending / descending stairs. Charges:  Timed Code Treatment Minutes: 40   Total Treatment Minutes: 40     [] EVAL - LOW (01845)   [] EVAL - MOD (68505)  [] EVAL - HIGH (38046)  [] RE-EVAL (56884)  [x] TE (63250) x 1    [] Ionto  [x] NMR (64010) x  1    [] Vaso  [] Manual (59252) x      [] Ultrasound  [] TA x  1     [] Mech Traction (34975)  [x] Gait Training x 1   [] ES (un) (70873):   [] Aquatic therapy x   [] Other:   [] Group:       GOALS:   Patient stated goal: improve walking  [] Progressing: [] Met: [] Not Met: [] Adjusted    Therapist goals for Patient:   Short Term Goals: To be achieved in: 2 weeks  1. Independent in HEP and progression per patient tolerance, in order to prevent re-injury. [] Progressing: [x] Met: [] Not Met: [] Adjusted  2. Patient will have a decrease in pain to facilitate improvement in movement, function, and ADLs as indicated by Functional Deficits. [] Progressing: [x] Met: [] Not Met: [] Adjusted    Long Term Goals: To be achieved in: 4 weeks  1. Patient to demonstrate FGA score to 22/30  to demonstrate improved fall risk to assist with reaching prior level of function. [] Progressing: [x] Met: [] Not Met: [] Adjusted  2. FOTO score of at least 52 to assist with reaching prior level of function. [] Progressing: [x] Met: [] Not Met: [] Adjusted  3. Patient will demonstrate an increase in B hip abd & ext strength to 4-/5 in order to improve confidence with prolonged gait. [x] Progressing: [] Met: [] Not Met: [] Adjusted  4.  Patient to demonstrate increased reps to 11 for 30\" STS, with good eccentric control, in order to improve community access. [x] Progressing: [] Met: [] Not Met: [] Adjusted  5. Patient to improve B quad length for active knee flexion to 95 deg while prone in order to improve balance immediately after sit to stand transfer. [x] Progressing: [] Met: [] Not Met: [] Adjusted       Overall Progression Towards Functional goals/ Treatment Progress Update:  [] Patient is progressing as expected towards functional goals listed. [] Progression is slowed due to complexities/Impairments listed. [] Progression has been slowed due to co-morbidities. [x] Plan just implemented, too soon to assess goals progression <30days   [] Goals require adjustment due to lack of progress  [] Patient is not progressing as expected and requires additional follow up with physician  [] Other    Persisting Functional Limitations/Impairments:  []Sleeping []Sitting               []Standing [x]Transfers        [x]Walking []Kneeling               [x]Stairs [x]Squatting / bending   []ADLs []Reaching  [x]Lifting  []Housework  []Driving []Job related tasks  []Sports/Recreation []Other:        ASSESSMENT: Pt more stiff this date, more time spent on stretching, due to activity from the holiday weekend. Will likely DC from therapy for balance and transition to therapy for her shoulder. Treatment/Activity Tolerance:  [x] Patient able to complete tx [] Patient limited by fatigue  [] Patient limited by pain  [] Patient limited by other medical complications  [] Other:     Prognosis: [x] Good [] Fair  [] Poor    Patient Requires Follow-up: [x] Yes  [] No    Plan for next treatment session: quad flexiblity, lumbar extension, hip strength, STAIRS     PLAN: See jason. PT 2x / week for 6 weeks.    [x] Continue per plan of care [] Alter current plan (see comments)  [] Plan of care initiated [] Hold pending MD visit [] Discharge    Electronically signed by:   Richard President, PT DPT    Note: If patient does not return for scheduled/ recommended follow up visits, his note will serve as a discharge from care along with most recent update on progress.

## 2022-12-28 RX ORDER — MOMETASONE FUROATE 50 UG/1
2 SPRAY, METERED NASAL DAILY
Qty: 3 EACH | Refills: 1 | Status: SHIPPED | OUTPATIENT
Start: 2022-12-28

## 2022-12-28 RX ORDER — PANTOPRAZOLE SODIUM 40 MG/1
40 TABLET, DELAYED RELEASE ORAL DAILY
Qty: 90 TABLET | Refills: 1 | Status: SHIPPED | OUTPATIENT
Start: 2022-12-28

## 2022-12-30 ENCOUNTER — HOSPITAL ENCOUNTER (OUTPATIENT)
Dept: PHYSICAL THERAPY | Age: 77
Setting detail: THERAPIES SERIES
Discharge: HOME | End: 2022-12-30
Payer: MEDICARE

## 2022-12-30 PROCEDURE — 97110 THERAPEUTIC EXERCISES: CPT

## 2022-12-30 NOTE — PLAN OF CARE
[facility] Physical Therapy  Phone: [phone]   Fax: [phone]   Physical Therapy Discharge Summary    Dear So Fonseca MD  ,    We had the pleasure of treating the following patient for physical therapy services at [facility]. A summary of our findings can be found in the discharge summary below. If you have any questions or concerns regarding these findings, please do not hesitate to contact me at the office phone number checked above. Thank you for the referral.     Physician Signature:________________________________Date:__________________  By signing above (or electronic signature), therapists plan is approved by physician      Functional Outcome:     FOTO: 63    Flexibility / ROM LEFT RIGHT Comments   Hamstrings WNL WFL     Piriformis Mod  impaired Min impaired     Quads 85 deg flexion 82 deg flexion                         MMT         Hip flexion (L1-L2) 5 5     Hip abduction 4- 4- Compensation to hip flex B/L    Hip extension 4- 4-     Knee extension (L2-L4) 4+ 5     Knee flexion 4 5     Ankle Dorsiflexion (L4-L5) 5 5     Ankle Plantar flexion (S1-S2)         Ankle Eversion (S1-S2)         Great Toe Ext (L5)            Overall Response to Treatment:   [x]Patient is responding well to treatment and improvement is noted with regards  to goals   []Patient should continue to improve in reasonable time if they continue HEP   []Patient has plateaued and is no longer responding to skilled PT intervention    []Patient is getting worse and would benefit from return to referring MD   []Patient unable to adhere to initial POC   [x]Other: Pt has done very well with PT intervention for her balance, meeting 4/5 LTGs. Pt still demonstrating decreased LE flexibility. PT and pt have decided to discharge for now and pt can continue with HEP for balance and LE strengthening on her own.      GOALS:   Patient stated goal: improve walking  [] Progressing: [] Met: [] Not Met: [] Adjusted    Therapist goals for Patient:   Short Term Goals: To be achieved in: 2 weeks  1. Independent in HEP and progression per patient tolerance, in order to prevent re-injury. [] Progressing: [x] Met: [] Not Met: [] Adjusted  2. Patient will have a decrease in pain to facilitate improvement in movement, function, and ADLs as indicated by Functional Deficits. [] Progressing: [x] Met: [] Not Met: [] Adjusted    Long Term Goals: To be achieved in: 4 weeks  1. Patient to demonstrate FGA score to   to demonstrate improved fall risk to assist with reaching prior level of function. [] Progressing: [x] Met: [] Not Met: [] Adjusted  2. FOTO score of at least 52 to assist with reaching prior level of function. [] Progressing: [x] Met: [] Not Met: [] Adjusted  3. Patient will demonstrate an increase in B hip abd & ext strength to 4-/5 in order to improve confidence with prolonged gait. [] Progressing: [x] Met: [] Not Met: [] Adjusted  4. Patient to demonstrate increased reps to 11 for 30\" STS, with good eccentric control, in order to improve community access. [] Progressing: [x] Met: [] Not Met: [] Adjusted  5. Patient to improve B quad length for active knee flexion to 95 deg while prone in order to improve balance immediately after sit to stand transfer. [] Progressing: [] Met: [x] Not Met: [] Adjusted     Date range of Visits: 22 - 22  Total Visits: 9    Recommendation:    [x] Discharge to [facility]. Follow up with PT or MD PRN. Physical Therapy Daily Treatment Note    Date:  2022     Patient Name:  Chloe Persaud    :  1945  MRN: [id number]  Medical Diagnosis: At high risk for falls [Z91.81]  Treatment Diagnosis: Decreased B hip & L knee strength, Impaired balance and gait     Insurance/Certification information:  PT Insurance Information: Medicare.  Secondary:   Physician Information:  Villatoro, Damaris Guy MD    Plan of care signed (Y/N): [x]  Yes []  No     Date of Patient follow up with Physician:      Progress Report: [x]  Yes  []  No     Date Range for reporting period:  Beginnin2022 - signed  POC: 22 - signed  Endin22      Progress report due (10 Rx/or 30 days whichever is less): visit #10 or  (date)     Recertification due (POC duration/ or 90 days whichever is less): visit #12 or 23 (date)     Visit # Insurance Allowable Auth required? Date Range    mn []  Yes  [x]  No pcy       Latex Allergy:  [x]NO      []YES  Preferred Language for Healthcare:   [x]English       []Other:        Functional Scale:                                                                                                      Date assessed:  FOTO physical FS primary measure score = 46; risk adjusted = 50                    FOTO physical FS primary measure score = 63           Functional Scale/Test Evaluation 2022 30 day   22 Discharge  22    Tinetti Assessment   NT    30\" STS 8, decr ecc control 10 w/ UEs  10 No UEs 11 no UEs    FGA          Pain level:  0/10  Location:  knee    SUBJECTIVE:  Pt reports that she is running late because of her dog.      OBJECTIVE: : Trendelenburg noted B/L, pt not using any AD  : Pt brought walking stick today  : POC completed this date, see above       : Pt 10 min late, PT able to accomodate    RESTRICTIONS/PRECAUTIONS: Brain surgery in , R TKA , HTN, seizure disorder but is well controlled w/medication    Interventions/Exercises:   Therapeutic Exercises (90928) Resistance / level Sets/sec Reps Notes   Seated stepper L 3 6 min      IB - gastroc  HR  2x30\"  30\"       HSS  2x30\" B     HFS      TM -retro  -side stepping       Total gym       Mat table- stretches to perform before getting out of bed in the morning   - LTR  - SKTC  - Bridge  - Seated EOM lumbar flexion           Glider - retro      Glider - lateral    Small ROM   Assessment for DC - see above 12/30             Neuromuscular Re-ed (87905)        AirEx  - DLS  - NBOS: EO and EC  - Tandem       NO UE Support  Fingertip support for EC  Fingertip support B    Shuttle balance       3 way SLR stance leg on Airex  Squats on AirEx  Marching on AirEx      1 min  B UE support B UE support 2 fingertip support    Walking on gray mats  - fwd  - side stepping  - retro 2 mats  No UE suport                               Therapeutic Activities (14138) /  Functional Tasks / Gait Training (47296)       Step ups - forward  6 inch  1 UE support   Step ups - lateral 6 inch  1 UE support    Sit to stand transitions 22\"  20\"  No UE support      Fwd stepping over tosha w/ weight shift 1 tosha 1 UE support                  Gait train w/walking stick   12/13: Used in L hand, practiced ambulation with 3 point pattern (stick, R LE, L LE), also encouraged pt to keep more space between feet                         Manual Intervention (24312)       Quad stretch  - S/L  Piriformis stretch  -supine                                             Modalities:     Pt. Education:  11/16/2022 patient educated on diagnosis, prognosis and expectations for rehab, all patient questions were answered    HEP instruction:  Access Code: [email or web address]  URL: [email or web address]  Date: 11/16/2022  Prepared by: Lolita Mancilla     Exercises  Seated Piriformis Stretch - 3 x daily - 7 x weekly - 3 sets - 30\" hold  Bridge - 2 x daily - 7 x weekly - 1 sets - 10 reps  Standing Hip Abduction with Anterior Support - 2 x daily - 7 x weekly - 1 sets - 10 reps  Sit to Stand with Armchair - 2 x daily - 7 x weekly - 1 sets - 10 reps    Access Code: [email or web address]  URL: [email or web address]  Date: 11/30/2022  Prepared by: Nick Yuan    Exercises  Supine Lower Trunk Rotation - 1 x daily - 7 x weekly - 1 sets - 10 reps  Hooklying Single Knee to Chest Stretch - 1 x daily - 7 x weekly - 1 sets - 10 reps  Supine Bridge - 1 x daily - 7 x weekly - 1 sets - 10 reps  Seated Flexion Stretch - 1 x daily - 7 x weekly - 1 sets - 5 reps    Access Code: [email or web address]  URL: [email or web address]  Date: 12/20/2022 - issued for pt following shoulder injection  Prepared by: Odilon Salter    Exercises  Prone Quadriceps Stretch with Strap - 2 x daily - 7 x weekly - 3 reps - 30 seconds hold  Seated Scapular Retraction - 1 x daily - 7 x weekly - 2 sets - 10 reps  Shoulder Flexion Wall Slide with Towel - 1 x daily - 7 x weekly - 2 sets - 10 reps  Scaption Wall Slide with Towel - 1 x daily - 7 x weekly - 2 sets - 10 reps  Seated Shoulder Flexion - 1 x daily - 7 x weekly - 2 sets - 10 reps  Seated Shoulder Abduction - Palms Down - 1 x daily - 7 x weekly - 2 sets - 10 reps  Doorway Pec Stretch at 60 Elevation - 1 x daily - 7 x weekly - 1 sets - 3 reps - 30 seconds hold      Therapeutic Exercise and NMR EXR  [x] (08116) Provided verbal/tactile cueing for activities related to strengthening, flexibility, endurance, ROM for improvements in  [x] LE / Core stability: LE, hip, and core control with self care, mobility, lifting, ambulation. [] UE / Shoulder complex: cervical, postural, scapular, scapulothoracic and UE control with self care, reaching, carrying, lifting, house/yardwork, driving, computer work. [x] (89039) Provided verbal/tactile cueing for activities related to improving balance, coordination, kinesthetic sense, posture, motor skill, proprioception to assist with   [x] LE / Core stability: LE, hip, and core control in self care, mobility, lifting, ambulation and eccentric single leg control.    [] UE / Shoulder complex: cervical, scapular, scapulothoracic and UE control with self care, reaching, carrying, lifting, house/yardwork, driving, computer work.   [] (79158) Therapist is in constant attendance of 2 or more patients providing skilled therapy interventions, but not providing any significant amount of measurable one-on-one time to either patient, for improvements in  [] LE / Core stability: LE, hip, and core control in self care, mobility, lifting, ambulation and eccentric single leg control. [] UE / Shoulder complex: cervical, scapular, scapulothoracic and UE control with self care, reaching, carrying, lifting, house/yardwork, driving, computer work. NMR and Therapeutic Activities:    [x] (17489 or 17503) Provided verbal/tactile cueing for activities related to improving balance, coordination, kinesthetic sense, posture, motor skill, proprioception and motor activation to allow for proper function of   [x] LE / Core, hip and LE with self care and ADLs  [] UE / Shoulder complex: cervical, postural, scapular, scapulothoracic and UE control with self care, carrying, lifting, driving, computer work. [x] (65967) Gait Re-education- Provided training and instruction to the patient for proper LE, core and hip recruitment, positioning, and eccentric body weight control with ambulation re-education, including ascending & descending stairs     Home Management Training / Self Care:  [] (92664) Provided self-care/home management training related to activities of daily living and compensatory training, and/or use of adaptive equipment for improvement with: ADLs and compensatory training, meal preparation, safety procedures and instruction in use of adaptive equipment, including bathing, grooming, dressing, personal hygiene, basic household cleaning and chores.        Home Exercise Program:    [] (05340) Reviewed/Progressed HEP activities related to strengthening, flexibility, endurance, ROM of   [] LE / Core stability: core, hip and LE for functional self-care, mobility, lifting and ambulation/stair navigation   [] UE / Shoulder complex: cervical, postural, scapular, scapulothoracic and UE control with self care, reaching, carrying, lifting, house/yardwork, driving, computer work  [] (33806)Reviewed/Progressed HEP activities related to improving balance, coordination, kinesthetic sense, posture, motor skill, proprioception of   [] LE: core, hip and LE for self care, mobility, lifting, and ambulation/stair navigation    [] UE / Shoulder complex: cervical, postural,  scapular, scapulothoracic and UE control with self care, reaching, carrying, lifting, house/yardwork, driving, computer work    Manual Treatments:  PROM / STM / Oscillations-Mobs:  G-I, II, III, IV (PA's, Inf., Post.)  [] (58260) Provided manual therapy to mobilize shoulder complex, hip, LE, and/or cervicothoracic/LS spine soft tissue/joints for the purpose of modulating pain, promoting relaxation,  increasing ROM, reducing/eliminating soft tissue swelling/inflammation/restriction, improving soft tissue extensibility and allowing for proper ROM for normal function with   [] LE / Core stability: self care, mobility, lifting and ambulation. [] UE / Shoulder complex: self care, reaching, carrying, lifting, house/yardwork, driving, computer work. Modalities:  [] (91562) Vasopneumatic compression: Utilized vasopneumatic compression to decrease edema / swelling for the purpose of improving mobility and quad tone / recruitment which will allow for increased overall function including but not limited to self-care, transfers, ambulation, and ascending / descending stairs. Charges:  Timed Code Treatment Minutes: 30   Total Treatment Minutes: 30     [] EVAL - LOW (15263)   [] EVAL - MOD (83875)  [] EVAL - HIGH (66164)  [] RE-EVAL (03334)  [x] TE (24288) x 2    [] Ionto  [] NMR (99229) x  1    [] Vaso  [] Manual (38501) x      [] Ultrasound  [] TA x  1     [] Mech Traction (66776)  [] Gait Training x 1   [] ES (un) (95876):   [] Aquatic therapy x   [] Other:   [] Group:     Overall Progression Towards Functional goals/ Treatment Progress Update:  [] Patient is progressing as expected towards functional goals listed.     [] Progression is slowed due to complexities/Impairments listed. [] Progression has been slowed due to co-morbidities. [x] Plan just implemented, too soon to assess goals progression <30days   [] Goals require adjustment due to lack of progress  [] Patient is not progressing as expected and requires additional follow up with physician  [] Other    Persisting Functional Limitations/Impairments:  []Sleeping []Sitting               []Standing [x]Transfers        [x]Walking []Kneeling               [x]Stairs [x]Squatting / bending   []ADLs []Reaching  [x]Lifting  []Housework  []Driving []Job related tasks  []Sports/Recreation []Other:        ASSESSMENT: DC for balance completed this date - pt to be evaluated for shoulder pain in January 2023    Treatment/Activity Tolerance:  [x] Patient able to complete tx [] Patient limited by fatigue  [] Patient limited by pain  [] Patient limited by other medical complications  [] Other:     Prognosis: [x] Good [] Fair  [] Poor    Patient Requires Follow-up: [] Yes  [x] No    Plan for next treatment session: quad flexiblity, lumbar extension, hip strength, STAIRS     PLAN: See eval. PT 2x / week for 6 weeks. [] Continue per plan of care [] Alter current plan (see comments)  [] Plan of care initiated [] Hold pending MD visit [x] Discharge    Electronically signed by:   Stevie Kay PT DPT    Note: If patient does not return for scheduled/ recommended follow up visits, his note will serve as a discharge from care along with most recent update on progress.

## 2023-01-03 ENCOUNTER — HOSPITAL ENCOUNTER (OUTPATIENT)
Dept: PHYSICAL THERAPY | Age: 78
Setting detail: THERAPIES SERIES
Discharge: HOME | End: 2023-01-03

## 2023-01-10 ENCOUNTER — HOSPITAL ENCOUNTER (OUTPATIENT)
Dept: PHYSICAL THERAPY | Age: 78
Setting detail: THERAPIES SERIES
Discharge: HOME | End: 2023-01-10
Payer: MEDICARE

## 2023-01-10 ENCOUNTER — OFFICE VISIT (OUTPATIENT)
Dept: ORTHOPEDIC SURGERY | Age: 78
End: 2023-01-10
Payer: MEDICARE

## 2023-01-10 VITALS — WEIGHT: 227 LBS | RESPIRATION RATE: 16 BRPM | HEIGHT: 67 IN | BODY MASS INDEX: 35.63 KG/M2

## 2023-01-10 DIAGNOSIS — M25.562 CHRONIC PAIN OF LEFT KNEE: ICD-10-CM

## 2023-01-10 DIAGNOSIS — M70.62 GREATER TROCHANTERIC BURSITIS OF LEFT HIP: Primary | ICD-10-CM

## 2023-01-10 DIAGNOSIS — G89.29 CHRONIC PAIN OF LEFT KNEE: ICD-10-CM

## 2023-01-10 PROCEDURE — 97161 PT EVAL LOW COMPLEX 20 MIN: CPT

## 2023-01-10 PROCEDURE — G8484 FLU IMMUNIZE NO ADMIN: HCPCS | Performed by: STUDENT IN AN ORGANIZED HEALTH CARE EDUCATION/TRAINING PROGRAM

## 2023-01-10 PROCEDURE — G8399 PT W/DXA RESULTS DOCUMENT: HCPCS | Performed by: STUDENT IN AN ORGANIZED HEALTH CARE EDUCATION/TRAINING PROGRAM

## 2023-01-10 PROCEDURE — 99213 OFFICE O/P EST LOW 20 MIN: CPT | Performed by: STUDENT IN AN ORGANIZED HEALTH CARE EDUCATION/TRAINING PROGRAM

## 2023-01-10 PROCEDURE — 1036F TOBACCO NON-USER: CPT | Performed by: STUDENT IN AN ORGANIZED HEALTH CARE EDUCATION/TRAINING PROGRAM

## 2023-01-10 PROCEDURE — G8427 DOCREV CUR MEDS BY ELIG CLIN: HCPCS | Performed by: STUDENT IN AN ORGANIZED HEALTH CARE EDUCATION/TRAINING PROGRAM

## 2023-01-10 PROCEDURE — 1090F PRES/ABSN URINE INCON ASSESS: CPT | Performed by: STUDENT IN AN ORGANIZED HEALTH CARE EDUCATION/TRAINING PROGRAM

## 2023-01-10 PROCEDURE — 97110 THERAPEUTIC EXERCISES: CPT

## 2023-01-10 PROCEDURE — 20605 DRAIN/INJ JOINT/BURSA W/O US: CPT | Performed by: STUDENT IN AN ORGANIZED HEALTH CARE EDUCATION/TRAINING PROGRAM

## 2023-01-10 PROCEDURE — 1123F ACP DISCUSS/DSCN MKR DOCD: CPT | Performed by: STUDENT IN AN ORGANIZED HEALTH CARE EDUCATION/TRAINING PROGRAM

## 2023-01-10 PROCEDURE — G8417 CALC BMI ABV UP PARAM F/U: HCPCS | Performed by: STUDENT IN AN ORGANIZED HEALTH CARE EDUCATION/TRAINING PROGRAM

## 2023-01-10 RX ORDER — LIDOCAINE HYDROCHLORIDE 10 MG/ML
2 INJECTION, SOLUTION INFILTRATION; PERINEURAL ONCE
Status: COMPLETED | OUTPATIENT
Start: 2023-01-10 | End: 2023-01-10

## 2023-01-10 RX ORDER — TRIAMCINOLONE ACETONIDE 40 MG/ML
40 INJECTION, SUSPENSION INTRA-ARTICULAR; INTRAMUSCULAR ONCE
Status: COMPLETED | OUTPATIENT
Start: 2023-01-10 | End: 2023-01-10

## 2023-01-10 RX ORDER — BUPIVACAINE HYDROCHLORIDE 2.5 MG/ML
2 INJECTION, SOLUTION INFILTRATION; PERINEURAL ONCE
Status: COMPLETED | OUTPATIENT
Start: 2023-01-10 | End: 2023-01-10

## 2023-01-10 RX ADMIN — LIDOCAINE HYDROCHLORIDE 2 ML: 10 INJECTION, SOLUTION INFILTRATION; PERINEURAL at 10:13

## 2023-01-10 RX ADMIN — TRIAMCINOLONE ACETONIDE 40 MG: 40 INJECTION, SUSPENSION INTRA-ARTICULAR; INTRAMUSCULAR at 10:13

## 2023-01-10 RX ADMIN — BUPIVACAINE HYDROCHLORIDE 5 MG: 2.5 INJECTION, SOLUTION INFILTRATION; PERINEURAL at 10:13

## 2023-01-10 NOTE — FLOWSHEET NOTE
[facility] Physical Therapy  Phone: [phone]   Fax: [phone]    Physical Therapy Daily Treatment Note    Date:  01/10/2023     Patient Name:  Simone Manuel    :  1945  MRN: [id number]  Medical Diagnosis:  Rotator cuff tendinitis, left [M75.82]  Treatment Diagnosis: L shoulder pain with decreased ROM and weakness, difficulty reaching overhead  Insurance/Certification information:  PT Insurance Information: Medicare. Secondary:   Physician Information:  Melissa Banda MD    Plan of care signed (Y/N): []  Yes [x]  No     Date of Patient follow up with Physician:      Progress Report: []  Yes  [x]  No     Date Range for reporting period:  Beginnin/10/2023  Ending:     Progress report due (10 Rx/or 30 days whichever is less): visit #10 or       Recertification due (POC duration/ or 90 days whichever is less): visit #12 or 23     Visit # Insurance Allowable Auth required?  Date Range    BMN []  Yes  [x]  No N/a         Latex Allergy:  [x]NO      []YES  Preferred Language for Healthcare:   [x]English       []other:    Functional Scale:       Date assessed:  QDASH: raw score = 25; dysfunction = 32%  1/10/23    Pain level:  5-6/10     SUBJECTIVE:  See eval    OBJECTIVE: See eval      RESTRICTIONS/PRECAUTIONS: None    Exercises/Interventions:     Therapeutic Exercises (73040) Resistance / level Sets/sec Reps Notes   UBE       Wall slides       Pulleys                                                 Therapeutic Activities (79870)                                   Gait (79483)                                   Neuromuscular Re-ed (11091)                                                 Manual Intervention (08594)       PROM L shoulder                                               Modalities:     Pt. Education:  01/10/2023  -patient educated on diagnosis, prognosis and expectations for rehab  -all patient questions were answered    Home Exercise Program:  Access Code: [email or web address]  URL: [email or web address]  Date: 01/10/2023  Prepared by: Regina Rivera     Exercises  Seated Scapular Retraction - 1 x daily - 7 x weekly - 2 sets - 10 reps  Shoulder Flexion Wall Slide with Towel - 1 x daily - 7 x weekly - 2 sets - 10 reps  Scaption Wall Slide with Towel - 1 x daily - 7 x weekly - 2 sets - 10 reps  Seated Shoulder Flexion - 1 x daily - 7 x weekly - 2 sets - 10 reps  Seated Shoulder Abduction - Palms Down - 1 x daily - 7 x weekly - 2 sets - 10 reps  Doorway Pec Stretch at 60 Elevation - 1 x daily - 7 x weekly - 1 sets - 3 reps - 30 seconds hold      Therapeutic Exercise and NMR EXR  [] (97684) Provided verbal/tactile cueing for activities related to strengthening, flexibility, endurance, ROM for improvements in  [] LE / Lumbar: LE, proximal hip, and core control with self care, mobility, lifting, ambulation. [] UE / Cervical: cervical, postural, scapular, scapulothoracic and UE control with self care, reaching, carrying, lifting, house/yardwork, driving, computer work.  [] (95521) Provided verbal/tactile cueing for activities related to improving balance, coordination, kinesthetic sense, posture, motor skill, proprioception to assist with   [] LE / lumbar: LE, proximal hip, and core control in self care, mobility, lifting, ambulation and eccentric single leg control. [] UE / cervical: cervical, scapular, scapulothoracic and UE control with self care, reaching, carrying, lifting, house/yardwork, driving, computer work.   [] (13351) Therapist is in constant attendance of 2 or more patients providing skilled therapy interventions, but not providing any significant amount of measurable one-on-one time to either patient, for improvements in  [] LE / lumbar: LE, proximal hip, and core control in self care, mobility, lifting, ambulation and eccentric single leg control.    [] UE / cervical: cervical, scapular, scapulothoracic and UE control with self care, reaching, carrying, lifting, house/yardwork, driving, computer work. NMR and Therapeutic Activities:    [] (70337 or 51921) Provided verbal/tactile cueing for activities related to improving balance, coordination, kinesthetic sense, posture, motor skill, proprioception and motor activation to allow for proper function of   [] LE: / Lumbar core, proximal hip and LE with self care and ADLs  [] UE / Cervical: cervical, postural, scapular, scapulothoracic and UE control with self care, carrying, lifting, driving, computer work.   [] (09020) Gait Re-education- Provided training and instruction to the patient for proper LE, core and proximal hip recruitment and positioning and eccentric body weight control with ambulation re-education including up and down stairs     Home Management Training / Self Care:  [] (32693) Provided self-care/home management training related to activities of daily living and compensatory training, and/or use of adaptive equipment for improvement with: ADLs and compensatory training, meal preparation, safety procedures and instruction in use of adaptive equipment, including bathing, grooming, dressing, personal hygiene, basic household cleaning and chores.      Home Exercise Program:    [x] (48509) Reviewed/Progressed HEP activities related to strengthening, flexibility, endurance, ROM of   [] LE / Lumbar: core, proximal hip and LE for functional self-care, mobility, lifting and ambulation/stair navigation   [] UE / Cervical: cervical, postural, scapular, scapulothoracic and UE control with self care, reaching, carrying, lifting, house/yardwork, driving, computer work  [] (53551)Reviewed/Progressed HEP activities related to improving balance, coordination, kinesthetic sense, posture, motor skill, proprioception of   [] LE: core, proximal hip and LE for self care, mobility, lifting, and ambulation/stair navigation    [] UE / Cervical: cervical, postural,  scapular, scapulothoracic and UE control with self care, reaching, carrying, lifting, house/yardwork, driving, computer work    Manual Treatments:  PROM / STM / Oscillations-Mobs:  G-I, II, III, IV (PA's, Inf., Post.)  [] (81910) Provided manual therapy to mobilize LE, proximal hip and/or LS spine soft tissue/joints for the purpose of modulating pain, promoting relaxation,  increasing ROM, reducing/eliminating soft tissue swelling/inflammation/restriction, improving soft tissue extensibility and allowing for proper ROM for normal function with   [] LE / lumbar: self care, mobility, lifting and ambulation. [] UE / Cervical: self care, reaching, carrying, lifting, house/yardwork, driving, computer work. Modalities:  [] (47279) Vasopneumatic compression: Utilized vasopneumatic compression to decrease edema / swelling for the purpose of improving mobility and quad tone / recruitment which will allow for increased overall function including but not limited to self-care, transfers, ambulation, and ascending / descending stairs. Charges:  Timed Code Treatment Minutes: 10   Total Treatment Minutes: 40     [x] EVAL - LOW (93584)   [] EVAL - MOD (40511)  [] EVAL - HIGH (54583)  [] RE-EVAL (57243)  [x] GR(29852) x 1      [] Ionto  [] NMR (28673) x       [] Vaso  [] Manual (36044) x       [] Ultrasound  [] TA x        [] Mech Traction (46128)  [] Aquatic Therapy x     [] ES (un) (10105):   [] Home Management Training x  [] ES(attended) (45688)   [] Dry Needling 1-2 muscles (35247):  [] Dry Needling 3+ muscles (324159)  [] Group:      [] Other:     GOALS: Patient stated goal: Reduce shoulder pain   [] Progressing: [] Met: [] Not Met: [] Adjusted     Therapist goals for Patient:   Short Term Goals: To be achieved in: 2 weeks  1. Independent in HEP and progression per patient tolerance, in order to prevent re-injury. [] Progressing: [] Met: [] Not Met: [] Adjusted  2.  Patient will have a decrease in pain to facilitate improvement in movement, function, and ADLs as indicated by Functional Deficits. [] Progressing: [] Met: [] Not Met: [] Adjusted     Long Term Goals: To be achieved in: 5 weeks  1. Pt will improve QDASH score by 10 points to reduce disability and progress towards PLOF. [] Progressing: [] Met: [] Not Met: [] Adjusted  2. Patient will demonstrate increased L shoulder flexion and abduction AROM to at least 140 deg to allow for proper joint functioning when reaching for items on an overhead shelf. [] Progressing: [] Met: [] Not Met: [] Adjusted  3. Patient will demonstrate an increase in gross L shoulder strength to 4+/5 to improve pt ability to pull a heavy item (at least 10#) out of a cupboard. [] Progressing: [] Met: [] Not Met: [] Adjusted    Overall Progression Towards Functional goals/ Treatment Progress Update:  [] Patient is progressing as expected towards functional goals listed. [] Progression is slowed due to complexities/Impairments listed. [] Progression has been slowed due to co-morbidities.   [x] Plan just implemented, too soon to assess goals progression <30days   [] Goals require adjustment due to lack of progress  [] Patient is not progressing as expected and requires additional follow up with physician  [] Other    Persisting Functional Limitations/Impairments:  []Sleeping []Sitting               []Standing []Transfers        []Walking []Kneeling               []Stairs []Squatting / bending   []ADLs [x]Reaching  [x]Lifting  []Housework  []Driving []Job related tasks  []Sports/Recreation []Other:        ASSESSMENT:  See eval    Treatment/Activity Tolerance:  [x] Patient able to complete tx [] Patient limited by fatigue  [] Patient limited by pain  [] Patient limited by other medical complications  [] Other:     Prognosis: [x] Good [] Fair  [] Poor    Patient Requires Follow-up: [x] Yes  [] No    Plan for next treatment session:L shoulder ROM and strength, posture, decreased reliance on UTs    PLAN: See eval. PT 2x / week for 5 weeks. [] Continue per plan of care [] Alter current plan (see comments)  [x] Plan of care initiated [] Hold pending MD visit [] Discharge    Electronically signed by: Tessie Kelley, PT PT, DPT    Note: If patient does not return for scheduled/ recommended follow up visits, this note will serve as a discharge from care along with most recent update on progress.

## 2023-01-10 NOTE — PLAN OF CARE
[id number]  [facility], [address]  Phone: [phone]   Fax: [phone]                                                     Physical Therapy Certification    Dear CHELSEA Blevins  ,    We had the pleasure of evaluating the following patient for physical therapy services at [address] [place] [address]. A summary of our findings can be found in the initial assessment below. This includes our plan of care. If you have any questions or concerns regarding these findings, please do not hesitate to contact me at the office phone number checked above. Thank you for the referral.       Physician Signature:_______________________________Date:__________________  By signing above (or electronic signature), therapists plan is approved by physician      Patient: Angel Bedoya   : 1945   MRN: [id number]  Referring Physician: CHELSEA Blevins        Evaluation Date: 1/10/2023      Medical Diagnosis Information:  Rotator cuff tendinitis, left [M75.82]   PT diagnosis: L shoulder pain with decreased ROM and weakness, difficulty reaching overhead                                        Insurance information: PT Insurance Information: Medicare. Secondary:     Precautions/ Contra-indications: None   Latex Allergy:  [x]NO      []YES  Preferred Language for Healthcare:   [x]English       []Other:    C-SSRS Triggered by Intake questionnaire (Past 2 wk assessment ):   [x] No, Questionnaire did not trigger screening.   [] Yes, Patient intake triggered C-SSRS Screening     [] Completed, no further action required. [] Completed, PCP notified via Epic    SUBJECTIVE: Patient stated complaint: Pt here for L shoulder pain. Patient reports pain has been bothering her over a month. She did have a cortisone injection which did help pain quite a bit. Patient reports no issues with ADLs.  Pt reports really only having difficulty with OH activities. Pain is in the upper arm. Relevant Medical History: See below     Functional Scale:       Date assessed:  QDASH: raw score = 25; dysfunction = 32%  1/10/23    Pain Scale: 5-6/10  Easing factors: rest  Provocative factors: reaching      Type: []Constant   [x]Intermittent  []Radiating []Localized []other:     Numbness/Tingling: Denies     Occupation/School: walking with , dogs    Living Status/Prior Level of Function: Prior to this injury / incident, pt was independent with ADLs and IADLs. Pt also frequently takes care of her daughter's dog.        OBJECTIVE:   Hand dominance: Right     Palpation:  tightness in L UTs, pain in ant shoulder (prox biceps tendon) and at teres major/minor     Functional Mobility/Transfers: IND - occasionally uses SPC for ambulation    Posture: Good    Bandages/Dressings/Incisions: n/a      Dermatomes Normal Abnormal Comments   Top of head (C1)      Posterior occipital region (C2)      Side of neck (C3)      Top of shoulder (C4)      Lateral deltoid (C5)      Tip of thumb (C6)      Distal middle finger (C7)      Distal fifth finger (C8)      Medial forearm (T1)          Reflexes Normal Abnormal Comments   C5-6 Biceps      C5-6 Brachioradialis      C7-8 Triceps      Dos      Clonus          Cervical AROM screen: [x] WFL - general stiffness  [] abnormal:     PROM AROM    L R L R   Cervical Flexion        Cervical Extension        Cervical Rotation       Cervical Side-bend       Shoulder Flexion    120 140   Shoulder Abduction    125 145   Shoulder External Rotation    Fxl: T2-3 Fxl: T2-3   Shoulder Internal Rotation    Fxl: Opp scapula Fxl: apex of Opp scapula, lower thoracic spine    Elbow Flexion    WNL WNL   Elbow Extension    WNL WNL   Pronation    WNL WNL   Supination    WNL WNL   ** feels stretch with pronation/supine w/ IR/ER    Strength (0-5) Left Right    Shoulder Shrug (C4)     Shoulder Flex 4 4+   Shoulder Abd (C5) 4 * 4+   Shoulder ER 3+ compensates with bicep 4 compensates with bicep   Shoulder IR 4 5   Biceps (C6) 4+ 5   Triceps (C7) 4+ 5   Lats     Middle Trap     Rhomboids     Lower Trap      Pronation      Supination      Wrist Flex (C7)     Wrist Ext (C6)     Wrist Radial Deviation     Wrist Ulnar Deviation           *painful         Orthopaedic Special Tests Positive  Negative  NT Comments    Shoulder        Neer   X     Dominic-Ruvalcaba  X     Empty Can  X     Drop Arm       [place]'s       Speeds        Sulcus        Apprehension (Anterior / Posterior)       Load and Shift                               [x] Patient history, allergies, meds reviewed. Medical chart reviewed. See intake form. Review Of Systems (ROS):  [x]Performed Review of systems (Integumentary, CardioPulmonary, Neurological) by intake and observation. Intake form has been scanned into medical record. Patient has been instructed to contact their primary care physician regarding ROS issues if not already being addressed at this time.       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   []Osteoporosis (M81.8)  []Osteopenia (M85.8)  []Scoliosis       Endocrine conditions   []Hypothyroid (E03.9)  []Hyperthyroid Gastrointestinal conditions   []Constipation (L12.30)   Metabolic conditions   []Morbid obesity (E66.01)  []Diabetes type 1(E10.65) or 2 (E11.65)   []Neuropathy (G60.9)     Cardio/Pulmonary conditions   []Asthma (J45)  []Coughing   []COPD (J44.9)  []CHF  []A-fib   Psychological Disorders  []Anxiety (F41.9)  []Depression (F32.9)   []Other:   Developmental Disorders  []Autism (F84.0)  []CP (G80)  []Down Syndrome (Q90.9)  []Developmental delay     Neurological conditions  []Prior Stroke (I69.30)  []Parkinson's (G20)  []Encephalopathy (G93.40)  []MS (G35)  []Post-polio (G14)  []SCI  []TBI  []ALS Other conditions  []Fibromyalgia (M79.7)  []Vertigo  []Syncope  []Kidney Failure  []Cancer      []currently undergoing                treatment  []Pregnancy  []Incontinence   Prior surgeries  []involved limb  []previous spinal surgery  [] section birth  [x]hysterectomy  []bowel / bladder surgery  []other relevant surgeries   [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  [] Falls Risk assessed and Patient requires intervention due to being higher risk   TUG score (>12s at risk):     [] Falls education provided, including       ASSESSMENT:   Functional Impairments   []Noted spinal or UE joint hypomobility   []Noted spinal or UE joint hypermobility   [x]Decreased UE functional ROM   [x]Decreased UE functional strength   []Abnormal reflexes/sensation/myotomal/dermatomal deficits   [x]Decreased RC/scapular/core strength and neuromuscular control   []other:      Functional Activity Limitations (from functional questionnaire and intake)   []Reduced ability to tolerate prolonged functional positions   []Reduced ability or difficulty with changes of positions or transfers between positions   []Reduced ability to maintain good posture and demonstrate  good body mechanics with sitting, bending, and lifting   [] Reduced ability or tolerance with driving and/or computer work   []Reduced ability to sleep   [x]Reduced ability to perform lifting, reaching, carrying tasks   []Reduced ability to tolerate impact through UE   []Reduced ability to reach behind back   []Reduced ability to  or hold objects   []Reduced ability to throw or toss an object   []other:    Participation Restrictions   []Reduced participation in self care activities   [x]Reduced participation in home management activities   []Reduced participation in work activities   []Reduced participation in social activities. []Reduced participation in sport/recreation activities. Classification:   []Signs/symptoms consistent with post-surgical status including decreased ROM, strength and function.   []Signs/symptoms consistent with joint sprain/strain   []Signs/symptoms consistent with shoulder impingement   [x]Signs/symptoms consistent with shoulder/elbow/wrist tendinopathy   []Signs/symptoms consistent with Rotator cuff tear   []Signs/symptoms consistent with labral tear   []Signs/symptoms consistent with postural dysfunction    []Signs/symptoms consistent with Glenohumeral IR Deficit - <45 degrees   []Signs/symptoms consistent with facet dysfunction of cervical/thoracic spine    []Signs/symptoms consistent with pathology which may benefit from Dry needling     []other:     Prognosis/Rehab Potential:      []Excellent   [x]Good    []Fair   []Poor    Tolerance of evaluation/treatment:    []Excellent   [x]Good    []Fair   []Poor    Physical Therapy Evaluation Complexity Justification  [x] A history of present problem with:  [] no personal factors and/or comorbidities that impact the plan of care;  []1-2 personal factors and/or comorbidities that impact the plan of care  [x]3 personal factors and/or comorbidities that impact the plan of care  [x] An examination of body systems using standardized tests and measures addressing any of the following: body structures and functions (impairments), activity limitations, and/or participation restrictions;:  [] a total of 1-2 or more elements   [] a total of 3 or more elements   [x] a total of 4 or more elements   [x] A clinical presentation with:  [x] stable and/or uncomplicated characteristics   [] evolving clinical presentation with changing characteristics  [] unstable and unpredictable characteristics;   [x] Clinical decision making of [x] low, [] moderate, [] high complexity using standardized patient assessment instrument and/or measurable assessment of functional outcome. [x] EVAL (LOW) 84272 (typically 15 minutes face-to-face)  [] EVAL (MOD) 63332 (typically 30 minutes face-to-face)  [] EVAL (HIGH) 77584 (typically 45 minutes face-to-face)  [] RE-EVAL     PLAN:  Frequency/Duration:  2 days per week for 5 Weeks:  INTERVENTIONS:  [x] Therapeutic exercise including: strength training, ROM, for Upper extremity and core   [x]  NMR activation and proprioception for UE, scap and Core   [x] Manual therapy as indicated for shoulder, scapula and spine to include: Dry Needling/IASTM, STM, PROM, Gr I-IV mobilizations, manipulation. [x] Modalities as needed that may include: thermal agents, E-stim, Biofeedback, US, iontophoresis as indicated  [x] Patient education on joint protection, postural re-education, activity modification, progression of HEP. HEP instruction: Written HEP instructions provided and reviewed     Access Code: [email or web address]  URL: [email or web address]  Date: 01/10/2023  Prepared by: Carla Gloria    Exercises  Seated Scapular Retraction - 1 x daily - 7 x weekly - 2 sets - 10 reps  Shoulder Flexion Wall Slide with Towel - 1 x daily - 7 x weekly - 2 sets - 10 reps  Scaption Wall Slide with Towel - 1 x daily - 7 x weekly - 2 sets - 10 reps  Seated Shoulder Flexion - 1 x daily - 7 x weekly - 2 sets - 10 reps  Seated Shoulder Abduction - Palms Down - 1 x daily - 7 x weekly - 2 sets - 10 reps  Doorway Pec Stretch at 60 Elevation - 1 x daily - 7 x weekly - 1 sets - 3 reps - 30 seconds hold      GOALS:  Patient stated goal: Reduce shoulder pain   [] Progressing: [] Met: [] Not Met: [] Adjusted    Therapist goals for Patient:   Short Term Goals: To be achieved in: 2 weeks  1. Independent in HEP and progression per patient tolerance, in order to prevent re-injury. [] Progressing: [] Met: [] Not Met: [] Adjusted  2. Patient will have a decrease in pain to facilitate improvement in movement, function, and ADLs as indicated by Functional Deficits. [] Progressing: [] Met: [] Not Met: [] Adjusted    Long Term Goals: To be achieved in: 5 weeks  1. Pt will improve QDASH score by 10 points to reduce disability and progress towards PLOF. [] Progressing: [] Met: [] Not Met: [] Adjusted  2. Patient will demonstrate increased L shoulder flexion and abduction AROM to at least 140 deg to allow for proper joint functioning when reaching for items on an overhead shelf. [] Progressing: [] Met: [] Not Met: [] Adjusted  3. Patient will demonstrate an increase in gross L shoulder strength to 4+/5 to improve pt ability to pull a heavy item (at least 10#) out of a cupboard.    [] Progressing: [] Met: [] Not Met: [] Adjusted      Electronically signed by:  Roslyn Emerson PT DPT

## 2023-01-10 NOTE — PROGRESS NOTES
CHIEF COMPLAINT: Left hip pain. History:   Mohamud Gutiérrez is a 76 y.o. female who presents today for evaluation and treatment of left hip pain / injury. Patient was self-referred. This is evaluated as a personal injury. She states that the pain began 2  months ago. Patient rates the pain as a 6/10. Patient did not have a history of injury. She has history of instability in both knees and hips which causes her to feel weak with walking. She has been in physical therapy to work on strengthening. Her pain is located at the lateral hip over the greater trochanter with radiation down the lateral aspect of the thigh. She complains of left posterior knee pain as well. Patient does not have a history of back pain. Patient has had PT. The patient has not taken NSAIDs. The patient has not had an intra-articular hip injection. The patient's occupation is retired. Outside reports reviewed: none.       Past Medical History:   Diagnosis Date    Allergic rhinitis     Bell's palsy 01/2005    Left    Cervical spondylosis     C5-7    Closed fracture of fifth metatarsal bone 03/2011    Left- displaced    Colorectal polyps     Essential hypertension     GERD (gastroesophageal reflux disease)      EGD 8/08- mild gastritis    Goiter     Multinodular- s/p thyroidectomy 7/04    Hyperlipidemia     Irregular uterine bleeding     Meningiomas, multiple (HCC)     Menorrhagia     Migraine headache     Osteoarthritis     Post-surgical hypothyroidism     Seizure disorder ([address]     Secondary to meningioma    Spinal stenosis, lumbar     L3-4, L4-5: moderately severe    Tricuspid regurgitation     Trigger finger     Right    Vaginal prolapse     Venous insufficiency        Past Surgical History:   Procedure Laterality Date    ARTHROPLASTY Left 9/05    4th toe    BLEPHAROPLASTY Bilateral 5/06    lower eyelid    BRAIN MENINGIOMA EXCISION  1992, 2000    CARPAL TUNNEL RELEASE Bilateral 1991, 1993, 2003    CHOLECYSTECTOMY  2002    EYE SURGERY Left 11/10/2020    REVISION LEFT ENDOSCOPIC DACRYOCYSTORHINOSTOMY/ DACRYOCYSTORHINOSTOMY WITH STENT PLACEMENT, LEFT SIDE     FINGER TRIGGER RELEASE Right     FINGER TRIGGER RELEASE Right 13    FOOT SURGERY      with screws placed     FOOT SURGERY Right 2018    OSTEOTOMY 1ST METATARSAL WITH BUNIONECTOMY    HAMMER TOE SURGERY Right 13    HYSTERECTOMY (CERVIX STATUS UNKNOWN)      still with ovaries    HYSTERECTOMY, TOTAL ABDOMINAL (CERVIX REMOVED)      Secondary to DUB    SHOULDER ARTHROSCOPY Left 13    THYROIDECTOMY      TOTAL KNEE ARTHROPLASTY Right        Family History   Problem Relation Age of Onset    Asthma Mother     Heart Failure Mother          61    Prostate Cancer Father          70    Diabetes Maternal Grandmother         Type II    Glaucoma Maternal Grandmother     Rheum Arthritis Neg Hx     Osteoarthritis Neg Hx     Breast Cancer Neg Hx     Cancer Neg Hx     High Cholesterol Neg Hx     Hypertension Neg Hx     Migraines Neg Hx     Ovarian Cancer Neg Hx     Rashes/Skin Problems Neg Hx     Seizures Neg Hx     Stroke Neg Hx     Thyroid Disease Neg Hx        Social History     Socioeconomic History    Marital status:    Occupational History    Occupation: Homemaker   Tobacco Use    Smoking status: Never    Smokeless tobacco: Never   Vaping Use    Vaping Use: Never used   Substance and Sexual Activity    Alcohol use: Yes     Comment: 1-2 glasses of wine per month    Drug use: No    Sexual activity: Yes     Partners: Male     Social Determinants of Health     Financial Resource Strain: Low Risk     Difficulty of Paying Living Expenses: Not hard at all   Food Insecurity: No Food Insecurity    Worried About Running Out of Food in the Last Year: Never true    Ran Out of Food in the Last Year: Never true   Physical Activity: Insufficiently Active    Days of Exercise per Week: 2 days    Minutes of Exercise per Session: 40 min       Current Outpatient Medications   Medication Sig Dispense Refill    mometasone (NASONEX) 50 MCG/ACT nasal spray 2 sprays by Nasal route daily 3 each 1    pantoprazole (PROTONIX) 40 MG tablet Take 1 tablet by mouth daily 90 tablet 1    famotidine (PEPCID) 20 MG tablet Take 20 mg by mouth 2 times daily as needed      SYNTHROID 88 MCG tablet TAKE 1 TABLET DAILY 90 tablet 1    losartan (COZAAR) 50 MG tablet TAKE 1 TABLET DAILY 90 tablet 3    triamcinolone (KENALOG) 0.1 % ointment Apply topically nightly 30 g 3    Aspirin-Acetaminophen-Caffeine (EXCEDRIN PO) Take by mouth as needed      Cyanocobalamin (VITAMIN B 12 PO) Take 500 mcg by mouth daily. levETIRAcetam (KEPPRA) 500 MG tablet Take 500 mg by mouth 2 times daily. Ascorbic Acid (VITAMIN C PO) Take 500 mg by mouth daily       aspirin 81 MG EC tablet Take 81 mg by mouth daily. Calcium Citrate-Vitamin D (CALCIUM CITRATE + D PO) Take 1 tablet by mouth 2 times daily (with meals). No current facility-administered medications for this visit. Allergies   Allergen Reactions    Latex Anaphylaxis    Ondansetron     Sulfa Antibiotics      \"GAVE ME A TERRIBLE YEAST INFECTION\"        Review of Systems:  I have reviewed the clinically relevant past medical history, medications, allergies, family history, social history, and 13 point Review of Systems from the patient's recent history form & documented any details relevant to today's presenting complaints in the history above. The patient's self-reported past medical history, medications, allergies, family history, social history, and Review of Systems form from 1/10/23 have been scanned into the chart under the \"Media\" tab. Physical Examination:     Vital signs:     Vitals:    01/10/23 0947   Resp: 16        General:  alert, appears stated age, cooperative, and no distress   Gait:  Antalgic. The patient can bear weight on the injured extremity.      Left Hip  Trendelenburg test:  positive on Left   negative on Right Passive ROM:  0 degrees extension    100 degrees flexion   35 degrees external rotation   15 degrees internal rotation   Left hip: equivocal   Souleymane test:  positive   Greater trochanter tenderness:  positive   Pirifiormis / Gluteus medius tenderness:  positive   Iliopsoas strength:  4 / 5    Left hip: 4/5   Logroll:  negative   Straight-leg raise:  negative   Leg length discrepancy:  Equal      Motor exam noted and recorded, quadriceps, hamstrings, foot dorsiflexors and plantar flexors are intact 5/5 equal and symmetric. Sensation is intact grossly to light touch in the tibial, peroneal, sural, and saphenous nerve distributions bilaterally. Both feet are well perfused and sensory is intact to feet equal and symmetric. There is not  any cellulitis, skin lesions, or lymphedema noted bilaterally. Imaging:  Left knee xrays 3 views obtained and reviewed: No fracture or dislocation is present. The joint spaces are bone on bone at the medial and lateral compartments. The patella is well-centered within the trochlear groove with lateral osteophyte and mild narrowing. There are no loose bodies appreciated. Assessment:     Left hip Trochanteric bursitis      Plan:     Natural history and expected course discussed. Questions answered. I discussed with the patient the diagnosis and treatment options. We discussed conservative treatment to include activity modification, anti-inflammatory medications, physical therapy, and injections. The patient elected proceed with cortisone injection. After risks benefits alternatives discussed a left greater trochanter bursa injection was undertaken the bedside. The skin overlying the left hip was prepped with ChloraPrep ×3. The point of maximal tenderness over the greater trochanter was identified and marked.  A mixture of 40 mg of Kenalog with 2 ml of 1% lidocaine and 5 mg .25% marcaine was drawn up and instilled over the most tender point of the left greater trochanter with a 22-gauge needle. The needle was removed after the mixture was instilled and a Band-Aid was applied. She tolerated the procedure well without difficulty. It was advised to use ice and nsaids as needed. Continue PT and HEP. Follow up 3 months prn. Kim Orlando PA-C  Board Certified by the [employer] on Certification of [address] and [address]: This note was generated with use of a verbal recognition program and an attempt was made to check for errors. It is possible that there are still dictated errors within this office note. If so, please bring any significant errors to my attention for an addendum. All efforts were made to ensure that this office note is accurate.

## 2023-01-12 ENCOUNTER — HOSPITAL ENCOUNTER (OUTPATIENT)
Dept: PHYSICAL THERAPY | Age: 78
Setting detail: THERAPIES SERIES
Discharge: HOME | End: 2023-01-12
Payer: MEDICARE

## 2023-01-12 PROCEDURE — 97110 THERAPEUTIC EXERCISES: CPT

## 2023-01-12 PROCEDURE — 97140 MANUAL THERAPY 1/> REGIONS: CPT

## 2023-01-12 NOTE — FLOWSHEET NOTE
[facility]  Phone: [phone]   Fax: [phone]    Physical Therapy Daily Treatment Note    Date:  2023     Patient Name:  All De Dios    :  1945  MRN: [id number]  Medical Diagnosis:  Rotator cuff tendinitis, left [M75.82]  Treatment Diagnosis: L shoulder pain with decreased ROM and weakness, difficulty reaching overhead  Insurance/Certification information:  PT Insurance Information: Medicare. Secondary:   Physician Information:  Bill Mccarthy MD    Plan of care signed (Y/N): [x]  Yes []  No     Date of Patient follow up with Physician:      Progress Report: []  Yes  [x]  No     Date Range for reporting period:  Beginnin/10/2023  Ending:     Progress report due (10 Rx/or 30 days whichever is less): visit #10 or       Recertification due (POC duration/ or 90 days whichever is less): visit #12 or 23     Visit # Insurance Allowable Auth required?  Date Range    BMN []  Yes  [x]  No N/a         Latex Allergy:  [x]NO      []YES  Preferred Language for Healthcare:   [x]English       []other:    Functional Scale:       Date assessed:  QDASH: raw score = 25; dysfunction = 32%  1/10/23    Pain level:  5/10     SUBJECTIVE:  Pt reports she did get a chance to try the exercises at home but also had to pick her  up from the hospital.     OBJECTIVE: : PROM L shoulder flexion in reclined sitting to 170 deg       RESTRICTIONS/PRECAUTIONS: None    Exercises/Interventions:     Therapeutic Exercises (39308) Resistance / level Sets/sec Reps Notes   UBE  2 min ea way     Wall slides  - flexion  - scaption  - rainbows with red swiss ball     10 L  10 L   10            CC  - mid rows  - LPD  - high rows   2pl  2pl  2pl    20  20  20 : Started with very low weight to ensure proper form          Reclined AAROM with wand    - flexion  - abd Table at 35 deg incline      15 L  15 L    Seated active horiz abd 10 B                  Therapeutic Activities (15934)                                   Gait (73103)                                   Neuromuscular Re-ed (48188)                                                 Manual Intervention ([id number])       PROM L shoulder all directions with DTM to L UT In reclined sitting  15 min                                            Modalities: 1/12: CP to L shoulder in reclined sitting x 10 min     Pt. Education:  01/10/2023  -patient educated on diagnosis, prognosis and expectations for rehab  -all patient questions were answered    Home Exercise Program:  Access Code: [email or web address]  URL: [email or web address]  Date: 01/10/2023  Prepared by: Coles Kicks     Exercises  Seated Scapular Retraction - 1 x daily - 7 x weekly - 2 sets - 10 reps  Shoulder Flexion Wall Slide with Towel - 1 x daily - 7 x weekly - 2 sets - 10 reps  Scaption Wall Slide with Towel - 1 x daily - 7 x weekly - 2 sets - 10 reps  Seated Shoulder Flexion - 1 x daily - 7 x weekly - 2 sets - 10 reps  Seated Shoulder Abduction - Palms Down - 1 x daily - 7 x weekly - 2 sets - 10 reps  Doorway Pec Stretch at 60 Elevation - 1 x daily - 7 x weekly - 1 sets - 3 reps - 30 seconds hold      Therapeutic Exercise and NMR EXR  [x] (08668) Provided verbal/tactile cueing for activities related to strengthening, flexibility, endurance, ROM for improvements in  [] LE / Lumbar: LE, proximal hip, and core control with self care, mobility, lifting, ambulation. [x] UE / Cervical: cervical, postural, scapular, scapulothoracic and UE control with self care, reaching, carrying, lifting, house/yardwork, driving, computer work.  [] (97775) Provided verbal/tactile cueing for activities related to improving balance, coordination, kinesthetic sense, posture, motor skill, proprioception to assist with   [] LE / lumbar: LE, proximal hip, and core control in self care, mobility, lifting, ambulation and eccentric single leg control.    [] UE / cervical: cervical, scapular, scapulothoracic and UE control with self care, reaching, carrying, lifting, house/yardwork, driving, computer work.   [] (78249) Therapist is in constant attendance of 2 or more patients providing skilled therapy interventions, but not providing any significant amount of measurable one-on-one time to either patient, for improvements in  [] LE / lumbar: LE, proximal hip, and core control in self care, mobility, lifting, ambulation and eccentric single leg control. [] UE / cervical: cervical, scapular, scapulothoracic and UE control with self care, reaching, carrying, lifting, house/yardwork, driving, computer work. NMR and Therapeutic Activities:    [] (58965 or 25277) Provided verbal/tactile cueing for activities related to improving balance, coordination, kinesthetic sense, posture, motor skill, proprioception and motor activation to allow for proper function of   [] LE: / Lumbar core, proximal hip and LE with self care and ADLs  [] UE / Cervical: cervical, postural, scapular, scapulothoracic and UE control with self care, carrying, lifting, driving, computer work.   [] (31428) Gait Re-education- Provided training and instruction to the patient for proper LE, core and proximal hip recruitment and positioning and eccentric body weight control with ambulation re-education including up and down stairs     Home Management Training / Self Care:  [] (59719) Provided self-care/home management training related to activities of daily living and compensatory training, and/or use of adaptive equipment for improvement with: ADLs and compensatory training, meal preparation, safety procedures and instruction in use of adaptive equipment, including bathing, grooming, dressing, personal hygiene, basic household cleaning and chores.      Home Exercise Program:    [] (36139) Reviewed/Progressed HEP activities related to strengthening, flexibility, endurance, ROM of   [] LE / Lumbar: core, proximal hip and LE for functional self-care, mobility, lifting and ambulation/stair navigation   [] UE / Cervical: cervical, postural, scapular, scapulothoracic and UE control with self care, reaching, carrying, lifting, house/yardwork, driving, computer work  [] (28527)Reviewed/Progressed HEP activities related to improving balance, coordination, kinesthetic sense, posture, motor skill, proprioception of   [] LE: core, proximal hip and LE for self care, mobility, lifting, and ambulation/stair navigation    [] UE / Cervical: cervical, postural,  scapular, scapulothoracic and UE control with self care, reaching, carrying, lifting, house/yardwork, driving, computer work    Manual Treatments:  PROM / STM / Oscillations-Mobs:  G-I, II, III, IV (PA's, Inf., Post.)  [x] (51686) Provided manual therapy to mobilize LE, proximal hip and/or LS spine soft tissue/joints for the purpose of modulating pain, promoting relaxation,  increasing ROM, reducing/eliminating soft tissue swelling/inflammation/restriction, improving soft tissue extensibility and allowing for proper ROM for normal function with   [] LE / lumbar: self care, mobility, lifting and ambulation. [x] UE / Cervical: self care, reaching, carrying, lifting, house/yardwork, driving, computer work. Modalities:  [] (62480) Vasopneumatic compression: Utilized vasopneumatic compression to decrease edema / swelling for the purpose of improving mobility and quad tone / recruitment which will allow for increased overall function including but not limited to self-care, transfers, ambulation, and ascending / descending stairs.        Charges:  Timed Code Treatment Minutes: 44   Total Treatment Minutes: 52     [] EVAL - LOW (03138)   [] EVAL - MOD (82568)  [] EVAL - HIGH (05658)  [] RE-EVAL (29550)  [x] XE(89832) x 2     [] Ionto  [] NMR (82787) x       [] Vaso  [x] Manual (85953) x 1      [] Ultrasound  [] TA x        [] Mech Traction (87569)  [] Aquatic Therapy x     [] ES (un) (19982):   [] Home Management Training x  [] ES(attended) (50453)   [] Dry Needling 1-2 muscles (86354):  [] Dry Needling 3+ muscles (762123)  [] Group:      [] Other:     GOALS: Patient stated goal: Reduce shoulder pain   [] Progressing: [] Met: [] Not Met: [] Adjusted     Therapist goals for Patient:   Short Term Goals: To be achieved in: 2 weeks  1. Independent in HEP and progression per patient tolerance, in order to prevent re-injury. [] Progressing: [] Met: [] Not Met: [] Adjusted  2. Patient will have a decrease in pain to facilitate improvement in movement, function, and ADLs as indicated by Functional Deficits. [] Progressing: [] Met: [] Not Met: [] Adjusted     Long Term Goals: To be achieved in: 5 weeks  1. Pt will improve QDASH score by 10 points to reduce disability and progress towards PLOF. [] Progressing: [] Met: [] Not Met: [] Adjusted  2. Patient will demonstrate increased L shoulder flexion and abduction AROM to at least 140 deg to allow for proper joint functioning when reaching for items on an overhead shelf. [] Progressing: [] Met: [] Not Met: [] Adjusted  3. Patient will demonstrate an increase in gross L shoulder strength to 4+/5 to improve pt ability to pull a heavy item (at least 10#) out of a cupboard. [] Progressing: [] Met: [] Not Met: [] Adjusted    Overall Progression Towards Functional goals/ Treatment Progress Update:  [] Patient is progressing as expected towards functional goals listed. [] Progression is slowed due to complexities/Impairments listed. [] Progression has been slowed due to co-morbidities.   [x] Plan just implemented, too soon to assess goals progression <30days   [] Goals require adjustment due to lack of progress  [] Patient is not progressing as expected and requires additional follow up with physician  [] Other    Persisting Functional Limitations/Impairments:  []Sleeping []Sitting               []Standing []Transfers        []Walking []Kneeling               []Stairs []Squatting / bending   []ADLs [x]Reaching  [x]Lifting  []Housework  []Driving []Job related tasks  []Sports/Recreation []Other:        ASSESSMENT:  Pt demo's poor scap recruitment and compensates instead with UTs. Better scap recruitment with seated active horiz abd but still relied on UTs. Pt to benefit from further postural work as well as shoulder strengthening to reduce pain and improve pt function. Treatment/Activity Tolerance:  [x] Patient able to complete tx [] Patient limited by fatigue  [] Patient limited by pain  [] Patient limited by other medical complications  [] Other:     Prognosis: [x] Good [] Fair  [] Poor    Patient Requires Follow-up: [x] Yes  [] No    Plan for next treatment session:L shoulder ROM and strength, posture, decreased reliance on UTs    PLAN: See jason. PT 2x / week for 5 weeks. [x] Continue per plan of care [] Alter current plan (see comments)  [] Plan of care initiated [] Hold pending MD visit [] Discharge    Electronically signed by: Stevie Kay, PT PT, DPT    Note: If patient does not return for scheduled/ recommended follow up visits, this note will serve as a discharge from care along with most recent update on progress.

## 2023-01-16 ENCOUNTER — APPOINTMENT (OUTPATIENT)
Dept: PHYSICAL THERAPY | Age: 78
End: 2023-01-16
Payer: MEDICARE

## 2023-01-17 ENCOUNTER — HOSPITAL ENCOUNTER (OUTPATIENT)
Dept: PHYSICAL THERAPY | Age: 78
Setting detail: THERAPIES SERIES
Discharge: HOME | End: 2023-01-17
Payer: MEDICARE

## 2023-01-17 PROCEDURE — 97110 THERAPEUTIC EXERCISES: CPT

## 2023-01-17 NOTE — FLOWSHEET NOTE
[address] [facility] Physical Therapy  Phone: [phone]   Fax: [phone]    Physical Therapy Daily Treatment Note    Date:  2023     Patient Name:  Jose Ballard    :  1945  MRN: [id number]  Medical Diagnosis:  Rotator cuff tendinitis, left [M75.82]  Treatment Diagnosis: L shoulder pain with decreased ROM and weakness, difficulty reaching overhead  Insurance/Certification information:  PT Insurance Information: Medicare. Secondary:   Physician Information:  Anna Holley MD    Plan of care signed (Y/N): [x]  Yes []  No     Date of Patient follow up with Physician:      Progress Report: []  Yes  [x]  No     Date Range for reporting period:  Beginnin/10/2023  Ending:     Progress report due (10 Rx/or 30 days whichever is less): visit #10 or       Recertification due (POC duration/ or 90 days whichever is less): visit #12 or 23     Visit # Insurance Allowable Auth required? Date Range   3/12 BMN []  Yes  [x]  No N/a         Latex Allergy:  [x]NO      []YES  Preferred Language for Healthcare:   [x]English       []other:    Functional Scale:       Date assessed:  QDASH: raw score = 25; dysfunction = 32%  1/10/23    Pain level:  0/10     SUBJECTIVE: Pt reports she needs to leave 15 min early today to get to another appointment. Pain has been really good this past weekend.      OBJECTIVE: : PROM L shoulder flexion in reclined sitting to 170 deg       RESTRICTIONS/PRECAUTIONS: None    Exercises/Interventions:     Therapeutic Exercises (66020) Resistance / level Sets/sec Reps Notes   UBE  2 min ea way     Wall slides  - flexion  - scaption  - rainbows with red swiss ball     15            CC  - mid rows  - LPD  - high rows   2pl / 20#  2pl / 20#  2pl / 20#    20  20  20 : Started with very low weight to ensure proper form  : Long bar used           Reclined AAROM with wand    - flexion  - abd Table at 35 deg incline Seated active horiz abd    10 B    SOS IR behind the back stretch  10\" hold 5 L Added 1/17   PRE sharon flexion to 90 (palms down) 2# B  15 B Added 1/17   PRE sharon abd to 90 (palms down) 2# B  15 B Added 1/17          Therapeutic Activities (32022)                                   Gait (58355)                                   Neuromuscular Re-ed (85832)       Scap ball on wall  Gold med ball   20 ea  Up/down, R/L, CW, CCW                                       Manual Intervention (59006)       PROM L shoulder all directions with DTM to L UT In reclined sitting                                             Modalities: 1/12: CP to L shoulder in reclined sitting x 10 min     Pt. Education:  01/10/2023  -patient educated on diagnosis, prognosis and expectations for rehab  -all patient questions were answered    Home Exercise Program:  Access Code: [email or web address]  URL: [email or web address]  Date: 01/10/2023  Prepared by: Mona Row     Exercises  Seated Scapular Retraction - 1 x daily - 7 x weekly - 2 sets - 10 reps  Shoulder Flexion Wall Slide with Towel - 1 x daily - 7 x weekly - 2 sets - 10 reps  Scaption Wall Slide with Towel - 1 x daily - 7 x weekly - 2 sets - 10 reps  Seated Shoulder Flexion - 1 x daily - 7 x weekly - 2 sets - 10 reps  Seated Shoulder Abduction - Palms Down - 1 x daily - 7 x weekly - 2 sets - 10 reps  Doorway Pec Stretch at 60 Elevation - 1 x daily - 7 x weekly - 1 sets - 3 reps - 30 seconds hold      Therapeutic Exercise and NMR EXR  [x] (41840) Provided verbal/tactile cueing for activities related to strengthening, flexibility, endurance, ROM for improvements in  [] LE / Lumbar: LE, proximal hip, and core control with self care, mobility, lifting, ambulation.   [x] UE / Cervical: cervical, postural, scapular, scapulothoracic and UE control with self care, reaching, carrying, lifting, house/yardwork, driving, computer work.  [] (28079) Provided verbal/tactile cueing for activities related to improving balance, coordination, kinesthetic sense, posture, motor skill, proprioception to assist with   [] LE / lumbar: LE, proximal hip, and core control in self care, mobility, lifting, ambulation and eccentric single leg control. [] UE / cervical: cervical, scapular, scapulothoracic and UE control with self care, reaching, carrying, lifting, house/yardwork, driving, computer work.   [] (83397) Therapist is in constant attendance of 2 or more patients providing skilled therapy interventions, but not providing any significant amount of measurable one-on-one time to either patient, for improvements in  [] LE / lumbar: LE, proximal hip, and core control in self care, mobility, lifting, ambulation and eccentric single leg control. [] UE / cervical: cervical, scapular, scapulothoracic and UE control with self care, reaching, carrying, lifting, house/yardwork, driving, computer work.      NMR and Therapeutic Activities:    [] (72690 or 11379) Provided verbal/tactile cueing for activities related to improving balance, coordination, kinesthetic sense, posture, motor skill, proprioception and motor activation to allow for proper function of   [] LE: / Lumbar core, proximal hip and LE with self care and ADLs  [] UE / Cervical: cervical, postural, scapular, scapulothoracic and UE control with self care, carrying, lifting, driving, computer work.   [] (14269) Gait Re-education- Provided training and instruction to the patient for proper LE, core and proximal hip recruitment and positioning and eccentric body weight control with ambulation re-education including up and down stairs     Home Management Training / Self Care:  [] (80397) Provided self-care/home management training related to activities of daily living and compensatory training, and/or use of adaptive equipment for improvement with: ADLs and compensatory training, meal preparation, safety procedures and instruction in use of adaptive equipment, including bathing, grooming, dressing, personal hygiene, basic household cleaning and chores. Home Exercise Program:    [] (49000) Reviewed/Progressed HEP activities related to strengthening, flexibility, endurance, ROM of   [] LE / Lumbar: core, proximal hip and LE for functional self-care, mobility, lifting and ambulation/stair navigation   [] UE / Cervical: cervical, postural, scapular, scapulothoracic and UE control with self care, reaching, carrying, lifting, house/yardwork, driving, computer work  [] (84013)Reviewed/Progressed HEP activities related to improving balance, coordination, kinesthetic sense, posture, motor skill, proprioception of   [] LE: core, proximal hip and LE for self care, mobility, lifting, and ambulation/stair navigation    [] UE / Cervical: cervical, postural,  scapular, scapulothoracic and UE control with self care, reaching, carrying, lifting, house/yardwork, driving, computer work    Manual Treatments:  PROM / STM / Oscillations-Mobs:  G-I, II, III, IV (PA's, Inf., Post.)  [x] (98743) Provided manual therapy to mobilize LE, proximal hip and/or LS spine soft tissue/joints for the purpose of modulating pain, promoting relaxation,  increasing ROM, reducing/eliminating soft tissue swelling/inflammation/restriction, improving soft tissue extensibility and allowing for proper ROM for normal function with   [] LE / lumbar: self care, mobility, lifting and ambulation. [x] UE / Cervical: self care, reaching, carrying, lifting, house/yardwork, driving, computer work. Modalities:  [] (19559) Vasopneumatic compression: Utilized vasopneumatic compression to decrease edema / swelling for the purpose of improving mobility and quad tone / recruitment which will allow for increased overall function including but not limited to self-care, transfers, ambulation, and ascending / descending stairs.        Charges:  Timed Code Treatment Minutes: 30   Total Treatment Minutes: 30     [] EVAL - LOW (30302) [] EVAL - MOD (39498)  [] EVAL - HIGH (92118)  [] RE-EVAL (88494)  [x] KB(37984) x 2     [] Ionto  [] NMR (31656) x       [] Vaso  [] Manual (22607) x 1      [] Ultrasound  [] TA x        [] Mech Traction (31842)  [] Aquatic Therapy x     [] ES (un) (83324):   [] Home Management Training x  [] ES(attended) (44914)   [] Dry Needling 1-2 muscles (27193):  [] Dry Needling 3+ muscles (581375)  [] Group:      [] Other:     GOALS: Patient stated goal: Reduce shoulder pain   [] Progressing: [] Met: [] Not Met: [] Adjusted     Therapist goals for Patient:   Short Term Goals: To be achieved in: 2 weeks  1. Independent in HEP and progression per patient tolerance, in order to prevent re-injury. [] Progressing: [] Met: [] Not Met: [] Adjusted  2. Patient will have a decrease in pain to facilitate improvement in movement, function, and ADLs as indicated by Functional Deficits. [] Progressing: [] Met: [] Not Met: [] Adjusted     Long Term Goals: To be achieved in: 5 weeks  1. Pt will improve QDASH score by 10 points to reduce disability and progress towards PLOF. [] Progressing: [] Met: [] Not Met: [] Adjusted  2. Patient will demonstrate increased L shoulder flexion and abduction AROM to at least 140 deg to allow for proper joint functioning when reaching for items on an overhead shelf. [] Progressing: [] Met: [] Not Met: [] Adjusted  3. Patient will demonstrate an increase in gross L shoulder strength to 4+/5 to improve pt ability to pull a heavy item (at least 10#) out of a cupboard. [] Progressing: [] Met: [] Not Met: [] Adjusted    Overall Progression Towards Functional goals/ Treatment Progress Update:  [] Patient is progressing as expected towards functional goals listed. [] Progression is slowed due to complexities/Impairments listed. [] Progression has been slowed due to co-morbidities.   [x] Plan just implemented, too soon to assess goals progression <30days   [] Goals require adjustment due to lack of progress  [] Patient is not progressing as expected and requires additional follow up with physician  [] Other    Persisting Functional Limitations/Impairments:  []Sleeping []Sitting               []Standing []Transfers        []Walking []Kneeling               []Stairs []Squatting / bending   []ADLs [x]Reaching  [x]Lifting  []Housework  []Driving []Job related tasks  []Sports/Recreation []Other:        ASSESSMENT:  Shortened session today per pt request. Pt reports she has been really working on scap squeezes at home with help of  to make sure she is not compensating, and today she was able to perform CC activities with better form with use of longer handle and wider . Pt to benefit from continued skilled PT to improve functional strength and posture of L shoulder to return to PLOF. Treatment/Activity Tolerance:  [x] Patient able to complete tx [] Patient limited by fatigue  [] Patient limited by pain  [] Patient limited by other medical complications  [] Other:     Prognosis: [x] Good [] Fair  [] Poor    Patient Requires Follow-up: [x] Yes  [] No    Plan for next treatment session:L shoulder ROM and strength, posture, decreased reliance on UTs    PLAN: See jason. PT 2x / week for 5 weeks. [x] Continue per plan of care [] Alter current plan (see comments)  [] Plan of care initiated [] Hold pending MD visit [] Discharge    Electronically signed by: Hayder Grewal, PT PT, DPT    Note: If patient does not return for scheduled/ recommended follow up visits, this note will serve as a discharge from care along with most recent update on progress.

## 2023-01-19 ENCOUNTER — HOSPITAL ENCOUNTER (OUTPATIENT)
Dept: PHYSICAL THERAPY | Age: 78
Setting detail: THERAPIES SERIES
Discharge: HOME | End: 2023-01-19
Payer: MEDICARE

## 2023-01-19 PROCEDURE — 97110 THERAPEUTIC EXERCISES: CPT

## 2023-01-19 PROCEDURE — 97140 MANUAL THERAPY 1/> REGIONS: CPT

## 2023-01-19 NOTE — FLOWSHEET NOTE
[address] [facility] Physical Therapy  Phone: [phone]   Fax: [phone]    Physical Therapy Daily Treatment Note    Date:  2023     Patient Name:  Nazanin Mensah    :  1945  MRN: [id number]  Medical Diagnosis:  Rotator cuff tendinitis, left [M75.82]  Treatment Diagnosis: L shoulder pain with decreased ROM and weakness, difficulty reaching overhead  Insurance/Certification information:  PT Insurance Information: Medicare. Secondary:   Physician Information:  Alea Hobson MD    Plan of care signed (Y/N): [x]  Yes []  No     Date of Patient follow up with Physician:      Progress Report: []  Yes  [x]  No     Date Range for reporting period:  Beginnin/10/2023  Ending:     Progress report due (10 Rx/or 30 days whichever is less): visit #10 or       Recertification due (POC duration/ or 90 days whichever is less): visit #12 or 23     Visit # Insurance Allowable Auth required? Date Range    BMN []  Yes  [x]  No N/a         Latex Allergy:  [x]NO      []YES  Preferred Language for Healthcare:   [x]English       []other:    Functional Scale:       Date assessed:  QDASH: raw score = 25; dysfunction = 32%  1/10/23    Pain level:  0/10     SUBJECTIVE: Pt reports after the short session last visit and then rushing around all day her shoulder was very fatigued. No pain today.      OBJECTIVE: : PROM L shoulder flexion in reclined sitting to 170 deg       RESTRICTIONS/PRECAUTIONS: None    Exercises/Interventions:     Therapeutic Exercises (82140) Resistance / level Sets/sec Reps Notes   UBE  3 min fwd/2 min retro      Wall slides  - flexion  - scaption  - rainbows with red swiss ball               CC  - mid rows  - LPD  - high rows  - ER   2pl / 20#  2pl / 20#  2pl / 20#  5#    20  20  20  10 : Started with very low weight to ensure proper form  : Long bar used           Reclined AAROM with wand    - flexion  - abd Table at 35 deg incline         Seated active horiz abd    x10, x5    SOS IR behind the back stretch  30\" 2 L Added 1/17   PRE sharon flexion to 90 (palms down) 2# B 2 10 B Added 1/17   PRE sharon abd to 90 (palms down) 2# B 2 10 B Added 1/17                               Therapeutic Activities (01013)                                   Gait (72818)                                   Neuromuscular Re-ed (85397)       Scap ball on wall  Gold med ball   Up/down, R/L, CW, CCW                                       Manual Intervention (42143)       PROM L shoulder all directions with DTM to L UT In reclined sitting  9 min                                            Modalities: 1/12: CP to L shoulder in reclined sitting x 10 min     Pt. Education:  01/10/2023  -patient educated on diagnosis, prognosis and expectations for rehab  -all patient questions were answered    Home Exercise Program:  Access Code: [email or web address]  URL: [email or web address]  Date: 01/10/2023  Prepared by: Anthony Francisco  Seated Scapular Retraction - 1 x daily - 7 x weekly - 2 sets - 10 reps  Shoulder Flexion Wall Slide with Towel - 1 x daily - 7 x weekly - 2 sets - 10 reps  Scaption Wall Slide with Towel - 1 x daily - 7 x weekly - 2 sets - 10 reps  Seated Shoulder Flexion - 1 x daily - 7 x weekly - 2 sets - 10 reps  Seated Shoulder Abduction - Palms Down - 1 x daily - 7 x weekly - 2 sets - 10 reps  Doorway Pec Stretch at 60 Elevation - 1 x daily - 7 x weekly - 1 sets - 3 reps - 30 seconds hold      Therapeutic Exercise and NMR EXR  [x] (01150) Provided verbal/tactile cueing for activities related to strengthening, flexibility, endurance, ROM for improvements in  [] LE / Lumbar: LE, proximal hip, and core control with self care, mobility, lifting, ambulation.   [x] UE / Cervical: cervical, postural, scapular, scapulothoracic and UE control with self care, reaching, carrying, lifting, house/yardwork, driving, computer work.  [] (63471) Provided verbal/tactile cueing for activities related to improving balance, coordination, kinesthetic sense, posture, motor skill, proprioception to assist with   [] LE / lumbar: LE, proximal hip, and core control in self care, mobility, lifting, ambulation and eccentric single leg control. [] UE / cervical: cervical, scapular, scapulothoracic and UE control with self care, reaching, carrying, lifting, house/yardwork, driving, computer work.   [] (90506) Therapist is in constant attendance of 2 or more patients providing skilled therapy interventions, but not providing any significant amount of measurable one-on-one time to either patient, for improvements in  [] LE / lumbar: LE, proximal hip, and core control in self care, mobility, lifting, ambulation and eccentric single leg control. [] UE / cervical: cervical, scapular, scapulothoracic and UE control with self care, reaching, carrying, lifting, house/yardwork, driving, computer work.      NMR and Therapeutic Activities:    [] (16693 or 30231) Provided verbal/tactile cueing for activities related to improving balance, coordination, kinesthetic sense, posture, motor skill, proprioception and motor activation to allow for proper function of   [] LE: / Lumbar core, proximal hip and LE with self care and ADLs  [] UE / Cervical: cervical, postural, scapular, scapulothoracic and UE control with self care, carrying, lifting, driving, computer work.   [] (72860) Gait Re-education- Provided training and instruction to the patient for proper LE, core and proximal hip recruitment and positioning and eccentric body weight control with ambulation re-education including up and down stairs     Home Management Training / Self Care:  [] (18710) Provided self-care/home management training related to activities of daily living and compensatory training, and/or use of adaptive equipment for improvement with: ADLs and compensatory training, meal preparation, safety procedures and instruction in use of adaptive equipment, including bathing, grooming, dressing, personal hygiene, basic household cleaning and chores. Home Exercise Program:    [] (98843) Reviewed/Progressed HEP activities related to strengthening, flexibility, endurance, ROM of   [] LE / Lumbar: core, proximal hip and LE for functional self-care, mobility, lifting and ambulation/stair navigation   [] UE / Cervical: cervical, postural, scapular, scapulothoracic and UE control with self care, reaching, carrying, lifting, house/yardwork, driving, computer work  [] (77172)Reviewed/Progressed HEP activities related to improving balance, coordination, kinesthetic sense, posture, motor skill, proprioception of   [] LE: core, proximal hip and LE for self care, mobility, lifting, and ambulation/stair navigation    [] UE / Cervical: cervical, postural,  scapular, scapulothoracic and UE control with self care, reaching, carrying, lifting, house/yardwork, driving, computer work    Manual Treatments:  PROM / STM / Oscillations-Mobs:  G-I, II, III, IV (PA's, Inf., Post.)  [x] (53149) Provided manual therapy to mobilize LE, proximal hip and/or LS spine soft tissue/joints for the purpose of modulating pain, promoting relaxation,  increasing ROM, reducing/eliminating soft tissue swelling/inflammation/restriction, improving soft tissue extensibility and allowing for proper ROM for normal function with   [] LE / lumbar: self care, mobility, lifting and ambulation. [x] UE / Cervical: self care, reaching, carrying, lifting, house/yardwork, driving, computer work. Modalities:  [] (75850) Vasopneumatic compression: Utilized vasopneumatic compression to decrease edema / swelling for the purpose of improving mobility and quad tone / recruitment which will allow for increased overall function including but not limited to self-care, transfers, ambulation, and ascending / descending stairs.        Charges:  Timed Code Treatment Minutes: 42   Total Treatment Minutes: 59 [] EVAL - LOW (91637)   [] EVAL - MOD (82224)  [] EVAL - HIGH (41608)  [] RE-EVAL (75597)  [x] WA(84616) x 2     [] Ionto  [] NMR (41414) x       [] Vaso  [x] Manual (69901) x 1      [] Ultrasound  [] TA x        [] Mech Traction (62273)  [] Aquatic Therapy x     [] ES (un) (84616):   [] Home Management Training x  [] ES(attended) (08919)   [] Dry Needling 1-2 muscles (40893):  [] Dry Needling 3+ muscles (339356)  [] Group:      [] Other:     GOALS: Patient stated goal: Reduce shoulder pain   [] Progressing: [] Met: [] Not Met: [] Adjusted     Therapist goals for Patient:   Short Term Goals: To be achieved in: 2 weeks  1. Independent in HEP and progression per patient tolerance, in order to prevent re-injury. [] Progressing: [] Met: [] Not Met: [] Adjusted  2. Patient will have a decrease in pain to facilitate improvement in movement, function, and ADLs as indicated by Functional Deficits. [] Progressing: [] Met: [] Not Met: [] Adjusted     Long Term Goals: To be achieved in: 5 weeks  1. Pt will improve QDASH score by 10 points to reduce disability and progress towards PLOF. [] Progressing: [] Met: [] Not Met: [] Adjusted  2. Patient will demonstrate increased L shoulder flexion and abduction AROM to at least 140 deg to allow for proper joint functioning when reaching for items on an overhead shelf. [] Progressing: [] Met: [] Not Met: [] Adjusted  3. Patient will demonstrate an increase in gross L shoulder strength to 4+/5 to improve pt ability to pull a heavy item (at least 10#) out of a cupboard. [] Progressing: [] Met: [] Not Met: [] Adjusted    Overall Progression Towards Functional goals/ Treatment Progress Update:  [] Patient is progressing as expected towards functional goals listed. [] Progression is slowed due to complexities/Impairments listed. [] Progression has been slowed due to co-morbidities.   [x] Plan just implemented, too soon to assess goals progression <30days   [] Goals require adjustment due to lack of progress  [] Patient is not progressing as expected and requires additional follow up with physician  [] Other    Persisting Functional Limitations/Impairments:  []Sleeping []Sitting               []Standing []Transfers        []Walking []Kneeling               []Stairs []Squatting / bending   []ADLs [x]Reaching  [x]Lifting  []Housework  []Driving []Job related tasks  []Sports/Recreation []Other:        ASSESSMENT:  Pt did well with session today and required less cues for form. She demos near full ROM with passive L ER and abd, and full ROM with flexion. Will benefit from continued therapy for scap strengthening and to avoid compensation with UTs, but is progressing well overall. Treatment/Activity Tolerance:  [x] Patient able to complete tx [] Patient limited by fatigue  [] Patient limited by pain  [] Patient limited by other medical complications  [] Other:     Prognosis: [x] Good [] Fair  [] Poor    Patient Requires Follow-up: [x] Yes  [] No    Plan for next treatment session:L shoulder ROM and strength, posture, decreased reliance on UTs    PLAN: See saturnino PT 2x / week for 5 weeks. [x] Continue per plan of care [] Alter current plan (see comments)  [] Plan of care initiated [] Hold pending MD visit [] Discharge    Electronically signed by: Jesús Bacon, PT PT, DPT    Note: If patient does not return for scheduled/ recommended follow up visits, this note will serve as a discharge from care along with most recent update on progress.

## 2023-01-23 RX ORDER — LEVOTHYROXINE SODIUM 88 MCG
TABLET ORAL
Qty: 90 TABLET | Refills: 1 | Status: SHIPPED | OUTPATIENT
Start: 2023-01-23 | End: 2023-01-23 | Stop reason: SDUPTHER

## 2023-01-23 RX ORDER — LEVOTHYROXINE SODIUM 88 MCG
TABLET ORAL
Qty: 30 TABLET | Refills: 0 | Status: SHIPPED | OUTPATIENT
Start: 2023-01-23

## 2023-01-23 NOTE — TELEPHONE ENCOUNTER
Patient is requesting a refill on the following medication:   SYNTHROID 88 MCG tablet   Last appointment: 11/11/2022  Next appointment: 5/16/2023  Last refill: 07/18/2022      Patient is requesting a 7-10 day supply be called in to:  [facility] [address] [place] [address] [phone] - f [phone]     She is requesting a 90 day supply be called in to:  [address], [place] [phone] - f [phone]

## 2023-01-24 ENCOUNTER — HOSPITAL ENCOUNTER (OUTPATIENT)
Dept: PHYSICAL THERAPY | Age: 78
Setting detail: THERAPIES SERIES
Discharge: HOME | End: 2023-01-24
Payer: MEDICARE

## 2023-01-24 PROCEDURE — 97112 NEUROMUSCULAR REEDUCATION: CPT

## 2023-01-24 PROCEDURE — 97110 THERAPEUTIC EXERCISES: CPT

## 2023-01-24 NOTE — TELEPHONE ENCOUNTER
Both orders sent yesterday and receipt acknowledged, but will resend if pharmacy notifies us that order not received.

## 2023-01-24 NOTE — TELEPHONE ENCOUNTER
30 day was sent to local the pt will need the remaining 60 days to sent to mail pharmacy     90 day was [facility] to [facility]

## 2023-01-24 NOTE — TELEPHONE ENCOUNTER
Patient is calling back to check on the status of this request. She has been made aware that a 30 day supply was called in to [facility] but is wondering if the remaining 60 day supply will be called in to [facility]? A 90 day supply was supposed to be called in to [facility].

## 2023-01-24 NOTE — FLOWSHEET NOTE
[address] [facility]  Phone: [phone]   Fax: [phone]    Physical Therapy Daily Treatment Note    Date:  2023     Patient Name:  Marcelino Rai    :  1945  MRN: [id number]  Medical Diagnosis:  Rotator cuff tendinitis, left [M75.82]  Treatment Diagnosis: L shoulder pain with decreased ROM and weakness, difficulty reaching overhead  Insurance/Certification information:  PT Insurance Information: Medicare. Secondary:   Physician Information:  Arun Fink MD    Plan of care signed (Y/N): [x]  Yes []  No     Date of Patient follow up with Physician:      Progress Report: []  Yes  [x]  No     Date Range for reporting period:  Beginnin/10/2023  Ending:     Progress report due (10 Rx/or 30 days whichever is less): visit #10 or       Recertification due (POC duration/ or 90 days whichever is less): visit #12 or 23     Visit # Insurance Allowable Auth required? Date Range    BMN []  Yes  [x]  No N/a         Latex Allergy:  [x]NO      []YES  Preferred Language for Healthcare:   [x]English       []other:    Functional Scale:       Date assessed:  QDASH: raw score = 25; dysfunction = 32%  1/10/23    Pain level:  0/10     SUBJECTIVE: Pt reports she is doing very well today, the shoulder continues to be pain free.      OBJECTIVE: : PROM L shoulder flexion in reclined sitting to 170 deg       RESTRICTIONS/PRECAUTIONS: None    Exercises/Interventions:     Therapeutic Exercises (54972) Resistance / level Sets/sec Reps Notes   UBE  4 min fwd     Wall slides  - flexion  - scaption  - rainbows with red swiss ball               CC  - mid rows  - LPD  - high rows  - ER   2pl / 20#  2pl / 20#  2pl / 20#  5#    20  20  15  : Started with very low weight to ensure proper form  : Long bar used           Reclined AAROM with wand    - flexion  - abd Table at 35 deg incline         Seated active horiz abd       SOS IR behind the back stretch  30\" 2 L Added 1/17   PRE sharon flexion to 90 (palms down) 3# B 2 10 B Added 1/17   PRE sharon abd to 90 (palms down) 3# B 2 10 B Added 1/17   Ballet barre push ups  1 10                         Therapeutic Activities (90525)                                   Gait (02020)                                   Neuromuscular Re-ed (16788)       Scap ball on wall  Gold med ball   30 ea  Up/down, R/L, CW, CCW    Shoulder flexion on wall with swiss ball + pulses off ball Red swiss ball 5 pulses 10 x    Thread the needle  Ballet barre   10 B                         Manual Intervention (50626)       PROM L shoulder all directions with DTM to L UT                                            Modalities: 1/12: CP to L shoulder in reclined sitting x 10 min     Pt. Education:  01/10/2023  -patient educated on diagnosis, prognosis and expectations for rehab  -all patient questions were answered    Home Exercise Program:  Access Code: [email or web address]  URL: [email or web address]  Date: 01/10/2023  Prepared by: Susan Harness     Exercises  Seated Scapular Retraction - 1 x daily - 7 x weekly - 2 sets - 10 reps  Shoulder Flexion Wall Slide with Towel - 1 x daily - 7 x weekly - 2 sets - 10 reps  Scaption Wall Slide with Towel - 1 x daily - 7 x weekly - 2 sets - 10 reps  Seated Shoulder Flexion - 1 x daily - 7 x weekly - 2 sets - 10 reps  Seated Shoulder Abduction - Palms Down - 1 x daily - 7 x weekly - 2 sets - 10 reps  Doorway Pec Stretch at 60 Elevation - 1 x daily - 7 x weekly - 1 sets - 3 reps - 30 seconds hold      Therapeutic Exercise and NMR EXR  [x] (26565) Provided verbal/tactile cueing for activities related to strengthening, flexibility, endurance, ROM for improvements in  [] LE / Lumbar: LE, proximal hip, and core control with self care, mobility, lifting, ambulation.   [x] UE / Cervical: cervical, postural, scapular, scapulothoracic and UE control with self care, reaching, carrying, lifting, house/yardwork, driving, computer work.  [] (43184) Provided verbal/tactile cueing for activities related to improving balance, coordination, kinesthetic sense, posture, motor skill, proprioception to assist with   [] LE / lumbar: LE, proximal hip, and core control in self care, mobility, lifting, ambulation and eccentric single leg control. [] UE / cervical: cervical, scapular, scapulothoracic and UE control with self care, reaching, carrying, lifting, house/yardwork, driving, computer work.   [] (25080) Therapist is in constant attendance of 2 or more patients providing skilled therapy interventions, but not providing any significant amount of measurable one-on-one time to either patient, for improvements in  [] LE / lumbar: LE, proximal hip, and core control in self care, mobility, lifting, ambulation and eccentric single leg control. [] UE / cervical: cervical, scapular, scapulothoracic and UE control with self care, reaching, carrying, lifting, house/yardwork, driving, computer work.      NMR and Therapeutic Activities:    [] (22342 or 00526) Provided verbal/tactile cueing for activities related to improving balance, coordination, kinesthetic sense, posture, motor skill, proprioception and motor activation to allow for proper function of   [] LE: / Lumbar core, proximal hip and LE with self care and ADLs  [] UE / Cervical: cervical, postural, scapular, scapulothoracic and UE control with self care, carrying, lifting, driving, computer work.   [] (87292) Gait Re-education- Provided training and instruction to the patient for proper LE, core and proximal hip recruitment and positioning and eccentric body weight control with ambulation re-education including up and down stairs     Home Management Training / Self Care:  [] (18321) Provided self-care/home management training related to activities of daily living and compensatory training, and/or use of adaptive equipment for improvement with: ADLs and compensatory training, meal preparation, safety procedures and instruction in use of adaptive equipment, including bathing, grooming, dressing, personal hygiene, basic household cleaning and chores. Home Exercise Program:    [] (45998) Reviewed/Progressed HEP activities related to strengthening, flexibility, endurance, ROM of   [] LE / Lumbar: core, proximal hip and LE for functional self-care, mobility, lifting and ambulation/stair navigation   [] UE / Cervical: cervical, postural, scapular, scapulothoracic and UE control with self care, reaching, carrying, lifting, house/yardwork, driving, computer work  [] (26011)Reviewed/Progressed HEP activities related to improving balance, coordination, kinesthetic sense, posture, motor skill, proprioception of   [] LE: core, proximal hip and LE for self care, mobility, lifting, and ambulation/stair navigation    [] UE / Cervical: cervical, postural,  scapular, scapulothoracic and UE control with self care, reaching, carrying, lifting, house/yardwork, driving, computer work    Manual Treatments:  PROM / STM / Oscillations-Mobs:  G-I, II, III, IV (PA's, Inf., Post.)  [x] (74723) Provided manual therapy to mobilize LE, proximal hip and/or LS spine soft tissue/joints for the purpose of modulating pain, promoting relaxation,  increasing ROM, reducing/eliminating soft tissue swelling/inflammation/restriction, improving soft tissue extensibility and allowing for proper ROM for normal function with   [] LE / lumbar: self care, mobility, lifting and ambulation. [x] UE / Cervical: self care, reaching, carrying, lifting, house/yardwork, driving, computer work. Modalities:  [] (39023) Vasopneumatic compression: Utilized vasopneumatic compression to decrease edema / swelling for the purpose of improving mobility and quad tone / recruitment which will allow for increased overall function including but not limited to self-care, transfers, ambulation, and ascending / descending stairs.        Charges:  Timed Code Treatment Minutes: 45   Total Treatment Minutes: 45     [] EVAL - LOW (87376)   [] EVAL - MOD (24046)  [] EVAL - HIGH (38900)  [] RE-EVAL (66022)  [x] YZ(97107) x 2     [] Ionto  [x] NMR (72332) x  1     [] Vaso  [] Manual (83357) x       [] Ultrasound  [] TA x        [] Mech Traction (77470)  [] Aquatic Therapy x     [] ES (un) (22624):   [] Home Management Training x  [] ES(attended) (33744)   [] Dry Needling 1-2 muscles (22075):  [] Dry Needling 3+ muscles (868473)  [] Group:      [] Other:     GOALS: Patient stated goal: Reduce shoulder pain   [] Progressing: [] Met: [] Not Met: [] Adjusted     Therapist goals for Patient:   Short Term Goals: To be achieved in: 2 weeks  1. Independent in HEP and progression per patient tolerance, in order to prevent re-injury. [] Progressing: [] Met: [] Not Met: [] Adjusted  2. Patient will have a decrease in pain to facilitate improvement in movement, function, and ADLs as indicated by Functional Deficits. [] Progressing: [] Met: [] Not Met: [] Adjusted     Long Term Goals: To be achieved in: 5 weeks  1. Pt will improve QDASH score by 10 points to reduce disability and progress towards PLOF. [] Progressing: [] Met: [] Not Met: [] Adjusted  2. Patient will demonstrate increased L shoulder flexion and abduction AROM to at least 140 deg to allow for proper joint functioning when reaching for items on an overhead shelf. [] Progressing: [] Met: [] Not Met: [] Adjusted  3. Patient will demonstrate an increase in gross L shoulder strength to 4+/5 to improve pt ability to pull a heavy item (at least 10#) out of a cupboard. [] Progressing: [] Met: [] Not Met: [] Adjusted    Overall Progression Towards Functional goals/ Treatment Progress Update:  [] Patient is progressing as expected towards functional goals listed. [] Progression is slowed due to complexities/Impairments listed. [] Progression has been slowed due to co-morbidities.   [x] Plan just implemented, too soon to assess goals progression <30days   [] Goals require adjustment due to lack of progress  [] Patient is not progressing as expected and requires additional follow up with physician  [] Other    Persisting Functional Limitations/Impairments:  []Sleeping []Sitting               []Standing []Transfers        []Walking []Kneeling               []Stairs []Squatting / bending   []ADLs [x]Reaching  [x]Lifting  []Housework  []Driving []Job related tasks  []Sports/Recreation []Other:        ASSESSMENT:  Pt continues to be pain free and tolerating exercises well. She does still require cueing to not over-engage her UTs. Dropped frequency to 1x/wk and if pt continues to be pain free may DC early. Treatment/Activity Tolerance:  [x] Patient able to complete tx [] Patient limited by fatigue  [] Patient limited by pain  [] Patient limited by other medical complications  [] Other:     Prognosis: [x] Good [] Fair  [] Poor    Patient Requires Follow-up: [x] Yes  [] No    Plan for next treatment session:L shoulder ROM and strength, posture, decreased reliance on UTs    PLAN: See jason. PT 2x / week for 5 weeks. [x] Continue per plan of care [] Alter current plan (see comments)  [] Plan of care initiated [] Hold pending MD visit [] Discharge    Electronically signed by: Sheila Christiansen, PT PT, DPT    Note: If patient does not return for scheduled/ recommended follow up visits, this note will serve as a discharge from care along with most recent update on progress.

## 2023-01-26 ENCOUNTER — APPOINTMENT (OUTPATIENT)
Dept: PHYSICAL THERAPY | Age: 78
End: 2023-01-26
Payer: MEDICARE

## 2023-01-31 ENCOUNTER — HOSPITAL ENCOUNTER (OUTPATIENT)
Dept: PHYSICAL THERAPY | Age: 78
Setting detail: THERAPIES SERIES
Discharge: HOME | End: 2023-01-31
Payer: MEDICARE

## 2023-01-31 PROCEDURE — 97110 THERAPEUTIC EXERCISES: CPT

## 2023-01-31 NOTE — PLAN OF CARE
[address] [facility]  Phone: [phone]   Fax: [phone]   Physical Therapy Discharge Summary    Dear CHELSEA Pa  ,    We had the pleasure of treating the following patient for physical therapy services at [facility]. A summary of our findings can be found in the discharge summary below. If you have any questions or concerns regarding these findings, please do not hesitate to contact me at the office phone number checked above. Thank you for the referral.     Physician Signature:________________________________Date:__________________  By signing above (or electronic signature), therapists plan is approved by physician      Functional Outcome:     QDASH: raw score = 14 (improvement from 25 at eval)     AROM     L R   Shoulder Flexion  140 140   Shoulder Abduction  146 145   Shoulder External Rotation  Fxl: T4-5 Fxl: T2-3   Shoulder Internal Rotation  Fxl: Opp scapula Fxl: apex of Opp scapula, lower thoracic spine    Elbow Flexion  WNL WNL   Elbow Extension  WNL WNL   Pronation  WNL WNL   Supination  WNL WNL     Strength (0-5) Left Right    Shoulder Flex 5 4+   Shoulder Abd (C5) 5 4+   Shoulder ER 4  compensates with bicep 4 compensates with bicep   Shoulder IR 5 5   Biceps (C6) 5 5   Triceps (C7) 4+ 5       Overall Response to Treatment:   []Patient is responding well to treatment and improvement is noted with regards  to goals   []Patient should continue to improve in reasonable time if they continue HEP   []Patient has plateaued and is no longer responding to skilled PT intervention    []Patient is getting worse and would benefit from return to referring MD   []Patient unable to adhere to initial POC   [x]Other: Patient referred to PT for L RTC tendinitis.  She received a cortisone injection just prior to starting therapy and has tolerated therapy very well, improving both ROM and strength of the L shoulder. She has completed 6 visits. She reports no significant difficulty with ADLs, housecleaning, or recreational tasks. Pt is comfortable with DC at this point and will continue on her own with [facility]. GOALS: Patient stated goal: Reduce shoulder pain   [] Progressing: [] Met: [] Not Met: [] Adjusted     Therapist goals for Patient:   Short Term Goals: To be achieved in: 2 weeks  1. Independent in [facility] and progression per patient tolerance, in order to prevent re-injury. [] Progressing: [x] Met: [] Not Met: [] Adjusted  2. Patient will have a decrease in pain to facilitate improvement in movement, function, and ADLs as indicated by Functional Deficits. [] Progressing: [x] Met: [] Not Met: [] Adjusted     Long Term Goals: To be achieved in: 5 weeks  1. Pt will improve QDASH score by 10 points to reduce disability and progress towards PLOF. [] Progressing: [x] Met: [] Not Met: [] Adjusted  2. Patient will demonstrate increased L shoulder flexion and abduction AROM to at least 140 deg to allow for proper joint functioning when reaching for items on an overhead shelf. [] Progressing: [x] Met: [] Not Met: [] Adjusted  3. Patient will demonstrate an increase in gross L shoulder strength to 4+/5 to improve pt ability to pull a heavy item (at least 10#) out of a cupboard. [] Progressing: [x] Met: except for L ER [] Not Met: [] Adjusted    Date range of Visits: 1/10/23 - 23  Total Visits: 6    Recommendation:    [x] Discharge to [facility]. Follow up with PT or MD PRN. Physical Therapy Daily Treatment Note    Date:  2023     Patient Name:  All De Dios    :  1945  MRN: [id number]  Medical Diagnosis:  Rotator cuff tendinitis, left [M75.82]  Treatment Diagnosis: L shoulder pain with decreased ROM and weakness, difficulty reaching overhead  Insurance/Certification information:  PT Insurance Information: Medicare.  Secondary:   Physician Information:  Bill Mccarthy MD Plan of care signed (Y/N): [x]  Yes []  No     Date of Patient follow up with Physician:      Progress Report: [x]  Yes  []  No     Date Range for reporting period:  Beginnin/10/2023  Endin2023    Progress report due (10 Rx/or 30 days whichever is less): visit #10 or       Recertification due (POC duration/ or 90 days whichever is less): visit #12 or 23     Visit # Insurance Allowable Auth required? Date Range    BMN []  Yes  [x]  No N/a     Latex Allergy:  [x]NO      []YES  Preferred Language for Healthcare:   [x]English       []other:    Functional Scale:       Date assessed:  QDASH: raw score = 25; dysfunction = 32%  1/10/23  QDASH: raw score = 14; dysfunction = 7%  23    Pain level:  0/10     SUBJECTIVE:  Patient would like to DC today.      OBJECTIVE: : PROM L shoulder flexion in reclined sitting to 170 deg       RESTRICTIONS/PRECAUTIONS: None    Exercises/Interventions:     Therapeutic Exercises (40091) Resistance / level Sets/sec Reps Notes   UBE      Wall slides  - flexion  - scaption  - rainbows with red swiss ball               CC  - mid rows  - LPD  - high rows  - ER   2pl / 20#  2pl / 20#  2pl / 20#  5#    : Started with very low weight to ensure proper form  : Long bar used           Reclined AAROM with wand    - flexion  - abd Table at 35 deg incline    Seated active horiz abd      SOS IR behind the back stretch  Added    PRE sharon flexion to 90 (palms down) 3# B Added    PRE sharon abd to 90 (palms down) 3# B Added    Ballet barre push ups            Assessment for DC and updating HEP              Therapeutic Activities (79171)                                   Gait (72555)                                   Neuromuscular Re-ed (43450)       Scap ball on wall  Gold med ball  Up/down, R/L, CW, CCW    Shoulder flexion on wall with swiss ball + pulses off ball Red swiss ball    Thread the needle  Ballet barre                          Manual Intervention ([phone])       PROM L shoulder all directions with DTM to L UT                                            Modalities: 1/12: CP to L shoulder in reclined sitting x 10 min     Pt. Education:  01/10/2023  -patient educated on diagnosis, prognosis and expectations for rehab  -all patient questions were answered    Home Exercise Program:  Access Code: [email or web address]  URL: [email or web address]  Date: 01/10/2023  Prepared by: María Elena Guerra     Exercises  Seated Scapular Retraction - 1 x daily - 7 x weekly - 2 sets - 10 reps  Shoulder Flexion Wall Slide with Towel - 1 x daily - 7 x weekly - 2 sets - 10 reps  Scaption Wall Slide with Towel - 1 x daily - 7 x weekly - 2 sets - 10 reps  Seated Shoulder Flexion - 1 x daily - 7 x weekly - 2 sets - 10 reps  Seated Shoulder Abduction - Palms Down - 1 x daily - 7 x weekly - 2 sets - 10 reps  Doorway Pec Stretch at 60 Elevation - 1 x daily - 7 x weekly - 1 sets - 3 reps - 30 seconds hold    Access Code: [email or web address]  URL: [email or web address]  Date: 01/31/2023  Prepared by: María Elena Guerra    Exercises  Standing Shoulder Row with Anchored Resistance - 1 x daily - 7 x weekly - 2 sets - 10 reps  Shoulder extension with resistance - Neutral - 1 x daily - 7 x weekly - 2 sets - 10 reps  Standing High Row with Resistance - 1 x daily - 7 x weekly - 2 sets - 10 reps    Therapeutic Exercise and NMR EXR  [x] (02564) Provided verbal/tactile cueing for activities related to strengthening, flexibility, endurance, ROM for improvements in  [] LE / Lumbar: LE, proximal hip, and core control with self care, mobility, lifting, ambulation.   [x] UE / Cervical: cervical, postural, scapular, scapulothoracic and UE control with self care, reaching, carrying, lifting, house/yardwork, driving, computer work.  [] (77105) Provided verbal/tactile cueing for activities related to improving balance, coordination, kinesthetic sense, posture, motor skill, proprioception to assist with   [] LE / lumbar: LE, proximal hip, and core control in self care, mobility, lifting, ambulation and eccentric single leg control. [] UE / cervical: cervical, scapular, scapulothoracic and UE control with self care, reaching, carrying, lifting, house/yardwork, driving, computer work.   [] (61806) Therapist is in constant attendance of 2 or more patients providing skilled therapy interventions, but not providing any significant amount of measurable one-on-one time to either patient, for improvements in  [] LE / lumbar: LE, proximal hip, and core control in self care, mobility, lifting, ambulation and eccentric single leg control. [] UE / cervical: cervical, scapular, scapulothoracic and UE control with self care, reaching, carrying, lifting, house/yardwork, driving, computer work. NMR and Therapeutic Activities:    [] (64520 or 51512) Provided verbal/tactile cueing for activities related to improving balance, coordination, kinesthetic sense, posture, motor skill, proprioception and motor activation to allow for proper function of   [] LE: / Lumbar core, proximal hip and LE with self care and ADLs  [] UE / Cervical: cervical, postural, scapular, scapulothoracic and UE control with self care, carrying, lifting, driving, computer work.   [] (29436) Gait Re-education- Provided training and instruction to the patient for proper LE, core and proximal hip recruitment and positioning and eccentric body weight control with ambulation re-education including up and down stairs     Home Management Training / Self Care:  [] (07176) Provided self-care/home management training related to activities of daily living and compensatory training, and/or use of adaptive equipment for improvement with: ADLs and compensatory training, meal preparation, safety procedures and instruction in use of adaptive equipment, including bathing, grooming, dressing, personal hygiene, basic household cleaning and chores.      Home Exercise Program:    [] (54564) Reviewed/Progressed HEP activities related to strengthening, flexibility, endurance, ROM of   [] LE / Lumbar: core, proximal hip and LE for functional self-care, mobility, lifting and ambulation/stair navigation   [] UE / Cervical: cervical, postural, scapular, scapulothoracic and UE control with self care, reaching, carrying, lifting, house/yardwork, driving, computer work  [] (51204)Reviewed/Progressed HEP activities related to improving balance, coordination, kinesthetic sense, posture, motor skill, proprioception of   [] LE: core, proximal hip and LE for self care, mobility, lifting, and ambulation/stair navigation    [] UE / Cervical: cervical, postural,  scapular, scapulothoracic and UE control with self care, reaching, carrying, lifting, house/yardwork, driving, computer work    Manual Treatments:  PROM / STM / Oscillations-Mobs:  G-I, II, III, IV (PA's, Inf., Post.)  [x] (45939) Provided manual therapy to mobilize LE, proximal hip and/or LS spine soft tissue/joints for the purpose of modulating pain, promoting relaxation,  increasing ROM, reducing/eliminating soft tissue swelling/inflammation/restriction, improving soft tissue extensibility and allowing for proper ROM for normal function with   [] LE / lumbar: self care, mobility, lifting and ambulation. [x] UE / Cervical: self care, reaching, carrying, lifting, house/yardwork, driving, computer work. Modalities:  [] (39784) Vasopneumatic compression: Utilized vasopneumatic compression to decrease edema / swelling for the purpose of improving mobility and quad tone / recruitment which will allow for increased overall function including but not limited to self-care, transfers, ambulation, and ascending / descending stairs.        Charges:  Timed Code Treatment Minutes: 25   Total Treatment Minutes: 25     [] EVAL - LOW (72447)   [] EVAL - MOD (66518)  [] EVAL - HIGH (65272)  [] RE-EVAL (39860)  [x] JA(97993) x 2     [] Ionto  [] NMR (76585) x [] Vaso  [] Manual (31325) x       [] Ultrasound  [] TA x        [] Mech Traction (34768)  [] Aquatic Therapy x     [] ES (un) (19569):   [] Home Management Training x  [] ES(attended) (18532)   [] Dry Needling 1-2 muscles (96957):  [] Dry Needling 3+ muscles (530673)  [] Group:      [] Other:       Overall Progression Towards Functional goals/ Treatment Progress Update:  [] Patient is progressing as expected towards functional goals listed. [] Progression is slowed due to complexities/Impairments listed. [] Progression has been slowed due to co-morbidities. [x] Plan just implemented, too soon to assess goals progression <30days   [] Goals require adjustment due to lack of progress  [] Patient is not progressing as expected and requires additional follow up with physician  [] Other    Persisting Functional Limitations/Impairments:  []Sleeping []Sitting               []Standing []Transfers        []Walking []Kneeling               []Stairs []Squatting / bending   []ADLs [x]Reaching  [x]Lifting  []Housework  []Driving []Job related tasks  []Sports/Recreation []Other:        ASSESSMENT:  DC completed - see above     Treatment/Activity Tolerance:  [x] Patient able to complete tx [] Patient limited by fatigue  [] Patient limited by pain  [] Patient limited by other medical complications  [] Other:     Prognosis: [x] Good [] Fair  [] Poor    Patient Requires Follow-up: [] Yes  [x] No    Plan for next treatment session:L shoulder ROM and strength, posture, decreased reliance on UTs    PLAN: See jason. PT 2x / week for 5 weeks. [] Continue per plan of care [] Alter current plan (see comments)  [] Plan of care initiated [] Hold pending MD visit [x] Discharge    Electronically signed by: Tati Ureña, PT PT, DPT    Note: If patient does not return for scheduled/ recommended follow up visits, this note will serve as a discharge from care along with most recent update on progress.

## 2023-05-16 ENCOUNTER — OFFICE VISIT (OUTPATIENT)
Dept: INTERNAL MEDICINE CLINIC | Age: 78
End: 2023-05-16

## 2023-05-16 VITALS
SYSTOLIC BLOOD PRESSURE: 138 MMHG | HEIGHT: 67 IN | HEART RATE: 72 BPM | WEIGHT: 218 LBS | BODY MASS INDEX: 34.21 KG/M2 | DIASTOLIC BLOOD PRESSURE: 78 MMHG | OXYGEN SATURATION: 92 %

## 2023-05-16 DIAGNOSIS — I10 ESSENTIAL HYPERTENSION: Primary | ICD-10-CM

## 2023-05-16 DIAGNOSIS — K21.9 GASTROESOPHAGEAL REFLUX DISEASE, UNSPECIFIED WHETHER ESOPHAGITIS PRESENT: Chronic | ICD-10-CM

## 2023-05-16 DIAGNOSIS — G40.909 SEIZURE DISORDER (HCC): Chronic | ICD-10-CM

## 2023-05-16 DIAGNOSIS — I10 ESSENTIAL HYPERTENSION: ICD-10-CM

## 2023-05-16 DIAGNOSIS — E66.09 CLASS 1 OBESITY DUE TO EXCESS CALORIES WITH SERIOUS COMORBIDITY AND BODY MASS INDEX (BMI) OF 34.0 TO 34.9 IN ADULT: ICD-10-CM

## 2023-05-16 DIAGNOSIS — E89.0 POST-SURGICAL HYPOTHYROIDISM: Chronic | ICD-10-CM

## 2023-05-16 DIAGNOSIS — D70.2 OTHER DRUG-INDUCED NEUTROPENIA (HCC): Chronic | ICD-10-CM

## 2023-05-16 DIAGNOSIS — J01.10 ACUTE NON-RECURRENT FRONTAL SINUSITIS: ICD-10-CM

## 2023-05-16 DIAGNOSIS — I87.2 VENOUS INSUFFICIENCY: Chronic | ICD-10-CM

## 2023-05-16 DIAGNOSIS — E78.00 PURE HYPERCHOLESTEROLEMIA: Chronic | ICD-10-CM

## 2023-05-16 RX ORDER — AMOXICILLIN AND CLAVULANATE POTASSIUM 875; 125 MG/1; MG/1
1 TABLET, FILM COATED ORAL 2 TIMES DAILY
Qty: 20 TABLET | Refills: 0 | Status: SHIPPED | OUTPATIENT
Start: 2023-05-16 | End: 2023-05-26

## 2023-05-16 RX ORDER — MOMETASONE FUROATE 50 UG/1
2 SPRAY, METERED NASAL DAILY
Qty: 3 EACH | Refills: 1 | Status: SHIPPED | OUTPATIENT
Start: 2023-05-16

## 2023-05-16 RX ORDER — TRIAMTERENE AND HYDROCHLOROTHIAZIDE 37.5; 25 MG/1; MG/1
0.5 TABLET ORAL DAILY PRN
Qty: 45 TABLET | Refills: 1 | Status: SHIPPED | OUTPATIENT
Start: 2023-05-16

## 2023-05-16 RX ORDER — AMOXICILLIN AND CLAVULANATE POTASSIUM 875; 125 MG/1; MG/1
1 TABLET, FILM COATED ORAL 2 TIMES DAILY
Qty: 20 TABLET | Refills: 0 | Status: SHIPPED | OUTPATIENT
Start: 2023-05-16 | End: 2023-05-16

## 2023-05-16 SDOH — ECONOMIC STABILITY: FOOD INSECURITY: WITHIN THE PAST 12 MONTHS, YOU WORRIED THAT YOUR FOOD WOULD RUN OUT BEFORE YOU GOT MONEY TO BUY MORE.: NEVER TRUE

## 2023-05-16 SDOH — ECONOMIC STABILITY: INCOME INSECURITY: HOW HARD IS IT FOR YOU TO PAY FOR THE VERY BASICS LIKE FOOD, HOUSING, MEDICAL CARE, AND HEATING?: NOT HARD AT ALL

## 2023-05-16 SDOH — ECONOMIC STABILITY: FOOD INSECURITY: WITHIN THE PAST 12 MONTHS, THE FOOD YOU BOUGHT JUST DIDN'T LAST AND YOU DIDN'T HAVE MONEY TO GET MORE.: NEVER TRUE

## 2023-05-16 ASSESSMENT — PATIENT HEALTH QUESTIONNAIRE - PHQ9
1. LITTLE INTEREST OR PLEASURE IN DOING THINGS: 0
SUM OF ALL RESPONSES TO PHQ QUESTIONS 1-9: 0
SUM OF ALL RESPONSES TO PHQ9 QUESTIONS 1 & 2: 0
SUM OF ALL RESPONSES TO PHQ QUESTIONS 1-9: 0
SUM OF ALL RESPONSES TO PHQ QUESTIONS 1-9: 0
2. FEELING DOWN, DEPRESSED OR HOPELESS: 0
SUM OF ALL RESPONSES TO PHQ QUESTIONS 1-9: 0

## 2023-05-16 NOTE — ASSESSMENT & PLAN NOTE
Improved. Patient was asked about her current diet and exercise habits, and personalized advice was provided regarding recommended lifestyle changes. Patient's comorbid health conditions associated with elevated BMI were discussed, as well as the likely benefits of weight loss. Based upon patient's motivation to change her behavior, the following plan was agreed upon to work toward a weight loss goal of 20 pounds: continue exercise for at least 30 minutes 4-5 days per week and lower carb diet. Educational materials for weight loss were provided. Patient will follow-up in 6 month(s) with PCP. Provider spent 15 minutes counseling patient.

## 2023-05-16 NOTE — ASSESSMENT & PLAN NOTE
Well-controlled on daily PPI, but symptoms quickly recur if tries to wean/hold this medication. Will try again once she gets closer to goal weight. Reflux precautions discussed.

## 2023-05-16 NOTE — ASSESSMENT & PLAN NOTE
Fatigue improved and is likely multifactorial, but will adjust levothyroxine dose if indicated by lab results.

## 2023-05-17 LAB
ALBUMIN SERPL-MCNC: 4.5 G/DL (ref 3.4–5)
ALBUMIN/GLOB SERPL: 1.3 {RATIO} (ref 1.1–2.2)
ALP SERPL-CCNC: 111 U/L (ref 40–129)
ALT SERPL-CCNC: 15 U/L (ref 10–40)
ANION GAP SERPL CALCULATED.3IONS-SCNC: 16 MMOL/L (ref 3–16)
AST SERPL-CCNC: 24 U/L (ref 15–37)
BASOPHILS # BLD: 0 K/UL (ref 0–0.2)
BASOPHILS NFR BLD: 1.2 %
BILIRUB SERPL-MCNC: 0.5 MG/DL (ref 0–1)
BUN SERPL-MCNC: 13 MG/DL (ref 7–20)
CALCIUM SERPL-MCNC: 10 MG/DL (ref 8.3–10.6)
CHLORIDE SERPL-SCNC: 101 MMOL/L (ref 99–110)
CHOLEST SERPL-MCNC: 201 MG/DL (ref 0–199)
CO2 SERPL-SCNC: 26 MMOL/L (ref 21–32)
CREAT SERPL-MCNC: 0.7 MG/DL (ref 0.6–1.2)
DEPRECATED RDW RBC AUTO: 14.8 % (ref 12.4–15.4)
EOSINOPHIL # BLD: 0.1 K/UL (ref 0–0.6)
EOSINOPHIL NFR BLD: 2.2 %
GFR SERPLBLD CREATININE-BSD FMLA CKD-EPI: >60 ML/MIN/{1.73_M2}
GLUCOSE P FAST SERPL-MCNC: 89 MG/DL (ref 70–99)
HCT VFR BLD AUTO: 40.2 % (ref 36–48)
HDLC SERPL-MCNC: 54 MG/DL (ref 40–60)
HGB BLD-MCNC: 13 G/DL (ref 12–16)
LDL CHOLESTEROL CALCULATED: 133 MG/DL
LYMPHOCYTES # BLD: 2.1 K/UL (ref 1–5.1)
LYMPHOCYTES NFR BLD: 52.1 %
MCH RBC QN AUTO: 30.1 PG (ref 26–34)
MCHC RBC AUTO-ENTMCNC: 32.4 G/DL (ref 31–36)
MCV RBC AUTO: 93 FL (ref 80–100)
MONOCYTES # BLD: 0.3 K/UL (ref 0–1.3)
MONOCYTES NFR BLD: 7 %
NEUTROPHILS # BLD: 1.5 K/UL (ref 1.7–7.7)
NEUTROPHILS NFR BLD: 37.5 %
PLATELET # BLD AUTO: 175 K/UL (ref 135–450)
PMV BLD AUTO: 10.1 FL (ref 5–10.5)
POTASSIUM SERPL-SCNC: 4.7 MMOL/L (ref 3.5–5.1)
PROT SERPL-MCNC: 7.9 G/DL (ref 6.4–8.2)
RBC # BLD AUTO: 4.32 M/UL (ref 4–5.2)
SODIUM SERPL-SCNC: 143 MMOL/L (ref 136–145)
TRIGL SERPL-MCNC: 72 MG/DL (ref 0–150)
TSH SERPL DL<=0.005 MIU/L-ACNC: 2.04 UIU/ML (ref 0.27–4.2)
VLDLC SERPL CALC-MCNC: 14 MG/DL
WBC # BLD AUTO: 4.1 K/UL (ref 4–11)

## 2023-05-18 ENCOUNTER — OFFICE VISIT (OUTPATIENT)
Dept: GYNECOLOGY | Age: 78
End: 2023-05-18

## 2023-05-18 VITALS
DIASTOLIC BLOOD PRESSURE: 88 MMHG | SYSTOLIC BLOOD PRESSURE: 148 MMHG | HEIGHT: 67 IN | HEART RATE: 70 BPM | WEIGHT: 218 LBS | RESPIRATION RATE: 18 BRPM | OXYGEN SATURATION: 98 % | BODY MASS INDEX: 34.21 KG/M2

## 2023-05-18 DIAGNOSIS — Z12.31 ENCOUNTER FOR SCREENING MAMMOGRAM FOR MALIGNANT NEOPLASM OF BREAST: ICD-10-CM

## 2023-05-18 DIAGNOSIS — Z01.419 WELL WOMAN EXAM WITH ROUTINE GYNECOLOGICAL EXAM: Primary | ICD-10-CM

## 2023-05-18 DIAGNOSIS — Z78.0 MENOPAUSE: ICD-10-CM

## 2023-05-21 ASSESSMENT — ENCOUNTER SYMPTOMS
EYES NEGATIVE: 1
ALLERGIC/IMMUNOLOGIC NEGATIVE: 1
GASTROINTESTINAL NEGATIVE: 1
RESPIRATORY NEGATIVE: 1

## 2023-07-12 RX ORDER — PANTOPRAZOLE SODIUM 40 MG/1
TABLET, DELAYED RELEASE ORAL
Qty: 90 TABLET | Refills: 1 | Status: SHIPPED | OUTPATIENT
Start: 2023-07-12

## 2023-07-12 RX ORDER — LEVOTHYROXINE SODIUM 88 MCG
TABLET ORAL
Qty: 90 TABLET | Refills: 1 | Status: SHIPPED | OUTPATIENT
Start: 2023-07-12

## 2023-08-08 ENCOUNTER — TELEMEDICINE (OUTPATIENT)
Dept: INTERNAL MEDICINE CLINIC | Age: 78
End: 2023-08-08

## 2023-08-08 DIAGNOSIS — J20.9 ACUTE BRONCHITIS WITH WHEEZING: ICD-10-CM

## 2023-08-08 DIAGNOSIS — J01.90 ACUTE NON-RECURRENT SINUSITIS, UNSPECIFIED LOCATION: Primary | ICD-10-CM

## 2023-08-08 RX ORDER — ALBUTEROL SULFATE 90 UG/1
2 AEROSOL, METERED RESPIRATORY (INHALATION) 4 TIMES DAILY
Qty: 18 G | Refills: 2 | Status: SHIPPED | OUTPATIENT
Start: 2023-08-08

## 2023-08-08 RX ORDER — DOXYCYCLINE HYCLATE 100 MG/1
100 CAPSULE ORAL 2 TIMES DAILY
Qty: 20 CAPSULE | Refills: 0 | Status: SHIPPED | OUTPATIENT
Start: 2023-08-08 | End: 2023-08-18

## 2023-08-10 ENCOUNTER — TELEPHONE (OUTPATIENT)
Dept: GYNECOLOGY | Age: 78
End: 2023-08-10

## 2023-08-10 NOTE — TELEPHONE ENCOUNTER
Called patient at [phone] her phone just rung.  Called patient to get name of place she wants fax sent to of her order

## 2023-08-10 NOTE — TELEPHONE ENCOUNTER
Patient is trying to schedule bone density test but was told that she needs her paper worked faxed over to [facility] office.       Fax number [phone]

## 2023-09-06 ENCOUNTER — TELEPHONE (OUTPATIENT)
Dept: INTERNAL MEDICINE CLINIC | Age: 78
End: 2023-09-06

## 2023-09-06 DIAGNOSIS — I83.812 VARICOSE VEINS OF LEFT LOWER EXTREMITY WITH PAIN: Primary | ICD-10-CM

## 2023-09-06 NOTE — TELEPHONE ENCOUNTER
Patient states she was at the surgeon and her  mentioned she has a vein in the back of her leg that is occasionally painful. Patient states she has had it for years & it has been checked out before but surgeon wants her to get it checked out again, before surgery.  Patient requesting a referral to someone that Dr. Kirstie Hanson recommends

## 2023-09-06 NOTE — TELEPHONE ENCOUNTER
----- Message from Nina Vaughan sent at 9/6/2023 10:53 AM EDT -----  Subject: Message to Provider    QUESTIONS  Information for Provider? Melissa Alvarez called on 09/06 @ 10:50 am. She needs a Pre op for total left knee replacement for 11/01 at [facility] with Dr. Magui Santoyo. Before she makes the appointment she wants   to know if she referred to a vein doctor. She has a concern about the vein the back of her left leg. Please call back to discuss further with the patient.  ---------------------------------------------------------------------------  --------------  Merlyn GASCA  [phone]; OK to leave message on voicemail  ---------------------------------------------------------------------------  --------------  SCRIPT ANSWERS  Relationship to Patient? Self  Do you have questions for your provider that need to be answered prior to   scheduling your pre-op appointment?  Yes

## 2023-10-04 RX ORDER — LOSARTAN POTASSIUM 50 MG/1
TABLET ORAL
Qty: 90 TABLET | Refills: 1 | Status: SHIPPED | OUTPATIENT
Start: 2023-10-04

## 2023-10-06 ENCOUNTER — TELEPHONE (OUTPATIENT)
Dept: INTERNAL MEDICINE CLINIC | Age: 78
End: 2023-10-06

## 2023-10-06 NOTE — TELEPHONE ENCOUNTER
----- Message from Daniel Adair sent at 10/6/2023  9:15 AM EDT -----  Subject: Appointment Request    Reason for Call: Established Patient Appointment needed: Routine Pre-Op    QUESTIONS    Reason for appointment request? Available appointments did not meet   patient need     Additional Information for Provider? pre-op:pt will be having knee   replacement surgery on 11/1/2023 at [facility] with Dr. Bairon Fernandez   ---------------------------------------------------------------------------  --------------  Kiersten Marine Adriano  [phone]; OK to leave message on voicemail  ---------------------------------------------------------------------------  --------------  SCRIPT ANSWERS

## 2023-10-11 ENCOUNTER — OFFICE VISIT (OUTPATIENT)
Dept: VASCULAR SURGERY | Age: 78
End: 2023-10-11
Payer: MEDICARE

## 2023-10-11 VITALS — HEART RATE: 70 BPM | WEIGHT: 218 LBS | BODY MASS INDEX: 34.21 KG/M2 | HEIGHT: 67 IN

## 2023-10-11 DIAGNOSIS — I78.1 SPIDER VEINS OF LIMB: Primary | ICD-10-CM

## 2023-10-11 PROCEDURE — 1090F PRES/ABSN URINE INCON ASSESS: CPT | Performed by: SURGERY

## 2023-10-11 PROCEDURE — G8399 PT W/DXA RESULTS DOCUMENT: HCPCS | Performed by: SURGERY

## 2023-10-11 PROCEDURE — G8417 CALC BMI ABV UP PARAM F/U: HCPCS | Performed by: SURGERY

## 2023-10-11 PROCEDURE — 1123F ACP DISCUSS/DSCN MKR DOCD: CPT | Performed by: SURGERY

## 2023-10-11 PROCEDURE — G8427 DOCREV CUR MEDS BY ELIG CLIN: HCPCS | Performed by: SURGERY

## 2023-10-11 PROCEDURE — 99203 OFFICE O/P NEW LOW 30 MIN: CPT | Performed by: SURGERY

## 2023-10-11 PROCEDURE — 1036F TOBACCO NON-USER: CPT | Performed by: SURGERY

## 2023-10-11 PROCEDURE — G8484 FLU IMMUNIZE NO ADMIN: HCPCS | Performed by: SURGERY

## 2023-10-11 ASSESSMENT — ENCOUNTER SYMPTOMS
EYES NEGATIVE: 1
ALLERGIC/IMMUNOLOGIC NEGATIVE: 1
RESPIRATORY NEGATIVE: 1
GASTROINTESTINAL NEGATIVE: 1

## 2023-10-11 NOTE — PROGRESS NOTES
Subjective:      Patient ID: Inocencia Lyles is a 78 y.o. female. HPI Referral from Kirsty Vale MD and Sabrina Michelle MD for vascular evaluation prior to planned left knee replacement. Patient denies any claudication but has difficulty walking significant distances due to her left knee pain. Remarked to her with the surgeon and primary care physician that she had some lateral left thigh discomfort in the area of some large varicosities and was asked to see a vascular surgeon for evaluation. Outside of the knee pain denies any significant left leg discomfort. No previous venous interventions. Does not wear compression stockings. No history of SVT, DVT, bleeding, ulceration or significant edema.     Past Medical History:   Diagnosis Date    Allergic rhinitis     Bell's palsy 01/2005    Left    Cervical spondylosis     C5-7    Closed fracture of fifth metatarsal bone 03/2011    Left- displaced    Colorectal polyps     Essential hypertension     GERD (gastroesophageal reflux disease)      EGD 8/08- mild gastritis    Goiter     Multinodular- s/p thyroidectomy 7/04    Hyperlipidemia     Irregular uterine bleeding     Meningiomas, multiple (HCC)     Menorrhagia     Migraine headache     Osteoarthritis     Post-surgical hypothyroidism     Seizure disorder ([address])     Secondary to meningioma    Spinal stenosis, lumbar     L3-4, L4-5: moderately severe    Tricuspid regurgitation     Trigger finger     Right    Vaginal prolapse     Venous insufficiency      Past Surgical History:   Procedure Laterality Date    ARTHROPLASTY Left 9/05    4th toe    BLEPHAROPLASTY Bilateral 5/06    lower eyelid    BRAIN MENINGIOMA EXCISION  1992, 2000    CARPAL TUNNEL RELEASE Bilateral 1991, 1993, 2003    CHOLECYSTECTOMY  2002    EYE SURGERY Left 11/10/2020    REVISION LEFT ENDOSCOPIC DACRYOCYSTORHINOSTOMY/ DACRYOCYSTORHINOSTOMY WITH STENT PLACEMENT, LEFT SIDE     FINGER TRIGGER RELEASE Right 4/07    FINGER TRIGGER RELEASE Right

## 2023-10-12 NOTE — ASSESSMENT & PLAN NOTE
At risk for ROBERTH due to BMI, but no snoring or excessive fatigue. Recommend routine nanci-operative monitoring of O2 sats.

## 2023-10-12 NOTE — ASSESSMENT & PLAN NOTE
Well-controlled. Continue current medications, but will losartan and diuretic on morning of surgery.

## 2023-10-12 NOTE — PROGRESS NOTES
Preoperative Consultation      Owens Peabody  YOB: 1945    Date of Service:  10/24/2023    Chief Complaint   Patient presents with    Pre-op Exam       HPI:    Owens Peabody (: 1945) is a 78 y.o. female, established patient, here for a preoperative consultation at the request of surgeon, Dr. Denisse Bauer, who plans on performing left TKR on  at [facility]. Planned anesthesia: General   Known anesthesia problems: None   Bleeding risk: No recent or remote history of abnormal bleeding  Personal or FH of DVT/PE: Yes - mother, MGF   Recent steroid use: no  Exercise tolerance before surgery at least 4 METS (Can walk up a flight of steps or a hill or walk on level ground at 3 to 4 mph): Yes   Patient objection to receiving blood products: No    Patient Active Problem List   Diagnosis    Hyperlipidemia    Colorectal polyps    Migraine headache    Seizure disorder (HCC)    Venous insufficiency    Post-surgical hypothyroidism    Allergic rhinitis    Leukopenia    Cervical spondylosis    Spinal stenosis, lumbar    Shoulder pain, left    GERD (gastroesophageal reflux disease)    Paresthesias    Trigger ring finger    Hammer toe of right foot    Cataracts, bilateral    Obesity due to excess calories with serious comorbidity    Essential hypertension    CMC arthritis    Hyperpigmentation    Nonrheumatic tricuspid valve regurgitation    Arthritis of right hip    At high risk for falls    Rotator cuff tendinitis, left       Review of Systems   Constitutional: Negative. HENT: Negative. Eyes: Negative. Respiratory: Negative. Cardiovascular: Negative. Gastrointestinal: Negative. Genitourinary: Negative. Musculoskeletal:  Positive for arthralgias (left knee pain). Skin: Negative. Neurological: Negative. Psychiatric/Behavioral: Negative.           Allergies   Allergen Reactions    Latex Anaphylaxis    Ondansetron     Sulfa Antibiotics      \"GAVE ME

## 2023-10-24 ENCOUNTER — OFFICE VISIT (OUTPATIENT)
Dept: INTERNAL MEDICINE CLINIC | Age: 78
End: 2023-10-24

## 2023-10-24 VITALS
HEART RATE: 56 BPM | WEIGHT: 213.4 LBS | BODY MASS INDEX: 33.42 KG/M2 | DIASTOLIC BLOOD PRESSURE: 80 MMHG | SYSTOLIC BLOOD PRESSURE: 132 MMHG | OXYGEN SATURATION: 95 %

## 2023-10-24 DIAGNOSIS — G40.909 SEIZURE DISORDER (HCC): Chronic | ICD-10-CM

## 2023-10-24 DIAGNOSIS — M17.12 PRIMARY OSTEOARTHRITIS OF LEFT KNEE: Primary | ICD-10-CM

## 2023-10-24 DIAGNOSIS — Z01.818 PREOP EXAMINATION: ICD-10-CM

## 2023-10-24 DIAGNOSIS — I10 ESSENTIAL HYPERTENSION: ICD-10-CM

## 2023-10-24 DIAGNOSIS — E66.09 CLASS 1 OBESITY DUE TO EXCESS CALORIES WITH SERIOUS COMORBIDITY AND BODY MASS INDEX (BMI) OF 34.0 TO 34.9 IN ADULT: ICD-10-CM

## 2023-10-24 ASSESSMENT — ENCOUNTER SYMPTOMS
GASTROINTESTINAL NEGATIVE: 1
EYES NEGATIVE: 1
RESPIRATORY NEGATIVE: 1

## 2023-11-02 RX ORDER — TRIAMTERENE AND HYDROCHLOROTHIAZIDE 37.5; 25 MG/1; MG/1
0.5 TABLET ORAL DAILY PRN
Qty: 45 TABLET | Refills: 1 | Status: SHIPPED | OUTPATIENT
Start: 2023-11-02

## 2023-11-19 PROBLEM — Z96.653 STATUS POST TOTAL BILATERAL KNEE REPLACEMENT: Status: ACTIVE | Noted: 2023-11-19

## 2023-11-19 PROBLEM — M17.12 OSTEOARTHRITIS OF LEFT KNEE: Status: RESOLVED | Noted: 2023-10-24 | Resolved: 2023-11-19

## 2023-11-21 ENCOUNTER — TELEPHONE (OUTPATIENT)
Dept: INTERNAL MEDICINE CLINIC | Age: 78
End: 2023-11-21

## 2023-11-21 NOTE — TELEPHONE ENCOUNTER
Patient LVM on after hours line on 11/21/23 @ 6:30am requesting to cancel appt scheduled for today at 10:30am.  Patient stated she would call back to reschedule.

## 2023-11-27 ENCOUNTER — TELEMEDICINE (OUTPATIENT)
Dept: PRIMARY CARE CLINIC | Age: 78
End: 2023-11-27
Payer: MEDICARE

## 2023-11-27 DIAGNOSIS — K21.00 GASTROESOPHAGEAL REFLUX DISEASE WITH ESOPHAGITIS WITHOUT HEMORRHAGE: Primary | Chronic | ICD-10-CM

## 2023-11-27 PROCEDURE — G8428 CUR MEDS NOT DOCUMENT: HCPCS | Performed by: NURSE PRACTITIONER

## 2023-11-27 PROCEDURE — 1036F TOBACCO NON-USER: CPT | Performed by: NURSE PRACTITIONER

## 2023-11-27 PROCEDURE — G8484 FLU IMMUNIZE NO ADMIN: HCPCS | Performed by: NURSE PRACTITIONER

## 2023-11-27 PROCEDURE — 1090F PRES/ABSN URINE INCON ASSESS: CPT | Performed by: NURSE PRACTITIONER

## 2023-11-27 PROCEDURE — 99213 OFFICE O/P EST LOW 20 MIN: CPT | Performed by: NURSE PRACTITIONER

## 2023-11-27 PROCEDURE — 1123F ACP DISCUSS/DSCN MKR DOCD: CPT | Performed by: NURSE PRACTITIONER

## 2023-11-27 PROCEDURE — G8399 PT W/DXA RESULTS DOCUMENT: HCPCS | Performed by: NURSE PRACTITIONER

## 2023-11-27 PROCEDURE — G8417 CALC BMI ABV UP PARAM F/U: HCPCS | Performed by: NURSE PRACTITIONER

## 2023-11-27 RX ORDER — DEXLANSOPRAZOLE 30 MG/1
30 CAPSULE, DELAYED RELEASE ORAL DAILY
Qty: 30 CAPSULE | Refills: 0 | Status: SHIPPED | OUTPATIENT
Start: 2023-11-27

## 2023-11-27 ASSESSMENT — ENCOUNTER SYMPTOMS
BELCHING: 1
HOARSE VOICE: 0
HEARTBURN: 1
ABDOMINAL PAIN: 1
SORE THROAT: 0
GLOBUS SENSATION: 0
COUGH: 0
CHOKING: 0
WHEEZING: 0
WATER BRASH: 1
NAUSEA: 1
STRIDOR: 0

## 2023-11-27 NOTE — PROGRESS NOTES
problem. The current episode started 1 to 4 weeks ago. The problem occurs constantly. The problem has been gradually worsening. The heartburn duration is more than one hour. The heartburn is located in the substernum and abdomen. The heartburn is of severe intensity. The heartburn wakes her from sleep. The heartburn limits her activity. The heartburn changes with position. The symptoms are aggravated by lying down and medications. Associated symptoms include weight loss (5lbs). There are no known risk factors. She has tried an antacid, a diet change, head elevation, an herbal remedy, a PPI and a histamine-2 antagonist for the symptoms. The treatment provided no relief. Past procedures include an EGD. Review of Systems   Constitutional:  Positive for weight loss (5lbs). HENT:  Negative for hoarse voice and sore throat. Respiratory:  Negative for cough, choking and wheezing. Cardiovascular:  Negative for chest pain. Gastrointestinal:  Positive for abdominal pain, heartburn and nausea. Negative for dysphagia.        Objective:  Patient-Reported Vitals  No data recorded          No data to display                 Physical Exam:  [INSTRUCTIONS:  \"[x]\" Indicates a positive item  \"[]\" Indicates a negative item  -- DELETE ALL ITEMS NOT EXAMINED]    Constitutional: [x] Appears well-developed and well-nourished [x] No apparent distress      [] Abnormal -     Mental status: [x] Alert and awake  [x] Oriented to person/place/time [x] Able to follow commands    [] Abnormal -     Eyes:   EOM    [x]  Normal    [] Abnormal -   Sclera  [x]  Normal    [] Abnormal -          Discharge [x]  None visible   [] Abnormal -     HENT: [x] Normocephalic, atraumatic  [] Abnormal -   [x] Mouth/Throat: Mucous membranes are moist    External Ears [x] Normal  [] Abnormal -    Neck: [x] No visualized mass [] Abnormal -     Pulmonary/Chest: [x] Respiratory effort normal   [x] No visualized signs of difficulty breathing or respiratory

## 2023-12-05 ENCOUNTER — TELEPHONE (OUTPATIENT)
Dept: INTERNAL MEDICINE CLINIC | Age: 78
End: 2023-12-05

## 2023-12-05 DIAGNOSIS — K21.9 GASTROESOPHAGEAL REFLUX DISEASE, UNSPECIFIED WHETHER ESOPHAGITIS PRESENT: Primary | Chronic | ICD-10-CM

## 2023-12-05 NOTE — TELEPHONE ENCOUNTER
Patient's  called in stating that the patient's Elsie Goff is an issue right now. She has been belching a lot, it keeps her up at night, and it's very frequent. When I was talking to the  you can hear patient in the background. Every other word, there was a burp. He says this has been going on for a month now. Patient was put on dexlansoprazole, but it's not helping.    Please advise

## 2023-12-05 NOTE — TELEPHONE ENCOUNTER
Carlin Her spoke with patient and she advised on gaviscon. Patient doesn't have an gastroenterologist so, Carlin Her gave them Dr. Mabel Blank MD (from care team tab in [facility]) but patient hasn't been seen there since 2013 I believe. Can a referral be placed?

## 2023-12-12 NOTE — PROGRESS NOTES
ENDOSCOPY PREOP INSTRUCTIONS      Please at arrival time given to you from your doctor's office. Report to the MAIN entrance on [address] and register at the surgery center on the left-hand side of the lobby  You will need your insurance card and photo id and a list of all medications taken on a regular basis. Please include the dose/frequency. For your procedure:     PLEASE FOLLOW ALL INSTRUCTIONS & PREPS GIVEN TO YOU BY YOUR DOCTOR'S OFFICE. If you have not received these instructions yet, please call the office immediately. Make sure to read them as soon as received. If you are taking blood thinners, Aspirin or diabetic medication, make sure to call your doctor as soon as possible for instructions prior to your procedure. Please dress comfortably and do not wear any lotion, powders or jewelry  If you use oxygen at home, please bring your oxygen tank with you to hospital.  Arrange for someone to be with you and sign you out & drive you home after your procedure. THIS PERSON MUST WAIT AT HOSPITAL THE ENTIRE TIME. We allow 2 adult visitors with you in the hospital & masks are strongly recommended.     WOMEN ONLY OF CHILDBEARING AGE: Please make sure to be able to give a urine sample on arrival      If you have further questions, you may contact your Endoscopist's office or Pre Admission Testing staff at [phone]

## 2023-12-13 ENCOUNTER — ANESTHESIA EVENT (OUTPATIENT)
Dept: ENDOSCOPY | Age: 78
End: 2023-12-13
Payer: MEDICARE

## 2023-12-13 ENCOUNTER — HOSPITAL ENCOUNTER (OUTPATIENT)
Age: 78
Setting detail: OUTPATIENT SURGERY
Discharge: HOME | End: 2023-12-13
Attending: INTERNAL MEDICINE | Admitting: INTERNAL MEDICINE
Payer: MEDICARE

## 2023-12-13 ENCOUNTER — ANESTHESIA (OUTPATIENT)
Dept: ENDOSCOPY | Age: 78
End: 2023-12-13
Payer: MEDICARE

## 2023-12-13 VITALS
HEIGHT: 67 IN | OXYGEN SATURATION: 95 % | HEART RATE: 78 BPM | TEMPERATURE: 97.6 F | WEIGHT: 205 LBS | RESPIRATION RATE: 16 BRPM | BODY MASS INDEX: 32.18 KG/M2 | DIASTOLIC BLOOD PRESSURE: 69 MMHG | SYSTOLIC BLOOD PRESSURE: 114 MMHG

## 2023-12-13 DIAGNOSIS — R14.2 BELCHING: ICD-10-CM

## 2023-12-13 DIAGNOSIS — K21.9 CHRONIC GERD: ICD-10-CM

## 2023-12-13 PROCEDURE — 88305 TISSUE EXAM BY PATHOLOGIST: CPT

## 2023-12-13 PROCEDURE — 2580000003 HC RX 258: Performed by: ANESTHESIOLOGY

## 2023-12-13 PROCEDURE — 2580000003 HC RX 258: Performed by: NURSE ANESTHETIST, CERTIFIED REGISTERED

## 2023-12-13 PROCEDURE — 3700000000 HC ANESTHESIA ATTENDED CARE: Performed by: INTERNAL MEDICINE

## 2023-12-13 PROCEDURE — 6360000002 HC RX W HCPCS: Performed by: NURSE ANESTHETIST, CERTIFIED REGISTERED

## 2023-12-13 PROCEDURE — 2709999900 HC NON-CHARGEABLE SUPPLY: Performed by: INTERNAL MEDICINE

## 2023-12-13 PROCEDURE — 3609012400 HC EGD TRANSORAL BIOPSY SINGLE/MULTIPLE: Performed by: INTERNAL MEDICINE

## 2023-12-13 PROCEDURE — 7100000011 HC PHASE II RECOVERY - ADDTL 15 MIN: Performed by: INTERNAL MEDICINE

## 2023-12-13 PROCEDURE — 7100000010 HC PHASE II RECOVERY - FIRST 15 MIN: Performed by: INTERNAL MEDICINE

## 2023-12-13 PROCEDURE — 3700000001 HC ADD 15 MINUTES (ANESTHESIA): Performed by: INTERNAL MEDICINE

## 2023-12-13 PROCEDURE — 2500000003 HC RX 250 WO HCPCS: Performed by: NURSE ANESTHETIST, CERTIFIED REGISTERED

## 2023-12-13 RX ORDER — GLYCOPYRROLATE 0.2 MG/ML
INJECTION INTRAMUSCULAR; INTRAVENOUS PRN
Status: DISCONTINUED | OUTPATIENT
Start: 2023-12-13 | End: 2023-12-13 | Stop reason: SDUPTHER

## 2023-12-13 RX ORDER — SODIUM CHLORIDE, SODIUM LACTATE, POTASSIUM CHLORIDE, CALCIUM CHLORIDE 600; 310; 30; 20 MG/100ML; MG/100ML; MG/100ML; MG/100ML
INJECTION, SOLUTION INTRAVENOUS CONTINUOUS
Status: DISCONTINUED | OUTPATIENT
Start: 2023-12-13 | End: 2023-12-13 | Stop reason: HOSPADM

## 2023-12-13 RX ORDER — SODIUM CHLORIDE, SODIUM LACTATE, POTASSIUM CHLORIDE, CALCIUM CHLORIDE 600; 310; 30; 20 MG/100ML; MG/100ML; MG/100ML; MG/100ML
INJECTION, SOLUTION INTRAVENOUS CONTINUOUS PRN
Status: DISCONTINUED | OUTPATIENT
Start: 2023-12-13 | End: 2023-12-13 | Stop reason: SDUPTHER

## 2023-12-13 RX ORDER — PROPOFOL 10 MG/ML
INJECTION, EMULSION INTRAVENOUS PRN
Status: DISCONTINUED | OUTPATIENT
Start: 2023-12-13 | End: 2023-12-13 | Stop reason: SDUPTHER

## 2023-12-13 RX ORDER — LIDOCAINE HYDROCHLORIDE 20 MG/ML
INJECTION, SOLUTION INFILTRATION; PERINEURAL PRN
Status: DISCONTINUED | OUTPATIENT
Start: 2023-12-13 | End: 2023-12-13 | Stop reason: SDUPTHER

## 2023-12-13 RX ADMIN — PROPOFOL 30 MG: 10 INJECTION, EMULSION INTRAVENOUS at 09:25

## 2023-12-13 RX ADMIN — LIDOCAINE HYDROCHLORIDE 80 MG: 20 INJECTION, SOLUTION INFILTRATION; PERINEURAL at 09:21

## 2023-12-13 RX ADMIN — PROPOFOL 20 MG: 10 INJECTION, EMULSION INTRAVENOUS at 09:28

## 2023-12-13 RX ADMIN — SODIUM CHLORIDE, SODIUM LACTATE, POTASSIUM CHLORIDE, AND CALCIUM CHLORIDE: .6; .31; .03; .02 INJECTION, SOLUTION INTRAVENOUS at 09:12

## 2023-12-13 RX ADMIN — GLYCOPYRROLATE 0.2 MG: 0.2 INJECTION INTRAMUSCULAR; INTRAVENOUS at 09:26

## 2023-12-13 RX ADMIN — PROPOFOL 20 MG: 10 INJECTION, EMULSION INTRAVENOUS at 09:23

## 2023-12-13 RX ADMIN — PROPOFOL 80 MG: 10 INJECTION, EMULSION INTRAVENOUS at 09:21

## 2023-12-13 RX ADMIN — SODIUM CHLORIDE, POTASSIUM CHLORIDE, SODIUM LACTATE AND CALCIUM CHLORIDE: 600; 310; 30; 20 INJECTION, SOLUTION INTRAVENOUS at 08:21

## 2023-12-13 ASSESSMENT — PAIN - FUNCTIONAL ASSESSMENT
PAIN_FUNCTIONAL_ASSESSMENT: 0-10

## 2023-12-13 NOTE — ANESTHESIA POSTPROCEDURE EVALUATION
Department of Anesthesiology  Postprocedure Note    Patient: Carmen Robb  MRN: [id number]  YOB: 1945  Date of evaluation: 12/13/2023    Procedure Summary       Date: 12/13/23 Room / Location: [facility]  / [facility]    Anesthesia Start: 0915 Anesthesia Stop: 0360    Procedure: ESOPHAGOGASTRODUODENOSCOPY Diagnosis:       Chronic GERD      Belching      (Chronic GERD [K21.9])      (Belching [R14.2])    Surgeons: Vicenta Dong MD Responsible Provider: Wayne Pfeiffer MD    Anesthesia Type: MAC ASA Status: 3            Anesthesia Type: No value filed. Ryan Phase I: Ryan Score: 10    Ryan Phase II:      Anesthesia Post Evaluation    Patient location during evaluation: PACU  Patient participation: complete - patient participated  Level of consciousness: awake  Pain score: 0  Airway patency: patent  Nausea & Vomiting: no nausea  Cardiovascular status: hemodynamically stable  Respiratory status: acceptable  Hydration status: stable  Pain management: satisfactory to patient    No notable events documented.

## 2023-12-13 NOTE — DISCHARGE INSTRUCTIONS
breath test or stomach biopsy. Follow your doctor's instructions about how long you need to avoid eating and drinking before the test. If you are going to have a stomach biopsy, your doctor will give you instructions on how to prepare. How is the test done? Blood antibody test  A health professional uses a needle to take a blood sample, usually from the arm. Urea breath test  A breath sample is collected when you blow into a balloon or blow bubbles into a bottle of liquid. The health professional will:  Collect a sample of your breath before the test starts. Give you a capsule or some water to swallow that contains tagged or radioactive material.  Collect more samples of your breath. The samples will be tested to see if they contain material formed when H. pylori comes into contact with the tagged or radioactive material.  Stool antigen test  For this test, you may be asked to collect the stool sample at home. To collect the sample, you need to:  Pass stool into a dry container. Either solid or liquid stools can be collected. Be careful not to get urine or toilet tissue in with the stool sample. Replace the container cap. Label the container with your name, your doctor's name, and the date the sample was collected. Wash your hands well after you collect the sample. Take the sealed container to your doctor's office or to the lab as soon as you can. Stomach biopsy  A procedure called endoscopy is used to collect samples of tissue from the stomach and the first part of the small intestine. The tissue samples are tested in a lab to see if they contain H. pylori. Follow-up care is a key part of your treatment and safety. Be sure to make and go to all appointments, and call your doctor if you are having problems. It's also a good idea to keep a list of the medicines you take. Ask your doctor when you can expect to have your test results.

## 2023-12-13 NOTE — PROCEDURES
Endoscopy Note    Patient: Suni Myers  : 1945  CSN: [id number]    Procedure: Esophagogastroduodenoscopy with biopsy    Date:  2023     Surgeon:  Kristina Brenner MD     Referring Physician:  Norman Hernandez MD    Preoperative Diagnosis:  Chronic GERD [K21.9]  Belching [R14.2]    Postoperative Diagnosis:  * No post-op diagnosis entered *    Anesthesia:  MAC    EBL: minimal to none. Indications: This is a 78y.o. year old female who presents today with New-onset dyspepsia. Description of Procedure:  Informed consent was obtained from the patient after explanation of indications, benefits and possible risks and complications of the procedure. The patient was then taken to the endoscopy suite, placed in the left lateral decubitus position and the above IV sedation was administrered. The Olympus video endoscope was passed through the hypopharynx into the esophagus. The scope was advanced all the way to the second portion of the duodenum. The GE junction was at approximately 40 cms. The scope was slowly withdrawn and mucosa was carefully evaluated including a retroflex view of the proximal stomach. Findings and interventions are described below. The patient was decompressed and the scope was then withdrawn. Gastric or Duodenal ulcer present: No    The patient tolerated the procedure well and was taken to the post anesthesia care unit in good condition. There were no immediate complications. Impression:    No evidence of erosive esophagitis. 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 Biopsied for histology. Mild erosive antral gastritis-antral biopsies for H. pylori obtained. Rest of the gastric mucosal examination was unremarkable. Normal D1 and D2.      Recommendations:   No endoscopic evidence of reflux esophagitis identified. Follow-up biopsies in the next 7 to 10 days. Follow-up with Dr. Hill Culver in 3 to 4 weeks.     Kristina Brenner MD, MD  [address]    Please

## 2023-12-13 NOTE — PROGRESS NOTES
Ambulatory Surgery/Procedure Discharge Note    Vitals:    12/13/23 0951   BP: 114/69   Pulse: 78   Resp: 16   Temp:    SpO2: 95%       In: 400 [I.V.:400]  Out: -     Restroom use offered before discharge. Yes, pt void per bathroom, assist x1. Pain assessment:  level of pain (1-10, 10 severe)  Pain Level: 0  Pt to Endoscopy recovery post EGD. Pt denies pain at this time. Pt denies nausea at this time, pt tolerating PO fluids well. Pt c/o throat irritation, pt encouraged to use lozenges, warm salt water gargles, warm or cool beverages to soothe irritation. Discharge instructions given to pt and pt's  and both state understanding of these instructions. Pt and pt's  state that pt is \"ready to go. \"       Patient discharged to home/self care.  Patient discharged via wheel chair by transporter to waiting family/S.O.       12/13/2023 2:24 PM

## 2023-12-13 NOTE — ANESTHESIA PRE PROCEDURE
Neuro/Psych:               GI/Hepatic/Renal:   (+) GERD:         ROS comment: Burping. Endo/Other:    (+) hypothyroidism::..                 Abdominal:             Vascular: negative vascular ROS. Other Findings:             Anesthesia Plan      MAC     ASA 3       Induction: intravenous. Anesthetic plan and risks discussed with patient. Plan discussed with CRNA.     Attending anesthesiologist reviewed and agrees with Karie Gonzalez MD   12/13/2023

## 2023-12-13 NOTE — H&P
Gastroenterology Note             Pre-operative History and Physical    Patient: Moon Rai  : 1945  CSN: [id number]    History Obtained From:  patient and/or guardian. HISTORY OF PRESENT ILLNESS:    The patient is a 78 y.o. female  here for persistent heartburn, excessive burping and belching in spite of PPI therapy following her recent knee surgery. Patient does not use any NSAIDs. .      Past Medical History:    Past Medical History:   Diagnosis Date    Allergic rhinitis     Bell's palsy 2005    Left    Cervical spondylosis     C5-7    Closed fracture of fifth metatarsal bone 2011    Left- displaced    Colorectal polyps     Essential hypertension     GERD (gastroesophageal reflux disease)      EGD - mild gastritis    Goiter     Multinodular- s/p thyroidectomy     Hyperlipidemia     Irregular uterine bleeding     Meningiomas, multiple (HCC)     Menorrhagia     Migraine headache     Osteoarthritis     Post-surgical hypothyroidism     Seizure disorder ([address])     Secondary to meningioma    Spinal stenosis, lumbar     L3-4, L4-5: moderately severe    Tricuspid regurgitation     Trigger finger     Right    Vaginal prolapse     Venous insufficiency      Past Surgical History:    Past Surgical History:   Procedure Laterality Date    ARTHROPLASTY Left 2005    4th toe    BLEPHAROPLASTY Bilateral 2006    lower eyelid    BRAIN MENINGIOMA EXCISION  ,     CARPAL TUNNEL RELEASE Bilateral , ,     CHOLECYSTECTOMY  2002    EYE SURGERY Left 11/10/2020    REVISION LEFT ENDOSCOPIC DACRYOCYSTORHINOSTOMY/ DACRYOCYSTORHINOSTOMY WITH STENT PLACEMENT, LEFT SIDE     FINGER TRIGGER RELEASE Right 2007    FINGER TRIGGER RELEASE Right 2013    FOOT SURGERY      with screws placed     FOOT SURGERY Right 2018    OSTEOTOMY 1ST METATARSAL WITH BUNIONECTOMY    HAMMER TOE SURGERY Right 2013    HYSTERECTOMY (CERVIX STATUS UNKNOWN)      still with ovaries

## 2023-12-15 ENCOUNTER — OFFICE VISIT (OUTPATIENT)
Dept: INTERNAL MEDICINE CLINIC | Age: 78
End: 2023-12-15

## 2023-12-15 VITALS
DIASTOLIC BLOOD PRESSURE: 80 MMHG | OXYGEN SATURATION: 99 % | SYSTOLIC BLOOD PRESSURE: 130 MMHG | WEIGHT: 209 LBS | HEART RATE: 74 BPM | BODY MASS INDEX: 33.23 KG/M2

## 2023-12-15 DIAGNOSIS — E78.00 PURE HYPERCHOLESTEROLEMIA: Chronic | ICD-10-CM

## 2023-12-15 DIAGNOSIS — I10 ESSENTIAL HYPERTENSION: Primary | ICD-10-CM

## 2023-12-15 DIAGNOSIS — I87.2 VENOUS INSUFFICIENCY: Chronic | ICD-10-CM

## 2023-12-15 DIAGNOSIS — K21.9 GASTROESOPHAGEAL REFLUX DISEASE, UNSPECIFIED WHETHER ESOPHAGITIS PRESENT: Chronic | ICD-10-CM

## 2023-12-15 DIAGNOSIS — E89.0 POST-SURGICAL HYPOTHYROIDISM: Chronic | ICD-10-CM

## 2023-12-15 NOTE — ASSESSMENT & PLAN NOTE
Adequately controlled on low sodium diet. She will resume compression stockings and prn diuretic if symptoms worsen.

## 2023-12-15 NOTE — ASSESSMENT & PLAN NOTE
Discussed importance of continued lifestyle changes to help prevent need for cholesterol medication in the future.

## 2023-12-15 NOTE — ASSESSMENT & PLAN NOTE
Suspect recent exacerbation due to higher dose of daily ASA post-op, which she has been taking on an empty stomach. Improved with switch from pantoprazole to Dexilant and addition of Baclofen for esophageal spasm. She was advised to take her 81 mg ASA after eating. She will follow up with GI as directed.

## 2023-12-29 ENCOUNTER — TELEPHONE (OUTPATIENT)
Dept: INTERNAL MEDICINE CLINIC | Age: 78
End: 2023-12-29

## 2023-12-29 NOTE — TELEPHONE ENCOUNTER
Please call patient to convey content of unviewed Result Note containing interpretation of recent lab results.

## 2024-01-01 RX ORDER — PANTOPRAZOLE SODIUM 40 MG/1
TABLET, DELAYED RELEASE ORAL
Qty: 90 TABLET | Refills: 1 | Status: SHIPPED | OUTPATIENT
Start: 2024-01-01

## 2024-01-01 RX ORDER — LEVOTHYROXINE SODIUM 88 MCG
TABLET ORAL
Qty: 90 TABLET | Refills: 1 | Status: SHIPPED | OUTPATIENT
Start: 2024-01-01

## 2024-01-17 PROBLEM — Z86.011 HX OF MENINGIOMA OF THE BRAIN: Status: ACTIVE | Noted: 2024-01-17

## 2024-03-28 RX ORDER — LOSARTAN POTASSIUM 50 MG/1
TABLET ORAL
Qty: 90 TABLET | Refills: 1 | Status: SHIPPED | OUTPATIENT
Start: 2024-03-28

## 2024-03-28 NOTE — TELEPHONE ENCOUNTER
Medication:   Requested Prescriptions     Pending Prescriptions Disp Refills    losartan (COZAAR) 50 MG tablet [Pharmacy Med Name: LOSARTAN TABS 50MG] 90 tablet 3     Sig: TAKE 1 TABLET DAILY     Last Filled:  # 90 with 1 refill on10/04/2023    Last appt: 12/15/2023   Next appt: Visit date not found    Last OARRS:        No data to display

## 2024-04-22 ENCOUNTER — TELEPHONE (OUTPATIENT)
Dept: INTERNAL MEDICINE CLINIC | Age: 79
End: 2024-04-22

## 2024-04-22 NOTE — TELEPHONE ENCOUNTER
----- Message from John Mcnamara sent at 4/22/2024  1:09 PM EDT -----  Regarding: ECC Message to Provider  ECC Message to Provider    Relationship to Patient: Self     Additional Information: Patient needs to change provider since the current provider will be leaving the practice  --------------------------------------------------------------------------------------------------------------------------    Call Back Information: OK to leave message on voicemail  Preferred Call Back Number: [phone]

## 2024-06-18 LAB
ALBUMIN: 4.1 G/DL (ref 3.5–5.7)
ALP BLD-CCNC: 92 IU/L (ref 35–135)
ALT SERPL-CCNC: 15 IU/L (ref 10–60)
ANION GAP SERPL CALCULATED.3IONS-SCNC: 7 MMOL/L (ref 4–16)
AST SERPL-CCNC: 25 IU/L (ref 10–40)
B-TYPE NATRIURETIC PEPTIDE: 62 PG/ML (ref 0–95)
BASOPHILS ABSOLUTE: 0.09 THOU/MCL (ref 0–0.2)
BASOPHILS RELATIVE PERCENT: 2 %
BILIRUB SERPL-MCNC: 0.5 MG/DL (ref 0–1.2)
BUN BLDV-MCNC: 18 MG/DL (ref 8–26)
CALCIUM SERPL-MCNC: 9.7 MG/DL (ref 8.5–10.4)
CHLORIDE BLD-SCNC: 104 MEQ/L (ref 98–111)
CO2: 28 MMOL/L (ref 21–31)
CREAT SERPL-MCNC: 0.8 MG/DL (ref 0.6–1.2)
ECHINOCYTES: ABNORMAL
EGFR (CKD-EPI): 74 ML/MIN/1.73 M2
EOSINOPHILS ABSOLUTE: 0 THOU/MCL (ref 0.03–0.45)
EOSINOPHILS RELATIVE PERCENT: 0 %
GLUCOSE BLD-MCNC: 85 MG/DL (ref 70–99)
GRANULOCYTE ABSOLUTE COUNT: 0.94 THOU/MCL (ref 1.8–7.7)
HCT VFR BLD CALC: 37.4 % (ref 36–46)
HEMOGLOBIN: 12 G/DL (ref 12–15.2)
LYMPHOCYTES # BLD: 63 %
LYMPHOCYTES ABSOLUTE: 2.96 THOU/MCL (ref 1–4)
MCH RBC QN AUTO: 29.2 PG (ref 27–33)
MCHC RBC AUTO-ENTMCNC: 32.2 G/DL (ref 32–36)
MCV RBC AUTO: 90.9 FL (ref 82–97)
MONOCYTES ABSOLUTE: 0.71 THOU/MCL (ref 0.2–0.9)
MONOCYTES RELATIVE PERCENT: 15 %
NUCLEATED RED BLOOD CELLS: 3 /100 WBC
OVALOCYTES: ABNORMAL
PDW BLD-RTO: 15.7 % (ref 12.3–17)
PLATELET # BLD: 142 THOU/MCL (ref 140–375)
PMV BLD AUTO: 9.9 FL (ref 7.4–11.5)
POTASSIUM SERPL-SCNC: 4.3 MEQ/L (ref 3.6–5.1)
RBC # BLD: 4.12 MIL/MCL (ref 3.8–5.2)
SEG NEUTROPHILS: 20 %
SODIUM BLD-SCNC: 139 MEQ/L (ref 135–145)
TOTAL PROTEIN: 7.3 G/DL (ref 6–8)
URIC ACID: 4.1 MG/DL (ref 2.3–6.6)
WBC # BLD: 4.7 THOU/MCL (ref 3.6–10.5)

## 2024-07-12 ENCOUNTER — OFFICE VISIT (OUTPATIENT)
Dept: ORTHOPEDIC SURGERY | Age: 79
End: 2024-07-12

## 2024-07-12 VITALS — BODY MASS INDEX: 35.52 KG/M2 | RESPIRATION RATE: 12 BRPM | HEIGHT: 66 IN | WEIGHT: 221 LBS

## 2024-07-12 DIAGNOSIS — M76.821 POSTERIOR TIBIAL TENDINITIS OF RIGHT LOWER EXTREMITY: Primary | ICD-10-CM

## 2024-07-12 DIAGNOSIS — M79.671 RIGHT FOOT PAIN: Primary | ICD-10-CM

## 2024-07-12 DIAGNOSIS — M76.821 POSTERIOR TIBIAL TENDINITIS OF RIGHT LOWER EXTREMITY: ICD-10-CM

## 2024-07-12 RX ORDER — METHYLPREDNISOLONE 4 MG/1
TABLET ORAL
Qty: 1 KIT | Refills: 0 | Status: SHIPPED | OUTPATIENT
Start: 2024-07-12 | End: 2024-07-18

## 2024-07-12 NOTE — PROGRESS NOTES
7/12/2024     Reason for visit:  Right foot/ankle pain     History of Present Illness:  Patient is a very pleasant 79-year-old female who presents to after-hours clinic for evaluation of her right foot and ankle.  She complains of a couple week history of worsening right foot and ankle pain.  She denies any specific injury or trauma.  She localizes most pain to the medial aspect of her ankle and foot.  She denies any numbness or tingling.  She denies any forefoot pain.  She is she does mention a history of plantar fasciitis, but says this pain is different.  Her pain is worse with walking, and standing.  It improves with rest, Tylenol, ibuprofen, and ice.  She denies any significant swelling.  She denies any fever or chills today.    Medical History:  Past Medical History:   Diagnosis Date    Allergic rhinitis     Bell's palsy 01/2005    Left    Cervical spondylosis     C5-7    Closed fracture of fifth metatarsal bone 03/2011    Left- displaced    Colorectal polyps     Essential hypertension     GERD (gastroesophageal reflux disease)      EGD 8/08- mild gastritis    Goiter     Multinodular- s/p thyroidectomy 7/04    Hyperlipidemia     Irregular uterine bleeding     Meningiomas, multiple (HCC)     Menorrhagia     Migraine headache     Osteoarthritis     Post-surgical hypothyroidism     Seizure disorder (HCC)     Secondary to meningioma    Spinal stenosis, lumbar     L3-4, L4-5: moderately severe    Tricuspid regurgitation     Trigger finger     Right    Vaginal prolapse     Venous insufficiency       Past Surgical History:   Procedure Laterality Date    ARTHROPLASTY Left 09/2005    4th toe    BLEPHAROPLASTY Bilateral 05/2006    lower eyelid    BRAIN MENINGIOMA EXCISION  1992, 2000    CARPAL TUNNEL RELEASE Bilateral 1991, 1993, 2003    CHOLECYSTECTOMY  2002    EYE SURGERY Left 11/10/2020    REVISION LEFT ENDOSCOPIC DACRYOCYSTORHINOSTOMY/ DACRYOCYSTORHINOSTOMY WITH STENT PLACEMENT, LEFT SIDE     FINGER TRIGGER

## 2024-07-19 ENCOUNTER — HOSPITAL ENCOUNTER (OUTPATIENT)
Dept: PHYSICAL THERAPY | Age: 79
Setting detail: THERAPIES SERIES
Discharge: HOME | End: 2024-07-19
Payer: MEDICARE

## 2024-07-19 DIAGNOSIS — R26.2 DIFFICULTY WALKING: Primary | ICD-10-CM

## 2024-07-19 DIAGNOSIS — R29.898 WEAKNESS OF RIGHT LOWER EXTREMITY: ICD-10-CM

## 2024-07-19 PROCEDURE — 97161 PT EVAL LOW COMPLEX 20 MIN: CPT

## 2024-07-19 PROCEDURE — 97530 THERAPEUTIC ACTIVITIES: CPT

## 2024-07-19 PROCEDURE — 97110 THERAPEUTIC EXERCISES: CPT

## 2024-07-19 NOTE — PLAN OF CARE
motion, maintain or improve muscular strength or increase flexibility, following either an injury or surgery.  (25610) THERAPEUTIC ACTIVITY - use of dynamic activities to improve functional performance. (Ex include squatting, ascending/descending stairs, walking, bending, lifting, catching, throwing, pushing, pulling, jumping.)  Direct, one on one contact, billed in 15-minute increments.    GOALS     Patient stated goal: return to walking outside to water her plants without boot or pain, community ambulation without pain and walking up/down stairs with reciprocal pattern.   [] Progressing: [] Met: [] Not Met: [] Adjusted    Therapist goals for Patient:   Short Term Goals: To be achieved in: 2 weeks  1. Independent in HEP and progression per patient tolerance, in order to prevent re-injury.   [] Progressing: [] Met: [] Not Met: [] Adjusted  2. Patient will have a decrease in pain to <4/10 to facilitate improvement in movement, function, and ADLs as indicated by Functional Deficits.  [] Progressing: [] Met: [] Not Met: [] Adjusted    Long Term Goals: To be achieved in: 8 weeks  1. Disability index score of 20% or less for the LEFS to assist with reaching prior level of function with activities such as community navigation with pain less than or equal to 4/10 at worst.  [] Progressing: [] Met: [] Not Met: [] Adjusted  2. Patient will demonstrate increased AROM of R ankle DF to 15 degrees without pain to allow for proper joint functioning to enable patient to ambulate up/down stairs with reciprocal pattern and pain less than or equal to 4/10 at worst.   [] Progressing: [] Met: [] Not Met: [] Adjusted  3. Patient will demonstrate increased Strength of R ankle to at least 4+/5 throughout without pain to allow for proper functional mobility to enable patient to return to getting in/out of a chair without ankle pain.   [] Progressing: [] Met: [] Not Met: [] Adjusted  4. Patient will return to walking through her yard

## 2024-07-22 ENCOUNTER — HOSPITAL ENCOUNTER (OUTPATIENT)
Dept: PHYSICAL THERAPY | Age: 79
Setting detail: THERAPIES SERIES
Discharge: HOME | End: 2024-07-22
Payer: MEDICARE

## 2024-07-22 PROCEDURE — 97110 THERAPEUTIC EXERCISES: CPT

## 2024-07-22 PROCEDURE — 97140 MANUAL THERAPY 1/> REGIONS: CPT

## 2024-07-22 NOTE — FLOWSHEET NOTE
[facility] - Outpatient Rehabilitation and Therapy [address], [place] [address] office: [phone] fax: [phone]     Physical Therapy: TREATMENT/PROGRESS NOTE   Patient: Flaca Bose (79 y.o. female)   Examination Date: 2024   :  1945 MRN: [id number]   Visit #: 2   Insurance Allowable Auth Needed   BMN []Yes    [x]No    Insurance: Payor: MEDICARE / Plan: MEDICARE PART A AND B / Product Type: *No Product type* /   Insurance ID: [id number] - (Medicare)  Secondary Insurance (if applicable):    Treatment Diagnosis: -    ICD-10-CM    1. Difficulty walking  R26.2       2. Weakness of right lower extremity  R29.898          Medical Diagnosis:  Posterior tibial tendinitis of right lower extremity [M76.821]   Referring Physician: Sravan Conroy PA-C  PCP: Norma Villatoro MD     Plan of care signed (Y/N):     Date of Patient follow up with Physician:      Progress Report/POC: NO  POC update due: (10 visits /OR AUTH LIMITS, whichever is less) - 2024                                             Precautions/ Contra-indications:           Latex allergy:  YES  Pacemaker:    NO  Contraindications for Manipulation: NA  Date of Surgery: n/a  Other:    Red Flags:  None    C-SSRS Triggered by Intake questionnaire:   Patient answered 'NO' to both behavioral questions on intake.  No further screening warranted    Preferred Language for Healthcare:   [x] English       [] other:    SUBJECTIVE EXAMINATION     Patient stated complaint: Pt reports she has been doing OK, has not used the walking boot since initial evaluation but has been using her shoes with the arch supports and feels they help a lot. HEP going well without issues.      Test used Initial score  2024   Pain Summary VAS 0-9/10    Functional questionnaire LEFS 49 / 39%    Other:                OBJECTIVE EXAMINATION     24  ROM/Strength: (Blank cells denote NT) -     Mvmt (norm) AROM

## 2024-07-26 ENCOUNTER — HOSPITAL ENCOUNTER (OUTPATIENT)
Dept: PHYSICAL THERAPY | Age: 79
Setting detail: THERAPIES SERIES
Discharge: HOME | End: 2024-07-26
Payer: MEDICARE

## 2024-07-26 PROCEDURE — 97110 THERAPEUTIC EXERCISES: CPT | Performed by: SPECIALIST/TECHNOLOGIST

## 2024-07-26 PROCEDURE — 97112 NEUROMUSCULAR REEDUCATION: CPT | Performed by: SPECIALIST/TECHNOLOGIST

## 2024-07-26 PROCEDURE — 97140 MANUAL THERAPY 1/> REGIONS: CPT | Performed by: SPECIALIST/TECHNOLOGIST

## 2024-07-26 NOTE — FLOWSHEET NOTE
Disorders  [] Autism (F84.0)  [] Cerebral Palsy (G80)  [] Down Syndrome (Q90.9)  [] Developmental delay     Psychological Disorders  [] Anxiety (F41.9)  [] Depression (F32.9)   [] Bipolar  [] Other:      Prior surgeries  [] Involved limb  [] Previous spinal surgery  []  section birth  [] Hysterectomy  [] Bowel / bladder surgery  [] Other relevant surgeries        Other conditions  [] Vertigo  [] Syncope  [] Kidney Failure  [] Cancer  [] Pregnancy  [] Incontinence   Other Co-morbidities not listed:       OBJECTIVE EXAMINATION    RT ankle collapsed medial arch/ pes planus with ambulation in foot in pronated foot position with slight increased stressors to navicular medially. Mild ttp over medial ankle/ PTT with palpation today.  Decreased DF with AROM  24  ROM/Strength: (Blank cells denote NT) -     Mvmt (norm) AROM L AROM R Notes PROM L PROM R Notes     LUMBAR Flex (90)         Ext (25)         SB (25)          Rotation (30)             HIP Flex (120)          Abd (45)          ER (50)          IR (45)          Ext (20)         KNEE Flex (140)          Ext (0)           ANKLE DF (20)  7        PF (50)  35        Inversion (30)  15        Eversion (20)  10*           MMT L          MMT  R Notes     LUMBAR  Flexion       Extension       Lateral flexion        Rotation          MMT L MMT R Notes       HIP  Flexion        Abduction        ER        IR        Extension      KNEE  Flexion        Extension        ANKLE  DF  4      PF  4-      Inversion  4-* Pain with assessment     Eversion  4-* Pain with assessment       Gastroc flexibility on R: 7 degrees of DF    Exercises/Interventions     Therapeutic Ex (52392)  resistance Sets/time Reps Notes/Cues/Progressions   Towel gastroc stretch   IB stretch   Seated hamstring stretch  20\" 3xea    4 way ankle with band lime 1 10 each HEP      TR   2 10x 7/26 w/ HHA table                                                    Manual Intervention (87780)  TIME

## 2024-07-31 ENCOUNTER — HOSPITAL ENCOUNTER (OUTPATIENT)
Dept: PHYSICAL THERAPY | Age: 79
Setting detail: THERAPIES SERIES
Discharge: HOME | End: 2024-07-31
Payer: MEDICARE

## 2024-07-31 PROCEDURE — 97140 MANUAL THERAPY 1/> REGIONS: CPT

## 2024-07-31 NOTE — FLOWSHEET NOTE
[facility] - Outpatient Rehabilitation and Therapy [address], [place] [address] office: [phone] fax: [phone]       Physical Therapy: TREATMENT/PROGRESS NOTE   Patient: Flaca Bose (79 y.o. female)   Examination Date: 2024   :  1945 MRN: [id number]   Visit #: 4   Insurance Allowable Auth Needed   BMN []Yes    [x]No    Insurance: Payor: MEDICARE / Plan: MEDICARE PART A AND B / Product Type: *No Product type* /   Insurance ID: [id number] - (Medicare)  Secondary Insurance (if applicable):    Treatment Diagnosis: -    ICD-10-CM    1. Difficulty walking  R26.2       2. Weakness of right lower extremity  R29.898          Medical Diagnosis:  Posterior tibial tendinitis of right lower extremity [M76.821]   Referring Physician: Sravan Conroy PA-C  PCP: Norma Villatoro MD     Plan of care signed (Y/N):     Date of Patient follow up with Physician:      Progress Report/POC: NO  POC update due: (10 visits /OR AUTH LIMITS, whichever is less) - 2024                                             Precautions/ Contra-indications:           Latex allergy:  YES  Pacemaker:    NO  Contraindications for Manipulation: NA  Date of Surgery: n/a  Other:    Red Flags:  None    C-SSRS Triggered by Intake questionnaire:   Patient answered 'NO' to both behavioral questions on intake.  No further screening warranted    Preferred Language for Healthcare:   [x] English       [] other:    SUBJECTIVE EXAMINATION     Patient stated complaint: Pt reports that she does not want to do much today. Fell on  d/t her L foot giving out and broke 2 ribs. She also has a large bruise on her shoulder. She consents to manual therapy but not exercise today.       Test used Initial score  2024   Pain Summary VAS 0-9/10 9   Functional questionnaire LEFS 49 / 39%    Other:                OBJECTIVE EXAMINATION    RT ankle collapsed medial arch/ pes planus with

## 2024-08-01 ENCOUNTER — HOSPITAL ENCOUNTER (OUTPATIENT)
Dept: GENERAL RADIOLOGY | Age: 79
Discharge: HOME | End: 2024-08-01
Payer: MEDICARE

## 2024-08-01 ENCOUNTER — HOSPITAL ENCOUNTER (OUTPATIENT)
Age: 79
Discharge: HOME | End: 2024-08-01
Payer: MEDICARE

## 2024-08-01 DIAGNOSIS — M25.532 PAIN IN LEFT WRIST: ICD-10-CM

## 2024-08-01 DIAGNOSIS — R07.82 INTERCOSTAL PAIN: ICD-10-CM

## 2024-08-01 DIAGNOSIS — M25.512 LEFT SHOULDER PAIN, UNSPECIFIED CHRONICITY: ICD-10-CM

## 2024-08-01 PROCEDURE — 71111 X-RAY EXAM RIBS/CHEST4/> VWS: CPT

## 2024-08-01 PROCEDURE — 72050 X-RAY EXAM NECK SPINE 4/5VWS: CPT

## 2024-08-01 PROCEDURE — 73030 X-RAY EXAM OF SHOULDER: CPT

## 2024-08-01 PROCEDURE — 73110 X-RAY EXAM OF WRIST: CPT

## 2024-08-02 ENCOUNTER — APPOINTMENT (OUTPATIENT)
Dept: PHYSICAL THERAPY | Age: 79
End: 2024-08-02
Payer: MEDICARE

## 2024-08-07 ENCOUNTER — APPOINTMENT (OUTPATIENT)
Dept: PHYSICAL THERAPY | Age: 79
End: 2024-08-07
Payer: MEDICARE

## 2024-08-09 ENCOUNTER — APPOINTMENT (OUTPATIENT)
Dept: PHYSICAL THERAPY | Age: 79
End: 2024-08-09
Payer: MEDICARE

## 2024-08-14 ENCOUNTER — HOSPITAL ENCOUNTER (OUTPATIENT)
Dept: PHYSICAL THERAPY | Age: 79
Setting detail: THERAPIES SERIES
Discharge: HOME | End: 2024-08-14
Payer: MEDICARE

## 2024-08-14 PROCEDURE — 97530 THERAPEUTIC ACTIVITIES: CPT

## 2024-08-14 NOTE — PLAN OF CARE
allow for proper functional mobility to enable patient to return to getting in/out of a chair without ankle pain.   [x] Progressing: [] Met: [] Not Met: [] Adjusted  4. Patient will return to walking through her yard without boot and without increased symptoms or restriction.   [x] Progressing: [] Met: [] Not Met: [] Adjusted  5. Patient will be able to ambulate community distances with no boot and no pain  [x] Progressing: [] Met: [] Not Met: [] Adjusted     Overall Progression Towards Functional goals/ Treatment Progress Update:  [x] Patient is progressing as expected towards functional goals listed.    [] Progression is slowed due to complexities/Impairments listed.  [] Progression has been slowed due to co-morbidities.  [] Plan just implemented, too soon (<30days) to assess goals progression   [] Goals require adjustment due to lack of progress  [] Patient is not progressing as expected and requires additional follow up with physician  [] Other:     TREATMENT PLAN     Frequency/Duration: 1-2x/week for 8 weeks for the following treatment interventions:    Interventions:  Therapeutic Exercise (79575) including: strength training, ROM, and functional mobility  Therapeutic Activities (31738) including: functional mobility training and education.  Neuromuscular Re-education (00809) activation and proprioception, including postural re-education.    Manual Therapy (27428) as indicated to include: Passive Range of Motion, Gr I-IV mobilizations, Soft Tissue Mobilization, and Trigger Point Release  Modalities as needed that may include: Cryotherapy, Electrical Stimulation, and Vasoneumatic Compression  Patient education on joint protection, postural re-education, activity modification, and progression of HEP    Plan: normalize gait pattern with ADLs, walking outside and stairs as able.   Pt. advised to stretch prior to walking outside and icing afterwords. Improve flexibility during the day to decrease tension in her

## 2024-08-16 ENCOUNTER — APPOINTMENT (OUTPATIENT)
Dept: PHYSICAL THERAPY | Age: 79
End: 2024-08-16
Payer: MEDICARE

## 2024-08-16 ENCOUNTER — HOSPITAL ENCOUNTER (OUTPATIENT)
Dept: PHYSICAL THERAPY | Age: 79
Setting detail: THERAPIES SERIES
Discharge: HOME | End: 2024-08-16
Payer: MEDICARE

## 2024-08-16 PROCEDURE — 97113 AQUATIC THERAPY/EXERCISES: CPT

## 2024-08-16 NOTE — FLOWSHEET NOTE
[facility] - Outpatient Rehabilitation and Therapy [address], [place] [address] office: [phone] fax: [phone]     Physical Therapy: TREATMENT/PROGRESS NOTE   Patient: Flaca Bose (79 y.o. female)   Examination Date: 2024   :  1945 MRN: [id number]   Visit #: 6   Insurance Allowable Auth Needed   BMN []Yes    [x]No    Insurance: Payor: MEDICARE / Plan: MEDICARE PART A AND B / Product Type: *No Product type* /   Insurance ID: [id number] - (Medicare)  Secondary Insurance (if applicable):    Treatment Diagnosis: -    ICD-10-CM    1. Difficulty walking  R26.2       2. Weakness of right lower extremity  R29.898          Medical Diagnosis:  Posterior tibial tendinitis of right lower extremity [M76.821]   Referring Physician: Sravan Conroy PA-C  PCP: Norma Villatoro MD     Plan of care signed (Y/N):     Date of Patient follow up with Physician:      Progress Report/POC: No  POC update due: (10 visits /OR AUTH LIMITS, whichever is less) - 2024                                             Precautions/ Contra-indications:           Latex allergy:  YES  Pacemaker:    NO  Contraindications for Manipulation: NA  Date of Surgery: n/a  Other:    Red Flags:  None    C-SSRS Triggered by Intake questionnaire:   Patient answered 'NO' to both behavioral questions on intake.  No further screening warranted    Preferred Language for Healthcare:   [x] English       [] other:    SUBJECTIVE EXAMINATION     Patient stated complaint: Having moderate low back and R hip pain this morning.  Here for first aquatic session.       Test used Initial score  2024   Pain Summary VAS 0-9/10 5   Functional questionnaire LEFS 49 / 39% 12   Other:                OBJECTIVE EXAMINATION    RT ankle collapsed medial arch/ pes planus with ambulation in foot in pronated foot position with slight increased stressors to navicular medially. Mild ttp over medial

## 2024-08-21 ENCOUNTER — HOSPITAL ENCOUNTER (OUTPATIENT)
Dept: PHYSICAL THERAPY | Age: 79
Setting detail: THERAPIES SERIES
End: 2024-08-21
Payer: MEDICARE

## 2024-08-23 ENCOUNTER — APPOINTMENT (OUTPATIENT)
Dept: PHYSICAL THERAPY | Age: 79
End: 2024-08-23
Payer: MEDICARE

## 2024-08-26 ENCOUNTER — HOSPITAL ENCOUNTER (OUTPATIENT)
Dept: PHYSICAL THERAPY | Age: 79
Setting detail: THERAPIES SERIES
Discharge: HOME | End: 2024-08-26
Payer: MEDICARE

## 2024-08-26 PROCEDURE — 97113 AQUATIC THERAPY/EXERCISES: CPT

## 2024-08-26 NOTE — FLOWSHEET NOTE
[facility] - Outpatient Rehabilitation and Therapy [address], [place] [address] office: [phone] fax: [phone]     Physical Therapy: TREATMENT/PROGRESS NOTE   Patient: Flaca Bose (79 y.o. female)   Examination Date: 2024   :  1945 MRN: [id number]   Visit #: 7   Insurance Allowable Auth Needed   BMN []Yes    [x]No    Insurance: Payor: MEDICARE / Plan: MEDICARE PART A AND B / Product Type: *No Product type* /   Insurance ID: [id number] - (Medicare)  Secondary Insurance (if applicable):    Treatment Diagnosis: -    ICD-10-CM    1. Difficulty walking  R26.2       2. Weakness of right lower extremity  R29.898          Medical Diagnosis:  Posterior tibial tendinitis of right lower extremity [M76.821]   Referring Physician: Sravan Conroy PA-C  PCP: Norma Villatoro MD     Plan of care signed (Y/N):     Date of Patient follow up with Physician:      Progress Report/POC: No  POC update due: (10 visits /OR AUTH LIMITS, whichever is less) - 2024                                             Precautions/ Contra-indications:           Latex allergy:  YES  Pacemaker:    NO  Contraindications for Manipulation: NA  Date of Surgery: n/a  Other:    Red Flags:  None    C-SSRS Triggered by Intake questionnaire:   Patient answered 'NO' to both behavioral questions on intake.  No further screening warranted    Preferred Language for Healthcare:   [x] English       [] other:    SUBJECTIVE EXAMINATION     Patient stated complaint: Pt states that her pain is feeling better today. States that she felt good after last session.        Test used Initial score  2024   Pain Summary VAS 0-9/10 6   Functional questionnaire LEFS 49 / 39% 12   Other:                OBJECTIVE EXAMINATION    RT ankle collapsed medial arch/ pes planus with ambulation in foot in pronated foot position with slight increased stressors to navicular medially. Mild ttp over  pain and walking up/down stairs with reciprocal pattern.   [x] Progressing: [] Met: [] Not Met: [] Adjusted    Therapist goals for Patient:   Short Term Goals: To be achieved in: 2 weeks  1. Independent in HEP and progression per patient tolerance, in order to prevent re-injury.   [x] Progressing: [] Met: [] Not Met: [] Adjusted  2. Patient will have a decrease in pain to <4/10 to facilitate improvement in movement, function, and ADLs as indicated by Functional Deficits.  [x] Progressing: [] Met: [] Not Met: [] Adjusted    Long Term Goals: To be achieved in: 8 weeks  1. Disability index score of 20% or less for the LEFS to assist with reaching prior level of function with activities such as community navigation with pain less than or equal to 4/10 at worst.  [x] Progressing: [] Met: [] Not Met: [] Adjusted  2. Patient will demonstrate increased AROM of R ankle DF to 15 degrees without pain to allow for proper joint functioning to enable patient to ambulate up/down stairs with reciprocal pattern and pain less than or equal to 4/10 at worst.   [x] Progressing: [] Met: [] Not Met: [] Adjusted  3. Patient will demonstrate increased Strength of R ankle to at least 4+/5 throughout without pain to allow for proper functional mobility to enable patient to return to getting in/out of a chair without ankle pain.   [x] Progressing: [] Met: [] Not Met: [] Adjusted  4. Patient will return to walking through her yard without boot and without increased symptoms or restriction.   [x] Progressing: [] Met: [] Not Met: [] Adjusted  5. Patient will be able to ambulate community distances with no boot and no pain  [x] Progressing: [] Met: [] Not Met: [] Adjusted     Overall Progression Towards Functional goals/ Treatment Progress Update:  [x] Patient is progressing as expected towards functional goals listed.    [] Progression is slowed due to complexities/Impairments listed.  [] Progression has been slowed due to co-morbidities.  []

## 2024-08-28 ENCOUNTER — APPOINTMENT (OUTPATIENT)
Dept: PHYSICAL THERAPY | Age: 79
End: 2024-08-28
Payer: MEDICARE

## 2024-08-30 ENCOUNTER — HOSPITAL ENCOUNTER (OUTPATIENT)
Dept: PHYSICAL THERAPY | Age: 79
Setting detail: THERAPIES SERIES
End: 2024-08-30
Payer: MEDICARE

## 2024-08-30 ENCOUNTER — APPOINTMENT (OUTPATIENT)
Dept: PHYSICAL THERAPY | Age: 79
End: 2024-08-30
Payer: MEDICARE

## 2024-09-04 ENCOUNTER — APPOINTMENT (OUTPATIENT)
Dept: PHYSICAL THERAPY | Age: 79
End: 2024-09-04
Payer: MEDICARE

## 2024-09-06 ENCOUNTER — APPOINTMENT (OUTPATIENT)
Dept: PHYSICAL THERAPY | Age: 79
End: 2024-09-06
Payer: MEDICARE

## 2024-09-09 ENCOUNTER — HOSPITAL ENCOUNTER (OUTPATIENT)
Dept: PHYSICAL THERAPY | Age: 79
Setting detail: THERAPIES SERIES
Discharge: HOME | End: 2024-09-09
Payer: MEDICARE

## 2024-09-09 PROCEDURE — 97113 AQUATIC THERAPY/EXERCISES: CPT

## 2024-09-11 ENCOUNTER — APPOINTMENT (OUTPATIENT)
Dept: PHYSICAL THERAPY | Age: 79
End: 2024-09-11
Payer: MEDICARE

## 2024-09-13 ENCOUNTER — APPOINTMENT (OUTPATIENT)
Dept: PHYSICAL THERAPY | Age: 79
End: 2024-09-13
Payer: MEDICARE

## 2024-09-16 ENCOUNTER — HOSPITAL ENCOUNTER (OUTPATIENT)
Dept: PHYSICAL THERAPY | Age: 79
Setting detail: THERAPIES SERIES
Discharge: HOME | End: 2024-09-16
Payer: MEDICARE

## 2024-09-16 ENCOUNTER — APPOINTMENT (OUTPATIENT)
Dept: PHYSICAL THERAPY | Age: 79
End: 2024-09-16
Payer: MEDICARE

## 2024-09-16 PROCEDURE — 97530 THERAPEUTIC ACTIVITIES: CPT

## 2024-09-16 PROCEDURE — 97110 THERAPEUTIC EXERCISES: CPT

## 2024-09-16 PROCEDURE — 97112 NEUROMUSCULAR REEDUCATION: CPT

## 2024-09-23 ENCOUNTER — APPOINTMENT (OUTPATIENT)
Dept: PHYSICAL THERAPY | Age: 79
End: 2024-09-23
Payer: MEDICARE

## 2024-09-23 ENCOUNTER — HOSPITAL ENCOUNTER (OUTPATIENT)
Dept: PHYSICAL THERAPY | Age: 79
Setting detail: THERAPIES SERIES
Discharge: HOME | End: 2024-09-23
Payer: MEDICARE

## 2024-09-23 PROCEDURE — 97110 THERAPEUTIC EXERCISES: CPT

## 2024-09-23 PROCEDURE — 97112 NEUROMUSCULAR REEDUCATION: CPT

## 2024-09-23 PROCEDURE — 97530 THERAPEUTIC ACTIVITIES: CPT

## 2024-09-30 ENCOUNTER — APPOINTMENT (OUTPATIENT)
Dept: PHYSICAL THERAPY | Age: 79
End: 2024-09-30
Payer: MEDICARE

## 2024-09-30 ENCOUNTER — HOSPITAL ENCOUNTER (OUTPATIENT)
Dept: PHYSICAL THERAPY | Age: 79
Setting detail: THERAPIES SERIES
Discharge: HOME | End: 2024-09-30
Payer: MEDICARE

## 2024-09-30 PROCEDURE — 97110 THERAPEUTIC EXERCISES: CPT

## 2024-09-30 PROCEDURE — 97112 NEUROMUSCULAR REEDUCATION: CPT

## 2024-09-30 NOTE — FLOWSHEET NOTE
interventions:    Interventions:  Therapeutic Exercise (12638) including: strength training, ROM, and functional mobility  Therapeutic Activities (84619) including: functional mobility training and education.  Neuromuscular Re-education (25039) activation and proprioception, including postural re-education.    Manual Therapy (74614) as indicated to include: Passive Range of Motion, Gr I-IV mobilizations, Soft Tissue Mobilization, and Trigger Point Release  Modalities as needed that may include: Cryotherapy, Electrical Stimulation, and Vasoneumatic Compression  Patient education on joint protection, postural re-education, activity modification, and progression of HEP    Plan: normalize gait pattern with ADLs, walking outside and stairs as able.   Pt. advised to stretch prior to walking outside and icing afterwords. Improve flexibility during the day to decrease tension in her posterior leg related to prolonged sitting.   Electronically Signed by OSMAR OSBORN PT   Date: 09/30/2024     Note: Portions of this note have been templated and/or copied from initial evaluation, reassessments and prior notes for documentation efficiency.    Note: If patient does not return for scheduled/recommended follow up visits, this note will serve as a discharge from care along with the most recent update on progress.

## 2024-10-07 ENCOUNTER — APPOINTMENT (OUTPATIENT)
Dept: PHYSICAL THERAPY | Age: 79
End: 2024-10-07
Payer: MEDICARE

## 2024-10-11 ENCOUNTER — APPOINTMENT (OUTPATIENT)
Dept: PHYSICAL THERAPY | Age: 79
End: 2024-10-11
Payer: MEDICARE

## 2024-10-14 ENCOUNTER — APPOINTMENT (OUTPATIENT)
Dept: PHYSICAL THERAPY | Age: 79
End: 2024-10-14
Payer: MEDICARE

## 2024-10-17 ENCOUNTER — HOSPITAL ENCOUNTER (OUTPATIENT)
Dept: PHYSICAL THERAPY | Age: 79
Setting detail: THERAPIES SERIES
Discharge: HOME | End: 2024-10-17
Payer: MEDICARE

## 2024-10-17 PROCEDURE — 97140 MANUAL THERAPY 1/> REGIONS: CPT

## 2024-10-17 PROCEDURE — 97530 THERAPEUTIC ACTIVITIES: CPT

## 2024-10-17 PROCEDURE — 97110 THERAPEUTIC EXERCISES: CPT

## 2024-10-17 NOTE — PLAN OF CARE
[facility] - Outpatient Rehabilitation and Therapy [address], [place] [address] office: [phone] fax: [phone]    Physical Therapy Re-Certification Plan of Care    Dear Sravan Conroy PA-C  ,    We had the pleasure of treating the following patient for physical therapy services at [facility] Therapy. A summary of our findings can be found in the updated assessment below.  This includes our plan of care.  If you have any questions or concerns regarding these findings, please do not hesitate to contact me at the office phone number checked above.  Thank you for the referral.     Physician Signature:________________________________Date:__________________  By signing above (or electronic signature), therapist's plan is approved by physician      Functional Outcome: LEFS: 40; 50%  Flaca Bose 1945 continues to present with functional deficits in ROM, joint mobility, strength symmetry, flexibility, endurance of strength, eccentric control, and muscle activation  limiting ability with walking on even ground, walking on uneven ground, managing community ambulation, walking up/down stairs, navigate curbs/steps, transitions between positions, managing bed mobility, reaching overhead, carrying items, ADLs, light home activity, and heavy home activity .  During therapy this date, patient required verbal cueing, muscle facilitation, modification of technique, progression of exercises and program, and manual interventions for exercise progression, improving proper muscle recruitment and activation/motor control patterns, modulating pain, promoting relaxation, reduce/eliminate soft tissue swelling/inflammation/restriction, improving soft tissue extensibility, static and dynamic balance, and improving postural awareness. Patient will continue to benefit from ongoing evaluation and advanced clinical decision from a Physical Therapist to improve pain

## 2024-10-21 ENCOUNTER — HOSPITAL ENCOUNTER (OUTPATIENT)
Dept: PHYSICAL THERAPY | Age: 79
Setting detail: THERAPIES SERIES
Discharge: HOME | End: 2024-10-21
Payer: MEDICARE

## 2024-10-21 PROCEDURE — 97112 NEUROMUSCULAR REEDUCATION: CPT

## 2024-10-21 PROCEDURE — 97110 THERAPEUTIC EXERCISES: CPT

## 2024-10-21 PROCEDURE — 97140 MANUAL THERAPY 1/> REGIONS: CPT

## 2024-10-21 NOTE — FLOWSHEET NOTE
training, ROM, and functional mobility  Therapeutic Activities (33990) including: functional mobility training and education.  Neuromuscular Re-education (14714) activation and proprioception, including postural re-education.    Manual Therapy (73489) as indicated to include: Passive Range of Motion, Gr I-IV mobilizations, Soft Tissue Mobilization, and Trigger Point Release  Modalities as needed that may include: Cryotherapy, Electrical Stimulation, and Vasoneumatic Compression  Patient education on joint protection, postural re-education, activity modification, and progression of HEP    Plan: normalize gait pattern with ADLs, walking outside and stairs as able.   Pt. advised to stretch prior to walking outside and icing afterwords. Improve flexibility during the day to decrease tension in her posterior leg related to prolonged sitting.   Electronically Signed by OSMAR OSBORN PT   Date: 10/21/2024     Note: Portions of this note have been templated and/or copied from initial evaluation, reassessments and prior notes for documentation efficiency.    Note: If patient does not return for scheduled/recommended follow up visits, this note will serve as a discharge from care along with the most recent update on progress.

## 2024-10-24 ENCOUNTER — HOSPITAL ENCOUNTER (OUTPATIENT)
Dept: PHYSICAL THERAPY | Age: 79
Setting detail: THERAPIES SERIES
Discharge: HOME | End: 2024-10-24
Payer: MEDICARE

## 2024-10-24 PROCEDURE — 97530 THERAPEUTIC ACTIVITIES: CPT

## 2024-10-24 PROCEDURE — 97110 THERAPEUTIC EXERCISES: CPT

## 2024-10-24 NOTE — FLOWSHEET NOTE
[facility] - Outpatient Rehabilitation and Therapy [address], [place] [address] office: [phone] fax: [phone]      Physical Therapy: TREATMENT/PROGRESS NOTE   Patient: Flaca Bose (79 y.o. female)   Examination Date: 10/24/2024   :  1945 MRN: [id number]   Visit #: 14   Insurance Allowable Auth Needed   BMN []Yes    [x]No    Insurance: Payor: MEDICARE / Plan: MEDICARE PART A AND B / Product Type: *No Product type* /   Insurance ID: [id number] - (Medicare)  Secondary Insurance (if applicable):    Treatment Diagnosis: -    ICD-10-CM    1. Difficulty walking  R26.2       2. Weakness of right lower extremity  R29.898          Medical Diagnosis:  Posterior tibial tendinitis of right lower extremity [M76.821]   Referring Physician: Sravan Conroy PA-C  PCP: Norma Villatoro MD     Plan of care signed (Y/N): Y    Date of Patient follow up with Physician:      Progress Report/POC:No  POC update due: (10 visits /OR AUTH LIMITS, whichever is less) -11/15/2024                                             Precautions/ Contra-indications:           Latex allergy:  YES  Pacemaker:    NO  Contraindications for Manipulation: NA  Date of Surgery: L TKR 2023  Other:    Red Flags:  None    C-SSRS Triggered by Intake questionnaire:   Patient answered 'NO' to both behavioral questions on intake.  No further screening warranted    Preferred Language for Healthcare:   [x] English       [] other:    SUBJECTIVE EXAMINATION     Patient stated complaint:  10/24: Saw UC specialist for her back and X-ray revealed a new l1 FRACTURE  IN ADDITION TO T8 COMPRESSION FRACTURE FOUND IN aUGUST.  MD WANTS HER TO NOT EXERCISE AND LET THE FRACTURES TRY TO HEAL ; SHE WILL FOLLOW UP WITH MD IN 2 WEEKS.  WANTED TO COME TO PT THIS DATE AND PATIENT INFORMED WILL DO A BRIEF NON-CHALLENGING SESSION.      10/21: Patient reports her lower back is still painful and she is getting x-rays on 
normal

## 2024-10-28 ENCOUNTER — APPOINTMENT (OUTPATIENT)
Dept: PHYSICAL THERAPY | Age: 79
End: 2024-10-28
Payer: MEDICARE

## 2024-10-30 ENCOUNTER — APPOINTMENT (OUTPATIENT)
Dept: PHYSICAL THERAPY | Age: 79
End: 2024-10-30
Payer: MEDICARE

## 2025-07-10 ENCOUNTER — OFFICE VISIT (OUTPATIENT)
Dept: GYNECOLOGY | Age: 80
End: 2025-07-10

## 2025-07-10 VITALS
SYSTOLIC BLOOD PRESSURE: 124 MMHG | HEART RATE: 61 BPM | OXYGEN SATURATION: 100 % | BODY MASS INDEX: 34.86 KG/M2 | WEIGHT: 216 LBS | DIASTOLIC BLOOD PRESSURE: 82 MMHG

## 2025-07-10 DIAGNOSIS — Z01.419 WELL WOMAN EXAM WITH ROUTINE GYNECOLOGICAL EXAM: Primary | ICD-10-CM

## 2025-07-10 DIAGNOSIS — Z78.0 MENOPAUSE: ICD-10-CM

## 2025-07-10 DIAGNOSIS — Z12.31 ENCOUNTER FOR SCREENING MAMMOGRAM FOR MALIGNANT NEOPLASM OF BREAST: ICD-10-CM

## 2025-07-12 ASSESSMENT — ENCOUNTER SYMPTOMS
RESPIRATORY NEGATIVE: 1
GASTROINTESTINAL NEGATIVE: 1
ALLERGIC/IMMUNOLOGIC NEGATIVE: 1
EYES NEGATIVE: 1

## 2025-07-12 NOTE — PROGRESS NOTES
need any assistance with obtaining housing, meals, medication, transportation or medical equipment?: No     Assistance needed for:: Not on file   Physical Activity: Insufficiently Active (11/11/2022)    Exercise Vital Sign     Days of Exercise per Week: 2 days     Minutes of Exercise per Session: 40 min   Stress: No Stress Concern Present (10/21/2021)    [employer] - Occupational Stress Questionnaire     Feeling of Stress : Only a little   Social Connections: Moderately Isolated (10/21/2021)    Social Connection and Isolation Panel [NHANES]     Frequency of Communication with Friends and Family: Three times a week     Frequency of Social Gatherings with Friends and Family: Three times a week     Attends Christian Services: Never     Active Member of Clubs or Organizations: No     Attends Club or Organization Meetings: Never     Marital Status:    Intimate Partner Violence: Not At Risk (11/11/2024)    Received from [employer] [facility] [employer]    Interpersonal Safety     Do you feel physically or emotionally unsafe where you currently live?: No     Within the past 12 months, have you been hit, slapped, kicked or otherwise physically hurt by anyone? : No     Within the past 12 months, have you been humiliated or emotionally abused by anyone? : No   Housing Stability: No Transportation Needs (1/17/2025)    Received from  [employer],  [employer],  [employer]    Yearly Questionnaire     Do you need any assistance with obtaining housing, meals, medication, transportation or medical equipment?: No     Assistance needed for:: Not on file     Allergies   Allergen Reactions    Latex Anaphylaxis    Ondansetron      Pt unsure    Sulfa Antibiotics      \"GAVE ME A TERRIBLE YEAST INFECTION\"     Outpatient Medications Marked as Taking for the 7/10/25 encounter (Office Visit) with Brittney Marquez MD   Medication Sig Dispense Refill    losartan (COZAAR) 50 MG tablet TAKE 1 TABLET DAILY 90

## 2025-07-21 ENCOUNTER — HOSPITAL ENCOUNTER (OUTPATIENT)
Dept: PHYSICAL THERAPY | Age: 80
Setting detail: THERAPIES SERIES
Discharge: HOME | End: 2025-07-21
Payer: MEDICARE

## 2025-07-21 DIAGNOSIS — M54.50 CHRONIC MIDLINE LOW BACK PAIN WITHOUT SCIATICA: ICD-10-CM

## 2025-07-21 DIAGNOSIS — R26.2 DIFFICULTY WALKING: Primary | ICD-10-CM

## 2025-07-21 DIAGNOSIS — G89.29 CHRONIC MIDLINE LOW BACK PAIN WITHOUT SCIATICA: ICD-10-CM

## 2025-07-21 PROCEDURE — 97530 THERAPEUTIC ACTIVITIES: CPT | Performed by: SPECIALIST

## 2025-07-21 PROCEDURE — 97161 PT EVAL LOW COMPLEX 20 MIN: CPT | Performed by: SPECIALIST

## 2025-07-21 NOTE — PLAN OF CARE
[facility] - Outpatient Rehabilitation and Therapy: [address], [place] [address] office: [phone] fax: [phone]     Physical Therapy Initial Evaluation Certification      Dear Joe Zapata MD ,    We had the pleasure of evaluating the following patient for physical therapy services at [facility] Out[facility] Therapy.  A summary of our findings can be found in the initial assessment below.  This includes our plan of care.  If you have any questions or concerns regarding these findings, please do not hesitate to contact me at the office phone number listed above.  Thank you for the referral.     Physician Signature:_______________________________Date:__________________  By signing above (or electronic signature), therapist’s plan is approved by physician       Physical Therapy: TREATMENT/PROGRESS NOTE   Patient: Flaca Bose (80 y.o. female)   Examination Date: 2025   :  1945 MRN: [id number]   Visit #: 1   Insurance Allowable Auth Needed   [place] []Yes    [x]No    Insurance: Payor: MEDICARE / Plan: MEDICARE PART A AND B / Product Type: *No Product type* /   Insurance ID: [id number] - (Medicare)  Secondary Insurance (if applicable):    Treatment Diagnosis:     ICD-10-CM    1. Difficulty walking  R26.2       2. 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

## 2025-07-23 ENCOUNTER — HOSPITAL ENCOUNTER (OUTPATIENT)
Dept: PHYSICAL THERAPY | Age: 80
Setting detail: THERAPIES SERIES
Discharge: HOME | End: 2025-07-23
Payer: MEDICARE

## 2025-07-23 PROCEDURE — 97110 THERAPEUTIC EXERCISES: CPT

## 2025-07-23 PROCEDURE — 97112 NEUROMUSCULAR REEDUCATION: CPT

## 2025-07-23 PROCEDURE — 97140 MANUAL THERAPY 1/> REGIONS: CPT

## 2025-07-23 NOTE — FLOWSHEET NOTE
[facility] - Outpatient Rehabilitation and Therapy: [facility], [place] [address] office: [phone] fax: [phone]      Physical Therapy: TREATMENT/PROGRESS NOTE   Patient: Flaca Bose (80 y.o. female)   Examination Date: 2025   :  1945 MRN: [id number]   Visit #:   Insurance Allowable Auth Needed   MN []Yes    [x]No    Insurance: Payor: MEDICARE / Plan: MEDICARE PART A AND B / Product Type: *No Product type* /   Insurance ID: [id number] - (Medicare)  Secondary Insurance (if applicable):    Treatment Diagnosis:     ICD-10-CM    1. Difficulty walking  R26.2       2. 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    Preferred Language for Healthcare:   [x] English       [] other:    SUBJECTIVE EXAMINATION     Patient stated complaint: Patient states she is feeling the same. States there is nothing new and nothing different going on. She is having pain during ambulation and states she is walking \"like a duck.\" Household management and

## 2025-07-25 ENCOUNTER — APPOINTMENT (OUTPATIENT)
Dept: PHYSICAL THERAPY | Age: 80
End: 2025-07-25
Payer: MEDICARE

## 2025-07-28 ENCOUNTER — HOSPITAL ENCOUNTER (OUTPATIENT)
Dept: PHYSICAL THERAPY | Age: 80
Setting detail: THERAPIES SERIES
Discharge: HOME | End: 2025-07-28
Payer: MEDICARE

## 2025-07-28 PROCEDURE — 97112 NEUROMUSCULAR REEDUCATION: CPT

## 2025-07-28 PROCEDURE — 97140 MANUAL THERAPY 1/> REGIONS: CPT

## 2025-07-28 PROCEDURE — 97110 THERAPEUTIC EXERCISES: CPT

## 2025-07-28 NOTE — FLOWSHEET NOTE
[facility] - Outpatient Rehabilitation and Therapy: [address], [place] [address] office: [phone] fax: [phone]      Physical Therapy: TREATMENT/PROGRESS NOTE   Patient: Flaca Bose (80 y.o. female)   Examination Date: 2025   :  1945 MRN: [id number]   Visit #: 3 / 12  Insurance Allowable Auth Needed   MN []Yes    [x]No    Insurance: Payor: MEDICARE / Plan: MEDICARE PART A AND B / Product Type: *No Product type* /   Insurance ID: [id number] - (Medicare)  Secondary Insurance (if applicable):    Treatment Diagnosis:     ICD-10-CM    1. Difficulty walking  R26.2       2. 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    Preferred Language for Healthcare:   [x] English       [] other:    SUBJECTIVE EXAMINATION     Patient stated complaint: Pt reports she might have been up on her feet too long yesterday and her R ankle is swollen today. Feels very stiff in general.       EVAL: Has a history of falls, most recently at the end of

## 2025-07-30 ENCOUNTER — HOSPITAL ENCOUNTER (OUTPATIENT)
Dept: PHYSICAL THERAPY | Age: 80
Setting detail: THERAPIES SERIES
Discharge: HOME | End: 2025-07-30
Payer: MEDICARE

## 2025-07-30 PROCEDURE — 97110 THERAPEUTIC EXERCISES: CPT

## 2025-07-30 PROCEDURE — 97012 MECHANICAL TRACTION THERAPY: CPT

## 2025-07-30 NOTE — FLOWSHEET NOTE
[facility] - Outpatient Rehabilitation and Therapy: [facility], [place] [address] office: [phone] fax: [phone]      Physical Therapy: TREATMENT/PROGRESS NOTE   Patient: Flaca Bose (80 y.o. female)   Examination Date: 2025   :  1945 MRN: [id number]   Visit #:   Insurance Allowable Auth Needed   MN []Yes    [x]No    Insurance: Payor: MEDICARE / Plan: MEDICARE PART A AND B / Product Type: *No Product type* /   Insurance ID: [id number] - (Medicare)  Secondary Insurance (if applicable):    Treatment Diagnosis:     ICD-10-CM    1. Difficulty walking  R26.2       2. 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    Preferred Language for Healthcare:   [x] English       [] other:    SUBJECTIVE EXAMINATION     Patient stated complaint: Pt left cane in the car today. She states she does feel like she is more steady when walking. Still having pain across the lower back.       EVAL: Has a history of falls, most recently at the

## 2025-08-04 ENCOUNTER — HOSPITAL ENCOUNTER (OUTPATIENT)
Dept: PHYSICAL THERAPY | Age: 80
Setting detail: THERAPIES SERIES
Discharge: HOME | End: 2025-08-04
Payer: MEDICARE

## 2025-08-04 PROCEDURE — 97012 MECHANICAL TRACTION THERAPY: CPT

## 2025-08-04 PROCEDURE — 97110 THERAPEUTIC EXERCISES: CPT

## 2025-08-06 ENCOUNTER — HOSPITAL ENCOUNTER (OUTPATIENT)
Dept: PHYSICAL THERAPY | Age: 80
Setting detail: THERAPIES SERIES
Discharge: HOME | End: 2025-08-06
Payer: MEDICARE

## 2025-08-06 PROCEDURE — 97530 THERAPEUTIC ACTIVITIES: CPT | Performed by: SPECIALIST

## 2025-08-06 PROCEDURE — 97110 THERAPEUTIC EXERCISES: CPT | Performed by: SPECIALIST

## 2025-08-06 PROCEDURE — 97012 MECHANICAL TRACTION THERAPY: CPT | Performed by: SPECIALIST

## 2025-08-11 ENCOUNTER — HOSPITAL ENCOUNTER (OUTPATIENT)
Dept: PHYSICAL THERAPY | Age: 80
Setting detail: THERAPIES SERIES
Discharge: HOME | End: 2025-08-11
Payer: MEDICARE

## 2025-08-11 PROCEDURE — 97012 MECHANICAL TRACTION THERAPY: CPT | Performed by: SPECIALIST

## 2025-08-11 PROCEDURE — 97110 THERAPEUTIC EXERCISES: CPT | Performed by: SPECIALIST

## 2025-08-13 ENCOUNTER — HOSPITAL ENCOUNTER (OUTPATIENT)
Dept: PHYSICAL THERAPY | Age: 80
Setting detail: THERAPIES SERIES
Discharge: HOME | End: 2025-08-13
Payer: MEDICARE

## 2025-08-13 PROCEDURE — 97530 THERAPEUTIC ACTIVITIES: CPT

## 2025-08-13 PROCEDURE — 97110 THERAPEUTIC EXERCISES: CPT

## 2025-08-13 PROCEDURE — 97012 MECHANICAL TRACTION THERAPY: CPT

## 2025-08-18 ENCOUNTER — APPOINTMENT (OUTPATIENT)
Dept: PHYSICAL THERAPY | Age: 80
End: 2025-08-18
Payer: MEDICARE

## 2025-08-20 ENCOUNTER — HOSPITAL ENCOUNTER (OUTPATIENT)
Dept: PHYSICAL THERAPY | Age: 80
Setting detail: THERAPIES SERIES
Discharge: HOME | End: 2025-08-20
Payer: MEDICARE

## 2025-08-20 PROCEDURE — 97012 MECHANICAL TRACTION THERAPY: CPT

## 2025-08-20 PROCEDURE — 97110 THERAPEUTIC EXERCISES: CPT

## 2025-08-22 ENCOUNTER — HOSPITAL ENCOUNTER (OUTPATIENT)
Dept: PHYSICAL THERAPY | Age: 80
Setting detail: THERAPIES SERIES
Discharge: HOME | End: 2025-08-22
Payer: MEDICARE

## 2025-08-22 PROCEDURE — 97110 THERAPEUTIC EXERCISES: CPT

## 2025-08-22 PROCEDURE — 97012 MECHANICAL TRACTION THERAPY: CPT

## 2025-08-27 ENCOUNTER — HOSPITAL ENCOUNTER (OUTPATIENT)
Dept: PHYSICAL THERAPY | Age: 80
Setting detail: THERAPIES SERIES
Discharge: HOME | End: 2025-08-27
Payer: MEDICARE

## 2025-08-27 PROCEDURE — 97530 THERAPEUTIC ACTIVITIES: CPT

## 2025-08-27 PROCEDURE — 97110 THERAPEUTIC EXERCISES: CPT

## 2025-08-29 ENCOUNTER — HOSPITAL ENCOUNTER (OUTPATIENT)
Dept: PHYSICAL THERAPY | Age: 80
Setting detail: THERAPIES SERIES
Discharge: HOME | End: 2025-08-29
Payer: MEDICARE

## 2025-08-29 PROCEDURE — 97110 THERAPEUTIC EXERCISES: CPT

## (undated) DEVICE — FORCEPS BX L240CM WRK CHN 2.8MM STD CAP W/ NDL MIC MESH